# Patient Record
Sex: MALE | Race: WHITE | NOT HISPANIC OR LATINO | Employment: OTHER | URBAN - METROPOLITAN AREA
[De-identification: names, ages, dates, MRNs, and addresses within clinical notes are randomized per-mention and may not be internally consistent; named-entity substitution may affect disease eponyms.]

---

## 2017-04-08 ENCOUNTER — APPOINTMENT (INPATIENT)
Dept: RADIOLOGY | Facility: HOSPITAL | Age: 67
DRG: 208 | End: 2017-04-08
Payer: MEDICARE

## 2017-04-08 ENCOUNTER — APPOINTMENT (EMERGENCY)
Dept: CT IMAGING | Facility: HOSPITAL | Age: 67
DRG: 291 | End: 2017-04-08
Payer: MEDICARE

## 2017-04-08 ENCOUNTER — HOSPITAL ENCOUNTER (INPATIENT)
Facility: HOSPITAL | Age: 67
LOS: 1 days | DRG: 291 | End: 2017-04-08
Attending: EMERGENCY MEDICINE | Admitting: HOSPITALIST
Payer: MEDICARE

## 2017-04-08 ENCOUNTER — APPOINTMENT (EMERGENCY)
Dept: RADIOLOGY | Facility: HOSPITAL | Age: 67
DRG: 291 | End: 2017-04-08
Payer: MEDICARE

## 2017-04-08 ENCOUNTER — HOSPITAL ENCOUNTER (INPATIENT)
Facility: HOSPITAL | Age: 67
LOS: 4 days | Discharge: HOME HEALTH | DRG: 208 | End: 2017-04-12
Attending: EMERGENCY MEDICINE | Admitting: INTERNAL MEDICINE
Payer: MEDICARE

## 2017-04-08 ENCOUNTER — APPOINTMENT (INPATIENT)
Dept: NON INVASIVE DIAGNOSTICS | Facility: HOSPITAL | Age: 67
DRG: 291 | End: 2017-04-08
Payer: MEDICARE

## 2017-04-08 ENCOUNTER — APPOINTMENT (INPATIENT)
Dept: RADIOLOGY | Facility: HOSPITAL | Age: 67
DRG: 291 | End: 2017-04-08
Payer: MEDICARE

## 2017-04-08 VITALS
BODY MASS INDEX: 42.98 KG/M2 | HEART RATE: 101 BPM | DIASTOLIC BLOOD PRESSURE: 96 MMHG | RESPIRATION RATE: 20 BRPM | SYSTOLIC BLOOD PRESSURE: 179 MMHG | WEIGHT: 307 LBS | HEIGHT: 71 IN | OXYGEN SATURATION: 95 % | TEMPERATURE: 95.2 F

## 2017-04-08 DIAGNOSIS — I24.9 ACUTE CORONARY SYNDROME (HCC): ICD-10-CM

## 2017-04-08 DIAGNOSIS — G45.9 TRANSIENT CEREBRAL ISCHEMIA, UNSPECIFIED TYPE: ICD-10-CM

## 2017-04-08 DIAGNOSIS — J96.22 ACUTE ON CHRONIC RESPIRATORY FAILURE WITH HYPOXIA AND HYPERCAPNIA (HCC): ICD-10-CM

## 2017-04-08 DIAGNOSIS — I50.9 CHF (CONGESTIVE HEART FAILURE) (HCC): ICD-10-CM

## 2017-04-08 DIAGNOSIS — J96.21 ACUTE ON CHRONIC RESPIRATORY FAILURE WITH HYPOXIA AND HYPERCAPNIA (HCC): ICD-10-CM

## 2017-04-08 DIAGNOSIS — I50.9 CHF (CONGESTIVE HEART FAILURE) (HCC): Primary | ICD-10-CM

## 2017-04-08 DIAGNOSIS — J44.1 COPD WITH ACUTE EXACERBATION (HCC): ICD-10-CM

## 2017-04-08 DIAGNOSIS — R53.1 RIGHT SIDED WEAKNESS: Primary | ICD-10-CM

## 2017-04-08 PROBLEM — E11.9 TYPE 2 DIABETES MELLITUS (HCC): Status: ACTIVE | Noted: 2017-04-08

## 2017-04-08 PROBLEM — I16.1 HYPERTENSIVE EMERGENCY: Status: ACTIVE | Noted: 2017-04-08

## 2017-04-08 PROBLEM — I48.0 PAROXYSMAL ATRIAL FIBRILLATION (HCC): Status: ACTIVE | Noted: 2017-04-08

## 2017-04-08 LAB
ALBUMIN SERPL BCP-MCNC: 2.8 G/DL (ref 3.5–5)
ALP SERPL-CCNC: 102 U/L (ref 46–116)
ALT SERPL W P-5'-P-CCNC: 44 U/L (ref 12–78)
ANION GAP SERPL CALCULATED.3IONS-SCNC: 7 MMOL/L (ref 4–13)
APTT PPP: 31 SECONDS (ref 24–36)
ARTERIAL PATENCY WRIST A: ABNORMAL
ARTERIAL PATENCY WRIST A: YES
AST SERPL W P-5'-P-CCNC: 25 U/L (ref 5–45)
BACTERIA UR QL AUTO: ABNORMAL /HPF
BASE EXCESS BLDA CALC-SCNC: 4 MMOL/L (ref -2–3)
BASE EXCESS BLDA CALC-SCNC: 5.2 MMOL/L
BASOPHILS # BLD AUTO: 0.03 THOUSANDS/ΜL (ref 0–0.1)
BASOPHILS NFR BLD AUTO: 0 % (ref 0–1)
BILIRUB SERPL-MCNC: 0.6 MG/DL (ref 0.2–1)
BILIRUB UR QL STRIP: NEGATIVE
BUN SERPL-MCNC: 20 MG/DL (ref 5–25)
CALCIUM SERPL-MCNC: 8.8 MG/DL (ref 8.3–10.1)
CHLORIDE SERPL-SCNC: 106 MMOL/L (ref 100–108)
CLARITY UR: CLEAR
CO2 SERPL-SCNC: 30 MMOL/L (ref 21–32)
COLOR UR: YELLOW
CREAT SERPL-MCNC: 1.45 MG/DL (ref 0.6–1.3)
EOSINOPHIL # BLD AUTO: 0.14 THOUSAND/ΜL (ref 0–0.61)
EOSINOPHIL NFR BLD AUTO: 2 % (ref 0–6)
ERYTHROCYTE [DISTWIDTH] IN BLOOD BY AUTOMATED COUNT: 15.4 % (ref 11.6–15.1)
FIO2 GAS DIL.REBREATH: 28 L
GFR SERPL CREATININE-BSD FRML MDRD: 48.4 ML/MIN/1.73SQ M
GLUCOSE SERPL-MCNC: 112 MG/DL (ref 65–140)
GLUCOSE SERPL-MCNC: 131 MG/DL (ref 65–140)
GLUCOSE SERPL-MCNC: 154 MG/DL (ref 65–140)
GLUCOSE SERPL-MCNC: 158 MG/DL (ref 65–140)
GLUCOSE SERPL-MCNC: 175 MG/DL (ref 65–140)
GLUCOSE UR STRIP-MCNC: ABNORMAL MG/DL
HCO3 BLDA-SCNC: 31.6 MMOL/L (ref 22–28)
HCO3 BLDA-SCNC: 34.4 MMOL/L (ref 22–28)
HCT VFR BLD AUTO: 46.1 % (ref 36.5–49.3)
HGB BLD-MCNC: 14.2 G/DL (ref 12–17)
HGB UR QL STRIP.AUTO: ABNORMAL
HOROWITZ INDEX BLDA+IHG-RTO: 80 MM[HG]
HYALINE CASTS #/AREA URNS LPF: ABNORMAL /LPF
INR PPP: 1.08 (ref 0.86–1.16)
KETONES UR STRIP-MCNC: NEGATIVE MG/DL
LEUKOCYTE ESTERASE UR QL STRIP: NEGATIVE
LYMPHOCYTES # BLD AUTO: 1.4 THOUSANDS/ΜL (ref 0.6–4.47)
LYMPHOCYTES NFR BLD AUTO: 17 % (ref 14–44)
MCH RBC QN AUTO: 27.6 PG (ref 26.8–34.3)
MCHC RBC AUTO-ENTMCNC: 30.8 G/DL (ref 31.4–37.4)
MCV RBC AUTO: 90 FL (ref 82–98)
MONOCYTES # BLD AUTO: 0.66 THOUSAND/ΜL (ref 0.17–1.22)
MONOCYTES NFR BLD AUTO: 8 % (ref 4–12)
NEUTROPHILS # BLD AUTO: 5.99 THOUSANDS/ΜL (ref 1.85–7.62)
NEUTS SEG NFR BLD AUTO: 73 % (ref 43–75)
NITRITE UR QL STRIP: NEGATIVE
NON-SQ EPI CELLS URNS QL MICRO: ABNORMAL /HPF
NT-PROBNP SERPL-MCNC: 1620 PG/ML
O2 CT BLDA-SCNC: 18 ML/DL (ref 16–23)
OXYHGB MFR BLDA: 92.5 % (ref 94–97)
PCO2 BLD: 37 MMOL/L (ref 21–32)
PCO2 BLD: 80.9 MM HG (ref 36–44)
PCO2 BLDA: 53.5 MM HG (ref 36–44)
PEEP RESPIRATORY: 5 CM[H2O]
PH BLD: 7.24 [PH] (ref 7.35–7.45)
PH BLDA: 7.39 [PH] (ref 7.35–7.45)
PH UR STRIP.AUTO: 7 [PH] (ref 4.5–8)
PLATELET # BLD AUTO: 229 THOUSANDS/UL (ref 149–390)
PMV BLD AUTO: 9.6 FL (ref 8.9–12.7)
PO2 BLD: 89 MM HG (ref 75–129)
PO2 BLDA: 70.4 MM HG (ref 75–129)
POTASSIUM SERPL-SCNC: 4 MMOL/L (ref 3.5–5.3)
PROT SERPL-MCNC: 7.3 G/DL (ref 6.4–8.2)
PROT UR STRIP-MCNC: ABNORMAL MG/DL
PROTHROMBIN TIME: 13.9 SECONDS (ref 12–14.3)
RBC # BLD AUTO: 5.14 MILLION/UL (ref 3.88–5.62)
RBC #/AREA URNS AUTO: ABNORMAL /HPF
SAMPLE SITE: ABNORMAL
SAO2 % BLD FROM PO2: 94 % (ref 95–98)
SODIUM SERPL-SCNC: 143 MMOL/L (ref 136–145)
SP GR UR STRIP.AUTO: 1.02 (ref 1–1.03)
SPECIMEN SOURCE: ABNORMAL
SPECIMEN SOURCE: ABNORMAL
TROPONIN I SERPL-MCNC: 0.04 NG/ML
TROPONIN I SERPL-MCNC: 0.04 NG/ML
UROBILINOGEN UR QL STRIP.AUTO: 0.2 E.U./DL
VENT AC: 16
VENT- AC: AC
VT SETTING VENT: 500 ML
WBC # BLD AUTO: 8.22 THOUSAND/UL (ref 4.31–10.16)
WBC #/AREA URNS AUTO: ABNORMAL /HPF

## 2017-04-08 PROCEDURE — 80053 COMPREHEN METABOLIC PANEL: CPT | Performed by: EMERGENCY MEDICINE

## 2017-04-08 PROCEDURE — 93306 TTE W/DOPPLER COMPLETE: CPT

## 2017-04-08 PROCEDURE — 70547 MR ANGIOGRAPHY NECK W/O DYE: CPT

## 2017-04-08 PROCEDURE — 87086 URINE CULTURE/COLONY COUNT: CPT | Performed by: EMERGENCY MEDICINE

## 2017-04-08 PROCEDURE — 83880 ASSAY OF NATRIURETIC PEPTIDE: CPT | Performed by: EMERGENCY MEDICINE

## 2017-04-08 PROCEDURE — 71010 HB CHEST X-RAY 1 VIEW FRONTAL (PORTABLE): CPT

## 2017-04-08 PROCEDURE — 82805 BLOOD GASES W/O2 SATURATION: CPT | Performed by: EMERGENCY MEDICINE

## 2017-04-08 PROCEDURE — 5A09357 ASSISTANCE WITH RESPIRATORY VENTILATION, LESS THAN 24 CONSECUTIVE HOURS, CONTINUOUS POSITIVE AIRWAY PRESSURE: ICD-10-PCS | Performed by: HOSPITALIST

## 2017-04-08 PROCEDURE — 84484 ASSAY OF TROPONIN QUANT: CPT | Performed by: EMERGENCY MEDICINE

## 2017-04-08 PROCEDURE — 85610 PROTHROMBIN TIME: CPT | Performed by: EMERGENCY MEDICINE

## 2017-04-08 PROCEDURE — 70551 MRI BRAIN STEM W/O DYE: CPT

## 2017-04-08 PROCEDURE — 36600 WITHDRAWAL OF ARTERIAL BLOOD: CPT

## 2017-04-08 PROCEDURE — 71020 HB CHEST X-RAY 2VW FRONTAL&LATL: CPT

## 2017-04-08 PROCEDURE — 96375 TX/PRO/DX INJ NEW DRUG ADDON: CPT

## 2017-04-08 PROCEDURE — 82803 BLOOD GASES ANY COMBINATION: CPT

## 2017-04-08 PROCEDURE — 94002 VENT MGMT INPAT INIT DAY: CPT

## 2017-04-08 PROCEDURE — 93005 ELECTROCARDIOGRAM TRACING: CPT | Performed by: HOSPITALIST

## 2017-04-08 PROCEDURE — 96374 THER/PROPH/DIAG INJ IV PUSH: CPT

## 2017-04-08 PROCEDURE — 70544 MR ANGIOGRAPHY HEAD W/O DYE: CPT

## 2017-04-08 PROCEDURE — 81001 URINALYSIS AUTO W/SCOPE: CPT | Performed by: EMERGENCY MEDICINE

## 2017-04-08 PROCEDURE — 85730 THROMBOPLASTIN TIME PARTIAL: CPT | Performed by: EMERGENCY MEDICINE

## 2017-04-08 PROCEDURE — 82948 REAGENT STRIP/BLOOD GLUCOSE: CPT

## 2017-04-08 PROCEDURE — 5A1935Z RESPIRATORY VENTILATION, LESS THAN 24 CONSECUTIVE HOURS: ICD-10-PCS | Performed by: INTERNAL MEDICINE

## 2017-04-08 PROCEDURE — 99285 EMERGENCY DEPT VISIT HI MDM: CPT

## 2017-04-08 PROCEDURE — 36415 COLL VENOUS BLD VENIPUNCTURE: CPT | Performed by: EMERGENCY MEDICINE

## 2017-04-08 PROCEDURE — 70450 CT HEAD/BRAIN W/O DYE: CPT

## 2017-04-08 PROCEDURE — 85025 COMPLETE CBC W/AUTO DIFF WBC: CPT | Performed by: EMERGENCY MEDICINE

## 2017-04-08 PROCEDURE — 94770 HB EXHALED CARBON DIOXIDE TEST: CPT

## 2017-04-08 PROCEDURE — 94760 N-INVAS EAR/PLS OXIMETRY 1: CPT

## 2017-04-08 RX ORDER — POTASSIUM CHLORIDE 20 MEQ/1
20 TABLET, EXTENDED RELEASE ORAL DAILY
Status: CANCELLED | OUTPATIENT
Start: 2017-04-09

## 2017-04-08 RX ORDER — FOLIC ACID 1 MG/1
1 TABLET ORAL DAILY
COMMUNITY

## 2017-04-08 RX ORDER — ASPIRIN 300 MG/1
300 SUPPOSITORY RECTAL DAILY
Status: CANCELLED | OUTPATIENT
Start: 2017-04-09

## 2017-04-08 RX ORDER — ASPIRIN 325 MG
325 TABLET ORAL DAILY
Status: DISCONTINUED | OUTPATIENT
Start: 2017-04-08 | End: 2017-04-08

## 2017-04-08 RX ORDER — NIACIN 500 MG/1
500 TABLET, EXTENDED RELEASE ORAL
COMMUNITY
End: 2021-10-31 | Stop reason: CLARIF

## 2017-04-08 RX ORDER — IPRATROPIUM BROMIDE AND ALBUTEROL SULFATE 2.5; .5 MG/3ML; MG/3ML
3 SOLUTION RESPIRATORY (INHALATION)
Status: CANCELLED | OUTPATIENT
Start: 2017-04-08

## 2017-04-08 RX ORDER — SIMVASTATIN 10 MG
10 TABLET ORAL
COMMUNITY

## 2017-04-08 RX ORDER — FUROSEMIDE 10 MG/ML
40 INJECTION INTRAMUSCULAR; INTRAVENOUS DAILY
Status: DISCONTINUED | OUTPATIENT
Start: 2017-04-09 | End: 2017-04-10

## 2017-04-08 RX ORDER — FOLIC ACID 1 MG/1
1 TABLET ORAL DAILY
Status: DISCONTINUED | OUTPATIENT
Start: 2017-04-08 | End: 2017-04-08 | Stop reason: HOSPADM

## 2017-04-08 RX ORDER — ONDANSETRON 2 MG/ML
4 INJECTION INTRAMUSCULAR; INTRAVENOUS EVERY 6 HOURS PRN
Status: DISCONTINUED | OUTPATIENT
Start: 2017-04-08 | End: 2017-04-12 | Stop reason: HOSPADM

## 2017-04-08 RX ORDER — LOSARTAN POTASSIUM 50 MG/1
75 TABLET ORAL DAILY
Status: DISCONTINUED | OUTPATIENT
Start: 2017-04-09 | End: 2017-04-10

## 2017-04-08 RX ORDER — TAMSULOSIN HYDROCHLORIDE 0.4 MG/1
0.8 CAPSULE ORAL
Status: DISCONTINUED | OUTPATIENT
Start: 2017-04-08 | End: 2017-04-08 | Stop reason: HOSPADM

## 2017-04-08 RX ORDER — HYDRALAZINE HYDROCHLORIDE 20 MG/ML
10 INJECTION INTRAMUSCULAR; INTRAVENOUS EVERY 6 HOURS PRN
Status: DISCONTINUED | OUTPATIENT
Start: 2017-04-08 | End: 2017-04-08

## 2017-04-08 RX ORDER — AMLODIPINE BESYLATE 10 MG/1
5 TABLET ORAL DAILY
COMMUNITY

## 2017-04-08 RX ORDER — FUROSEMIDE 10 MG/ML
40 INJECTION INTRAMUSCULAR; INTRAVENOUS DAILY
Status: DISCONTINUED | OUTPATIENT
Start: 2017-04-09 | End: 2017-04-08 | Stop reason: HOSPADM

## 2017-04-08 RX ORDER — NIACIN 500 MG/1
500 TABLET, EXTENDED RELEASE ORAL
Status: CANCELLED | OUTPATIENT
Start: 2017-04-08

## 2017-04-08 RX ORDER — ALLOPURINOL 100 MG/1
100 TABLET ORAL DAILY
Status: DISCONTINUED | OUTPATIENT
Start: 2017-04-08 | End: 2017-04-08 | Stop reason: HOSPADM

## 2017-04-08 RX ORDER — AMLODIPINE BESYLATE 10 MG/1
10 TABLET ORAL DAILY
Status: DISCONTINUED | OUTPATIENT
Start: 2017-04-09 | End: 2017-04-12 | Stop reason: HOSPADM

## 2017-04-08 RX ORDER — ATORVASTATIN CALCIUM 40 MG/1
80 TABLET, FILM COATED ORAL
Status: CANCELLED | OUTPATIENT
Start: 2017-04-09

## 2017-04-08 RX ORDER — FUROSEMIDE 10 MG/ML
40 INJECTION INTRAMUSCULAR; INTRAVENOUS DAILY
Status: CANCELLED | OUTPATIENT
Start: 2017-04-09

## 2017-04-08 RX ORDER — POTASSIUM CHLORIDE 20 MEQ/1
20 TABLET, EXTENDED RELEASE ORAL DAILY
COMMUNITY

## 2017-04-08 RX ORDER — DABIGATRAN ETEXILATE 75 MG/1
150 CAPSULE, COATED PELLETS ORAL 2 TIMES DAILY
Status: CANCELLED | OUTPATIENT
Start: 2017-04-09

## 2017-04-08 RX ORDER — ACETAMINOPHEN 325 MG/1
650 TABLET ORAL EVERY 6 HOURS PRN
Status: DISCONTINUED | OUTPATIENT
Start: 2017-04-08 | End: 2017-04-12 | Stop reason: HOSPADM

## 2017-04-08 RX ORDER — HYDROCHLOROTHIAZIDE 50 MG/1
50 TABLET ORAL DAILY
Status: DISCONTINUED | OUTPATIENT
Start: 2017-04-09 | End: 2017-04-10

## 2017-04-08 RX ORDER — ALLOPURINOL 100 MG/1
100 TABLET ORAL DAILY
COMMUNITY

## 2017-04-08 RX ORDER — TAMSULOSIN HYDROCHLORIDE 0.4 MG/1
0.8 CAPSULE ORAL
Status: CANCELLED | OUTPATIENT
Start: 2017-04-09

## 2017-04-08 RX ORDER — LOSARTAN POTASSIUM 50 MG/1
100 TABLET ORAL DAILY
COMMUNITY

## 2017-04-08 RX ORDER — NIACIN 500 MG/1
500 TABLET, EXTENDED RELEASE ORAL
Status: DISCONTINUED | OUTPATIENT
Start: 2017-04-08 | End: 2017-04-08 | Stop reason: HOSPADM

## 2017-04-08 RX ORDER — ATORVASTATIN CALCIUM 40 MG/1
80 TABLET, FILM COATED ORAL
Status: DISCONTINUED | OUTPATIENT
Start: 2017-04-08 | End: 2017-04-08

## 2017-04-08 RX ORDER — ONDANSETRON 2 MG/ML
4 INJECTION INTRAMUSCULAR; INTRAVENOUS ONCE
Status: COMPLETED | OUTPATIENT
Start: 2017-04-08 | End: 2017-04-08

## 2017-04-08 RX ORDER — POTASSIUM CHLORIDE 20 MEQ/1
20 TABLET, EXTENDED RELEASE ORAL DAILY
Status: DISCONTINUED | OUTPATIENT
Start: 2017-04-08 | End: 2017-04-08 | Stop reason: HOSPADM

## 2017-04-08 RX ORDER — DABIGATRAN ETEXILATE 150 MG/1
150 CAPSULE, COATED PELLETS ORAL 2 TIMES DAILY
Status: DISCONTINUED | OUTPATIENT
Start: 2017-04-09 | End: 2017-04-12 | Stop reason: HOSPADM

## 2017-04-08 RX ORDER — ASPIRIN 300 MG/1
300 SUPPOSITORY RECTAL DAILY
Status: DISCONTINUED | OUTPATIENT
Start: 2017-04-09 | End: 2017-04-08 | Stop reason: HOSPADM

## 2017-04-08 RX ORDER — ALLOPURINOL 100 MG/1
100 TABLET ORAL DAILY
Status: CANCELLED | OUTPATIENT
Start: 2017-04-09

## 2017-04-08 RX ORDER — PANTOPRAZOLE SODIUM 40 MG/1
40 INJECTION, POWDER, FOR SOLUTION INTRAVENOUS
Status: DISCONTINUED | OUTPATIENT
Start: 2017-04-09 | End: 2017-04-12 | Stop reason: HOSPADM

## 2017-04-08 RX ORDER — POTASSIUM CHLORIDE 20 MEQ/1
20 TABLET, EXTENDED RELEASE ORAL DAILY
Status: DISCONTINUED | OUTPATIENT
Start: 2017-04-09 | End: 2017-04-12 | Stop reason: HOSPADM

## 2017-04-08 RX ORDER — ATORVASTATIN CALCIUM 80 MG/1
80 TABLET, FILM COATED ORAL
Status: DISCONTINUED | OUTPATIENT
Start: 2017-04-09 | End: 2017-04-12 | Stop reason: HOSPADM

## 2017-04-08 RX ORDER — METHYLPREDNISOLONE SODIUM SUCCINATE 40 MG/ML
40 INJECTION, POWDER, LYOPHILIZED, FOR SOLUTION INTRAMUSCULAR; INTRAVENOUS EVERY 12 HOURS SCHEDULED
Status: CANCELLED | OUTPATIENT
Start: 2017-04-08

## 2017-04-08 RX ORDER — ONDANSETRON 2 MG/ML
4 INJECTION INTRAMUSCULAR; INTRAVENOUS EVERY 6 HOURS PRN
Status: CANCELLED | OUTPATIENT
Start: 2017-04-08

## 2017-04-08 RX ORDER — ASPIRIN 300 MG/1
300 SUPPOSITORY RECTAL DAILY
Status: DISCONTINUED | OUTPATIENT
Start: 2017-04-09 | End: 2017-04-09

## 2017-04-08 RX ORDER — IPRATROPIUM BROMIDE AND ALBUTEROL SULFATE 2.5; .5 MG/3ML; MG/3ML
3 SOLUTION RESPIRATORY (INHALATION)
Status: DISCONTINUED | OUTPATIENT
Start: 2017-04-08 | End: 2017-04-08 | Stop reason: HOSPADM

## 2017-04-08 RX ORDER — HYDRALAZINE HYDROCHLORIDE 20 MG/ML
5 INJECTION INTRAMUSCULAR; INTRAVENOUS EVERY 6 HOURS PRN
Status: DISCONTINUED | OUTPATIENT
Start: 2017-04-08 | End: 2017-04-08

## 2017-04-08 RX ORDER — PROPOFOL 10 MG/ML
5-50 INJECTION, EMULSION INTRAVENOUS
Status: DISCONTINUED | OUTPATIENT
Start: 2017-04-08 | End: 2017-04-08 | Stop reason: HOSPADM

## 2017-04-08 RX ORDER — IPRATROPIUM BROMIDE AND ALBUTEROL SULFATE 2.5; .5 MG/3ML; MG/3ML
3 SOLUTION RESPIRATORY (INHALATION)
Status: DISCONTINUED | OUTPATIENT
Start: 2017-04-08 | End: 2017-04-08

## 2017-04-08 RX ORDER — DABIGATRAN ETEXILATE 150 MG/1
150 CAPSULE, COATED PELLETS ORAL 2 TIMES DAILY
Status: ON HOLD | COMMUNITY
End: 2017-04-12

## 2017-04-08 RX ORDER — ONDANSETRON 2 MG/ML
4 INJECTION INTRAMUSCULAR; INTRAVENOUS EVERY 6 HOURS PRN
Status: DISCONTINUED | OUTPATIENT
Start: 2017-04-08 | End: 2017-04-08 | Stop reason: HOSPADM

## 2017-04-08 RX ORDER — BUDESONIDE AND FORMOTEROL FUMARATE DIHYDRATE 160; 4.5 UG/1; UG/1
2 AEROSOL RESPIRATORY (INHALATION) 2 TIMES DAILY
Status: DISCONTINUED | OUTPATIENT
Start: 2017-04-09 | End: 2017-04-12 | Stop reason: HOSPADM

## 2017-04-08 RX ORDER — TAMSULOSIN HYDROCHLORIDE 0.4 MG/1
0.8 CAPSULE ORAL
Status: DISCONTINUED | OUTPATIENT
Start: 2017-04-09 | End: 2017-04-12 | Stop reason: HOSPADM

## 2017-04-08 RX ORDER — GLIPIZIDE 10 MG/1
15 TABLET ORAL
COMMUNITY
End: 2022-06-06 | Stop reason: ALTCHOICE

## 2017-04-08 RX ORDER — FOLIC ACID 1 MG/1
1 TABLET ORAL DAILY
Status: DISCONTINUED | OUTPATIENT
Start: 2017-04-09 | End: 2017-04-12 | Stop reason: HOSPADM

## 2017-04-08 RX ORDER — HYDROCHLOROTHIAZIDE 50 MG/1
50 TABLET ORAL DAILY
COMMUNITY
End: 2017-04-12 | Stop reason: HOSPADM

## 2017-04-08 RX ORDER — METHYLPREDNISOLONE SODIUM SUCCINATE 40 MG/ML
40 INJECTION, POWDER, LYOPHILIZED, FOR SOLUTION INTRAMUSCULAR; INTRAVENOUS EVERY 12 HOURS SCHEDULED
Status: DISCONTINUED | OUTPATIENT
Start: 2017-04-08 | End: 2017-04-08 | Stop reason: HOSPADM

## 2017-04-08 RX ORDER — PROPOFOL 10 MG/ML
5-50 INJECTION, EMULSION INTRAVENOUS
Status: DISCONTINUED | OUTPATIENT
Start: 2017-04-08 | End: 2017-04-10

## 2017-04-08 RX ORDER — BUDESONIDE AND FORMOTEROL FUMARATE DIHYDRATE 160; 4.5 UG/1; UG/1
2 AEROSOL RESPIRATORY (INHALATION) 2 TIMES DAILY
COMMUNITY
End: 2022-06-06 | Stop reason: ALTCHOICE

## 2017-04-08 RX ORDER — FUROSEMIDE 10 MG/ML
40 INJECTION INTRAMUSCULAR; INTRAVENOUS ONCE
Status: COMPLETED | OUTPATIENT
Start: 2017-04-08 | End: 2017-04-08

## 2017-04-08 RX ORDER — PROPOFOL 10 MG/ML
INJECTION, EMULSION INTRAVENOUS
Status: COMPLETED
Start: 2017-04-08 | End: 2017-04-08

## 2017-04-08 RX ORDER — BUDESONIDE AND FORMOTEROL FUMARATE DIHYDRATE 160; 4.5 UG/1; UG/1
2 AEROSOL RESPIRATORY (INHALATION) 2 TIMES DAILY
Status: DISCONTINUED | OUTPATIENT
Start: 2017-04-08 | End: 2017-04-08 | Stop reason: HOSPADM

## 2017-04-08 RX ORDER — BUDESONIDE AND FORMOTEROL FUMARATE DIHYDRATE 160; 4.5 UG/1; UG/1
2 AEROSOL RESPIRATORY (INHALATION) 2 TIMES DAILY
Status: CANCELLED | OUTPATIENT
Start: 2017-04-09

## 2017-04-08 RX ORDER — FUROSEMIDE 10 MG/ML
40 INJECTION INTRAMUSCULAR; INTRAVENOUS DAILY
Status: DISCONTINUED | OUTPATIENT
Start: 2017-04-08 | End: 2017-04-08

## 2017-04-08 RX ORDER — TAMSULOSIN HYDROCHLORIDE 0.4 MG/1
0.8 CAPSULE ORAL
COMMUNITY

## 2017-04-08 RX ORDER — DABIGATRAN ETEXILATE 75 MG/1
150 CAPSULE, COATED PELLETS ORAL 2 TIMES DAILY
Status: DISCONTINUED | OUTPATIENT
Start: 2017-04-08 | End: 2017-04-08 | Stop reason: HOSPADM

## 2017-04-08 RX ORDER — ATORVASTATIN CALCIUM 40 MG/1
80 TABLET, FILM COATED ORAL
Status: DISCONTINUED | OUTPATIENT
Start: 2017-04-08 | End: 2017-04-08 | Stop reason: HOSPADM

## 2017-04-08 RX ORDER — NIACIN 500 MG/1
500 TABLET, EXTENDED RELEASE ORAL
Status: DISCONTINUED | OUTPATIENT
Start: 2017-04-08 | End: 2017-04-12 | Stop reason: HOSPADM

## 2017-04-08 RX ORDER — LEVALBUTEROL 1.25 MG/.5ML
1.25 SOLUTION, CONCENTRATE RESPIRATORY (INHALATION)
Status: DISCONTINUED | OUTPATIENT
Start: 2017-04-09 | End: 2017-04-10

## 2017-04-08 RX ORDER — ALLOPURINOL 100 MG/1
100 TABLET ORAL DAILY
Status: DISCONTINUED | OUTPATIENT
Start: 2017-04-09 | End: 2017-04-12 | Stop reason: HOSPADM

## 2017-04-08 RX ORDER — LABETALOL HYDROCHLORIDE 5 MG/ML
20 INJECTION, SOLUTION INTRAVENOUS EVERY 4 HOURS PRN
Status: DISCONTINUED | OUTPATIENT
Start: 2017-04-08 | End: 2017-04-12 | Stop reason: HOSPADM

## 2017-04-08 RX ORDER — ONDANSETRON 2 MG/ML
INJECTION INTRAMUSCULAR; INTRAVENOUS
Status: COMPLETED
Start: 2017-04-08 | End: 2017-04-08

## 2017-04-08 RX ORDER — METHYLPREDNISOLONE SODIUM SUCCINATE 40 MG/ML
40 INJECTION, POWDER, LYOPHILIZED, FOR SOLUTION INTRAMUSCULAR; INTRAVENOUS EVERY 12 HOURS SCHEDULED
Status: DISCONTINUED | OUTPATIENT
Start: 2017-04-08 | End: 2017-04-12 | Stop reason: HOSPADM

## 2017-04-08 RX ORDER — DIPHENOXYLATE HYDROCHLORIDE AND ATROPINE SULFATE 2.5; .025 MG/1; MG/1
1 TABLET ORAL DAILY
COMMUNITY

## 2017-04-08 RX ORDER — FOLIC ACID 1 MG/1
1 TABLET ORAL DAILY
Status: CANCELLED | OUTPATIENT
Start: 2017-04-09

## 2017-04-08 RX ORDER — FENTANYL CITRATE 50 UG/ML
25 INJECTION, SOLUTION INTRAMUSCULAR; INTRAVENOUS EVERY 2 HOUR PRN
Status: DISCONTINUED | OUTPATIENT
Start: 2017-04-08 | End: 2017-04-10

## 2017-04-08 RX ADMIN — ALLOPURINOL 100 MG: 100 TABLET ORAL at 16:39

## 2017-04-08 RX ADMIN — TAMSULOSIN HYDROCHLORIDE 0.8 MG: 0.4 CAPSULE ORAL at 16:40

## 2017-04-08 RX ADMIN — CEFTRIAXONE SODIUM 1000 MG: 1 INJECTION, POWDER, FOR SOLUTION INTRAMUSCULAR; INTRAVENOUS at 16:39

## 2017-04-08 RX ADMIN — DABIGATRAN ETEXILATE MESYLATE 150 MG: 75 CAPSULE ORAL at 18:57

## 2017-04-08 RX ADMIN — LABETALOL HYDROCHLORIDE 1 MG/MIN: 5 INJECTION, SOLUTION INTRAVENOUS at 17:31

## 2017-04-08 RX ADMIN — ASPIRIN 325 MG ORAL TABLET 325 MG: 325 PILL ORAL at 16:40

## 2017-04-08 RX ADMIN — ONDANSETRON 4 MG: 2 INJECTION INTRAMUSCULAR; INTRAVENOUS at 14:13

## 2017-04-08 RX ADMIN — ONDANSETRON 4 MG: 2 INJECTION INTRAMUSCULAR; INTRAVENOUS at 09:49

## 2017-04-08 RX ADMIN — METOPROLOL TARTRATE 5 MG: 5 INJECTION INTRAVENOUS at 10:41

## 2017-04-08 RX ADMIN — ATORVASTATIN CALCIUM 80 MG: 40 TABLET, FILM COATED ORAL at 16:40

## 2017-04-08 RX ADMIN — PROPOFOL 20.14 MCG/KG/MIN: 10 INJECTION, EMULSION INTRAVENOUS at 22:42

## 2017-04-08 RX ADMIN — PROPOFOL 34.6 MCG/KG/MIN: 10 INJECTION, EMULSION INTRAVENOUS at 18:58

## 2017-04-08 RX ADMIN — METHYLPREDNISOLONE SODIUM SUCCINATE 40 MG: 40 INJECTION, POWDER, FOR SOLUTION INTRAMUSCULAR; INTRAVENOUS at 22:44

## 2017-04-08 RX ADMIN — FUROSEMIDE 40 MG: 10 INJECTION, SOLUTION INTRAMUSCULAR; INTRAVENOUS at 10:41

## 2017-04-08 RX ADMIN — FOLIC ACID 1 MG: 1 TABLET ORAL at 16:39

## 2017-04-09 ENCOUNTER — APPOINTMENT (INPATIENT)
Dept: RADIOLOGY | Facility: HOSPITAL | Age: 67
DRG: 208 | End: 2017-04-09
Payer: MEDICARE

## 2017-04-09 PROBLEM — I21.4 NSTEMI (NON-ST ELEVATED MYOCARDIAL INFARCTION) (HCC): Status: ACTIVE | Noted: 2017-04-09

## 2017-04-09 LAB
ANION GAP SERPL CALCULATED.3IONS-SCNC: 11 MMOL/L (ref 4–13)
ANISOCYTOSIS BLD QL SMEAR: PRESENT
ARTERIAL PATENCY WRIST A: YES
BACTERIA UR CULT: NORMAL
BASE EXCESS BLDA CALC-SCNC: 5.5 MMOL/L
BASOPHILS # BLD MANUAL: 0 THOUSAND/UL (ref 0–0.1)
BASOPHILS NFR MAR MANUAL: 0 % (ref 0–1)
BUN SERPL-MCNC: 27 MG/DL (ref 5–25)
CALCIUM SERPL-MCNC: 8.5 MG/DL (ref 8.3–10.1)
CHLORIDE SERPL-SCNC: 104 MMOL/L (ref 100–108)
CO2 SERPL-SCNC: 24 MMOL/L (ref 21–32)
CREAT SERPL-MCNC: 1.75 MG/DL (ref 0.6–1.3)
EOSINOPHIL # BLD MANUAL: 0 THOUSAND/UL (ref 0–0.4)
EOSINOPHIL NFR BLD MANUAL: 0 % (ref 0–6)
ERYTHROCYTE [DISTWIDTH] IN BLOOD BY AUTOMATED COUNT: 15.1 % (ref 11.6–15.1)
GFR SERPL CREATININE-BSD FRML MDRD: 39 ML/MIN/1.73SQ M
GLUCOSE SERPL-MCNC: 159 MG/DL (ref 65–140)
GLUCOSE SERPL-MCNC: 162 MG/DL (ref 65–140)
GLUCOSE SERPL-MCNC: 163 MG/DL (ref 65–140)
GLUCOSE SERPL-MCNC: 169 MG/DL (ref 65–140)
GLUCOSE SERPL-MCNC: 176 MG/DL (ref 65–140)
HCO3 BLDA-SCNC: 31.3 MMOL/L (ref 22–28)
HCT VFR BLD AUTO: 42.9 % (ref 36.5–49.3)
HGB BLD-MCNC: 13.2 G/DL (ref 12–17)
HOROWITZ INDEX BLDA+IHG-RTO: 80 MM[HG]
LYMPHOCYTES # BLD AUTO: 0.46 THOUSAND/UL (ref 0.6–4.47)
LYMPHOCYTES # BLD AUTO: 4 % (ref 14–44)
MAGNESIUM SERPL-MCNC: 2.1 MG/DL (ref 1.6–2.6)
MCH RBC QN AUTO: 27.8 PG (ref 26.8–34.3)
MCHC RBC AUTO-ENTMCNC: 30.8 G/DL (ref 31.4–37.4)
MCV RBC AUTO: 90 FL (ref 82–98)
MONOCYTES # BLD AUTO: 0 THOUSAND/UL (ref 0–1.22)
MONOCYTES NFR BLD: 0 % (ref 4–12)
NEUTROPHILS # BLD MANUAL: 10.99 THOUSAND/UL (ref 1.85–7.62)
NEUTS SEG NFR BLD AUTO: 96 % (ref 43–75)
NRBC BLD AUTO-RTO: 0 /100 WBCS
O2 CT BLDA-SCNC: 17.7 ML/DL (ref 16–23)
OXYHGB MFR BLDA: 90.5 % (ref 94–97)
PCO2 BLDA: 50.2 MM HG (ref 36–44)
PEEP RESPIRATORY: 5 CM[H2O]
PH BLDA: 7.41 [PH] (ref 7.35–7.45)
PHOSPHATE SERPL-MCNC: 3.7 MG/DL (ref 2.3–4.1)
PLATELET # BLD AUTO: 246 THOUSANDS/UL (ref 149–390)
PLATELET BLD QL SMEAR: ADEQUATE
PMV BLD AUTO: 9.8 FL (ref 8.9–12.7)
PO2 BLDA: 62.1 MM HG (ref 75–129)
POIKILOCYTOSIS BLD QL SMEAR: PRESENT
POLYCHROMASIA BLD QL SMEAR: PRESENT
POTASSIUM SERPL-SCNC: 4.4 MMOL/L (ref 3.5–5.3)
RBC # BLD AUTO: 4.75 MILLION/UL (ref 3.88–5.62)
RBC MORPH BLD: PRESENT
SODIUM SERPL-SCNC: 139 MMOL/L (ref 136–145)
SPECIMEN SOURCE: ABNORMAL
TROPONIN I SERPL-MCNC: 0.03 NG/ML
VENT AC: 16
VENT- AC: AC
VT SETTING VENT: 500 ML
WBC # BLD AUTO: 11.45 THOUSAND/UL (ref 4.31–10.16)

## 2017-04-09 PROCEDURE — 85027 COMPLETE CBC AUTOMATED: CPT | Performed by: EMERGENCY MEDICINE

## 2017-04-09 PROCEDURE — 84100 ASSAY OF PHOSPHORUS: CPT | Performed by: EMERGENCY MEDICINE

## 2017-04-09 PROCEDURE — 82805 BLOOD GASES W/O2 SATURATION: CPT | Performed by: EMERGENCY MEDICINE

## 2017-04-09 PROCEDURE — 94660 CPAP INITIATION&MGMT: CPT

## 2017-04-09 PROCEDURE — 84484 ASSAY OF TROPONIN QUANT: CPT | Performed by: EMERGENCY MEDICINE

## 2017-04-09 PROCEDURE — 94640 AIRWAY INHALATION TREATMENT: CPT

## 2017-04-09 PROCEDURE — 94003 VENT MGMT INPAT SUBQ DAY: CPT

## 2017-04-09 PROCEDURE — C9113 INJ PANTOPRAZOLE SODIUM, VIA: HCPCS | Performed by: EMERGENCY MEDICINE

## 2017-04-09 PROCEDURE — 83735 ASSAY OF MAGNESIUM: CPT | Performed by: EMERGENCY MEDICINE

## 2017-04-09 PROCEDURE — 93005 ELECTROCARDIOGRAM TRACING: CPT | Performed by: EMERGENCY MEDICINE

## 2017-04-09 PROCEDURE — 80048 BASIC METABOLIC PNL TOTAL CA: CPT | Performed by: EMERGENCY MEDICINE

## 2017-04-09 PROCEDURE — 94760 N-INVAS EAR/PLS OXIMETRY 1: CPT

## 2017-04-09 PROCEDURE — 71010 HB CHEST X-RAY 1 VIEW FRONTAL (PORTABLE): CPT

## 2017-04-09 PROCEDURE — 36600 WITHDRAWAL OF ARTERIAL BLOOD: CPT

## 2017-04-09 PROCEDURE — 82948 REAGENT STRIP/BLOOD GLUCOSE: CPT

## 2017-04-09 PROCEDURE — 85007 BL SMEAR W/DIFF WBC COUNT: CPT | Performed by: EMERGENCY MEDICINE

## 2017-04-09 RX ORDER — ASPIRIN 81 MG/1
81 TABLET, CHEWABLE ORAL DAILY
Status: DISCONTINUED | OUTPATIENT
Start: 2017-04-09 | End: 2017-04-12 | Stop reason: HOSPADM

## 2017-04-09 RX ADMIN — AMLODIPINE BESYLATE 10 MG: 10 TABLET ORAL at 09:00

## 2017-04-09 RX ADMIN — HYDROCHLOROTHIAZIDE 50 MG: 50 TABLET ORAL at 09:01

## 2017-04-09 RX ADMIN — TAMSULOSIN HYDROCHLORIDE 0.8 MG: 0.4 CAPSULE ORAL at 18:30

## 2017-04-09 RX ADMIN — LEVALBUTEROL HYDROCHLORIDE 1.25 MG: 1.25 SOLUTION, CONCENTRATE RESPIRATORY (INHALATION) at 19:51

## 2017-04-09 RX ADMIN — LEVALBUTEROL HYDROCHLORIDE 1.25 MG: 1.25 SOLUTION, CONCENTRATE RESPIRATORY (INHALATION) at 07:40

## 2017-04-09 RX ADMIN — FUROSEMIDE 40 MG: 10 INJECTION, SOLUTION INTRAMUSCULAR; INTRAVENOUS at 09:00

## 2017-04-09 RX ADMIN — LEVALBUTEROL HYDROCHLORIDE 1.25 MG: 1.25 SOLUTION, CONCENTRATE RESPIRATORY (INHALATION) at 01:25

## 2017-04-09 RX ADMIN — METHYLPREDNISOLONE SODIUM SUCCINATE 40 MG: 40 INJECTION, POWDER, FOR SOLUTION INTRAMUSCULAR; INTRAVENOUS at 21:14

## 2017-04-09 RX ADMIN — DABIGATRAN ETEXILATE MESYLATE 150 MG: 150 CAPSULE ORAL at 18:29

## 2017-04-09 RX ADMIN — FOLIC ACID 1 MG: 1 TABLET ORAL at 09:00

## 2017-04-09 RX ADMIN — INSULIN LISPRO 1 UNITS: 100 INJECTION, SOLUTION INTRAVENOUS; SUBCUTANEOUS at 05:48

## 2017-04-09 RX ADMIN — METHYLPREDNISOLONE SODIUM SUCCINATE 40 MG: 40 INJECTION, POWDER, FOR SOLUTION INTRAMUSCULAR; INTRAVENOUS at 09:01

## 2017-04-09 RX ADMIN — INSULIN LISPRO 1 UNITS: 100 INJECTION, SOLUTION INTRAVENOUS; SUBCUTANEOUS at 12:21

## 2017-04-09 RX ADMIN — POTASSIUM CHLORIDE 20 MEQ: 1500 TABLET, EXTENDED RELEASE ORAL at 09:02

## 2017-04-09 RX ADMIN — INSULIN LISPRO 1 UNITS: 100 INJECTION, SOLUTION INTRAVENOUS; SUBCUTANEOUS at 18:30

## 2017-04-09 RX ADMIN — IPRATROPIUM BROMIDE 0.5 MG: 0.5 SOLUTION RESPIRATORY (INHALATION) at 07:40

## 2017-04-09 RX ADMIN — PROPOFOL 20 MCG/KG/MIN: 10 INJECTION, EMULSION INTRAVENOUS at 02:54

## 2017-04-09 RX ADMIN — IPRATROPIUM BROMIDE 0.5 MG: 0.5 SOLUTION RESPIRATORY (INHALATION) at 19:51

## 2017-04-09 RX ADMIN — ALLOPURINOL 100 MG: 100 TABLET ORAL at 08:58

## 2017-04-09 RX ADMIN — PANTOPRAZOLE SODIUM 40 MG: 40 INJECTION, POWDER, FOR SOLUTION INTRAVENOUS at 09:00

## 2017-04-09 RX ADMIN — BUDESONIDE AND FORMOTEROL FUMARATE DIHYDRATE 2 PUFF: 160; 4.5 AEROSOL RESPIRATORY (INHALATION) at 18:30

## 2017-04-09 RX ADMIN — ASPIRIN 300 MG: 300 SUPPOSITORY RECTAL at 10:19

## 2017-04-09 RX ADMIN — ATORVASTATIN CALCIUM 80 MG: 40 TABLET, FILM COATED ORAL at 18:29

## 2017-04-09 RX ADMIN — INSULIN LISPRO 1 UNITS: 100 INJECTION, SOLUTION INTRAVENOUS; SUBCUTANEOUS at 23:56

## 2017-04-09 RX ADMIN — LEVALBUTEROL HYDROCHLORIDE 1.25 MG: 1.25 SOLUTION, CONCENTRATE RESPIRATORY (INHALATION) at 13:57

## 2017-04-09 RX ADMIN — IPRATROPIUM BROMIDE 0.5 MG: 0.5 SOLUTION RESPIRATORY (INHALATION) at 13:57

## 2017-04-09 RX ADMIN — IPRATROPIUM BROMIDE 0.5 MG: 0.5 SOLUTION RESPIRATORY (INHALATION) at 01:25

## 2017-04-09 RX ADMIN — DABIGATRAN ETEXILATE MESYLATE 150 MG: 150 CAPSULE ORAL at 09:00

## 2017-04-09 RX ADMIN — LOSARTAN POTASSIUM 75 MG: 50 TABLET, FILM COATED ORAL at 09:01

## 2017-04-09 RX ADMIN — NIACIN 500 MG: 500 TABLET, FILM COATED, EXTENDED RELEASE ORAL at 21:14

## 2017-04-10 ENCOUNTER — APPOINTMENT (INPATIENT)
Dept: RADIOLOGY | Facility: HOSPITAL | Age: 67
DRG: 208 | End: 2017-04-10
Payer: MEDICARE

## 2017-04-10 LAB
ANION GAP SERPL CALCULATED.3IONS-SCNC: 5 MMOL/L (ref 4–13)
ATRIAL RATE: 100 BPM
ATRIAL RATE: 100 BPM
BASE EX.OXY STD BLDV CALC-SCNC: 68.7 % (ref 60–80)
BASE EXCESS BLDV CALC-SCNC: 5.5 MMOL/L
BASOPHILS # BLD MANUAL: 0 THOUSAND/UL (ref 0–0.1)
BASOPHILS NFR MAR MANUAL: 0 % (ref 0–1)
BUN SERPL-MCNC: 39 MG/DL (ref 5–25)
CALCIUM SERPL-MCNC: 8.7 MG/DL (ref 8.3–10.1)
CHLORIDE SERPL-SCNC: 103 MMOL/L (ref 100–108)
CO2 SERPL-SCNC: 32 MMOL/L (ref 21–32)
CREAT SERPL-MCNC: 1.97 MG/DL (ref 0.6–1.3)
EOSINOPHIL # BLD MANUAL: 0 THOUSAND/UL (ref 0–0.4)
EOSINOPHIL NFR BLD MANUAL: 0 % (ref 0–6)
ERYTHROCYTE [DISTWIDTH] IN BLOOD BY AUTOMATED COUNT: 15 % (ref 11.6–15.1)
GFR SERPL CREATININE-BSD FRML MDRD: 34 ML/MIN/1.73SQ M
GLUCOSE SERPL-MCNC: 160 MG/DL (ref 65–140)
GLUCOSE SERPL-MCNC: 182 MG/DL (ref 65–140)
GLUCOSE SERPL-MCNC: 183 MG/DL (ref 65–140)
GLUCOSE SERPL-MCNC: 242 MG/DL (ref 65–140)
GLUCOSE SERPL-MCNC: 267 MG/DL (ref 65–140)
HCO3 BLDV-SCNC: 32.5 MMOL/L (ref 24–30)
HCT VFR BLD AUTO: 42.4 % (ref 36.5–49.3)
HGB BLD-MCNC: 13.2 G/DL (ref 12–17)
IPAP: 10
LYMPHOCYTES # BLD AUTO: 0.62 THOUSAND/UL (ref 0.6–4.47)
LYMPHOCYTES # BLD AUTO: 4 % (ref 14–44)
MCH RBC QN AUTO: 27.8 PG (ref 26.8–34.3)
MCHC RBC AUTO-ENTMCNC: 31.1 G/DL (ref 31.4–37.4)
MCV RBC AUTO: 89 FL (ref 82–98)
MONOCYTES # BLD AUTO: 0.31 THOUSAND/UL (ref 0–1.22)
MONOCYTES NFR BLD: 2 % (ref 4–12)
NEUTROPHILS # BLD MANUAL: 14.49 THOUSAND/UL (ref 1.85–7.62)
NEUTS SEG NFR BLD AUTO: 94 % (ref 43–75)
NON VENT- BIPAP: ABNORMAL
NRBC BLD AUTO-RTO: 0 /100 WBCS
O2 CT BLDV-SCNC: 13.2 ML/DL
PCO2 BLDV: 58 MM HG (ref 42–50)
PEEP MAX SETTING VENT: 5 CM[H2O]
PH BLDV: 7.37 [PH] (ref 7.3–7.4)
PLATELET # BLD AUTO: 251 THOUSANDS/UL (ref 149–390)
PLATELET BLD QL SMEAR: ADEQUATE
PMV BLD AUTO: 9.7 FL (ref 8.9–12.7)
PO2 BLDV: 39.2 MM HG (ref 35–45)
POTASSIUM SERPL-SCNC: 4.1 MMOL/L (ref 3.5–5.3)
QRS AXIS: -75 DEGREES
QRS AXIS: 134 DEGREES
QRSD INTERVAL: 83 MS
QRSD INTERVAL: 86 MS
QT INTERVAL: 322 MS
QT INTERVAL: 350 MS
QTC INTERVAL: 416 MS
QTC INTERVAL: 421 MS
RBC # BLD AUTO: 4.75 MILLION/UL (ref 3.88–5.62)
RBC MORPH BLD: NORMAL
SODIUM SERPL-SCNC: 140 MMOL/L (ref 136–145)
T WAVE AXIS: 12 DEGREES
T WAVE AXIS: 73 DEGREES
VENT BIPAP FIO2: 40 %
VENTRICULAR RATE: 103 BPM
VENTRICULAR RATE: 85 BPM
WBC # BLD AUTO: 15.42 THOUSAND/UL (ref 4.31–10.16)

## 2017-04-10 PROCEDURE — 85007 BL SMEAR W/DIFF WBC COUNT: CPT | Performed by: EMERGENCY MEDICINE

## 2017-04-10 PROCEDURE — 82948 REAGENT STRIP/BLOOD GLUCOSE: CPT

## 2017-04-10 PROCEDURE — 82805 BLOOD GASES W/O2 SATURATION: CPT | Performed by: EMERGENCY MEDICINE

## 2017-04-10 PROCEDURE — C9113 INJ PANTOPRAZOLE SODIUM, VIA: HCPCS | Performed by: EMERGENCY MEDICINE

## 2017-04-10 PROCEDURE — 94640 AIRWAY INHALATION TREATMENT: CPT

## 2017-04-10 PROCEDURE — 93880 EXTRACRANIAL BILAT STUDY: CPT

## 2017-04-10 PROCEDURE — 85027 COMPLETE CBC AUTOMATED: CPT | Performed by: EMERGENCY MEDICINE

## 2017-04-10 PROCEDURE — 94660 CPAP INITIATION&MGMT: CPT

## 2017-04-10 PROCEDURE — 94760 N-INVAS EAR/PLS OXIMETRY 1: CPT

## 2017-04-10 PROCEDURE — 80048 BASIC METABOLIC PNL TOTAL CA: CPT | Performed by: EMERGENCY MEDICINE

## 2017-04-10 RX ORDER — LEVALBUTEROL 1.25 MG/.5ML
1.25 SOLUTION, CONCENTRATE RESPIRATORY (INHALATION) EVERY 6 HOURS PRN
Status: DISCONTINUED | OUTPATIENT
Start: 2017-04-10 | End: 2017-04-12 | Stop reason: HOSPADM

## 2017-04-10 RX ORDER — LEVALBUTEROL 1.25 MG/.5ML
1.25 SOLUTION, CONCENTRATE RESPIRATORY (INHALATION)
Status: DISCONTINUED | OUTPATIENT
Start: 2017-04-10 | End: 2017-04-12 | Stop reason: HOSPADM

## 2017-04-10 RX ADMIN — FOLIC ACID 1 MG: 1 TABLET ORAL at 09:32

## 2017-04-10 RX ADMIN — ALLOPURINOL 100 MG: 100 TABLET ORAL at 09:33

## 2017-04-10 RX ADMIN — ATORVASTATIN CALCIUM 80 MG: 40 TABLET, FILM COATED ORAL at 17:46

## 2017-04-10 RX ADMIN — LOSARTAN POTASSIUM 75 MG: 50 TABLET, FILM COATED ORAL at 09:32

## 2017-04-10 RX ADMIN — BUDESONIDE AND FORMOTEROL FUMARATE DIHYDRATE 2 PUFF: 160; 4.5 AEROSOL RESPIRATORY (INHALATION) at 17:45

## 2017-04-10 RX ADMIN — INSULIN LISPRO 2 UNITS: 100 INJECTION, SOLUTION INTRAVENOUS; SUBCUTANEOUS at 12:47

## 2017-04-10 RX ADMIN — FUROSEMIDE 40 MG: 10 INJECTION, SOLUTION INTRAMUSCULAR; INTRAVENOUS at 09:31

## 2017-04-10 RX ADMIN — INSULIN LISPRO 2 UNITS: 100 INJECTION, SOLUTION INTRAVENOUS; SUBCUTANEOUS at 17:46

## 2017-04-10 RX ADMIN — INSULIN LISPRO 1 UNITS: 100 INJECTION, SOLUTION INTRAVENOUS; SUBCUTANEOUS at 23:34

## 2017-04-10 RX ADMIN — LEVALBUTEROL HYDROCHLORIDE 1.25 MG: 1.25 SOLUTION, CONCENTRATE RESPIRATORY (INHALATION) at 19:35

## 2017-04-10 RX ADMIN — NIACIN 500 MG: 500 TABLET, FILM COATED, EXTENDED RELEASE ORAL at 23:34

## 2017-04-10 RX ADMIN — LEVALBUTEROL HYDROCHLORIDE 1.25 MG: 1.25 SOLUTION, CONCENTRATE RESPIRATORY (INHALATION) at 14:54

## 2017-04-10 RX ADMIN — HYDROCHLOROTHIAZIDE 50 MG: 50 TABLET ORAL at 09:33

## 2017-04-10 RX ADMIN — BUDESONIDE AND FORMOTEROL FUMARATE DIHYDRATE 2 PUFF: 160; 4.5 AEROSOL RESPIRATORY (INHALATION) at 09:33

## 2017-04-10 RX ADMIN — ASPIRIN 81 MG: 81 TABLET, CHEWABLE ORAL at 09:32

## 2017-04-10 RX ADMIN — LEVALBUTEROL HYDROCHLORIDE 1.25 MG: 1.25 SOLUTION, CONCENTRATE RESPIRATORY (INHALATION) at 09:00

## 2017-04-10 RX ADMIN — IPRATROPIUM BROMIDE 0.5 MG: 0.5 SOLUTION RESPIRATORY (INHALATION) at 19:34

## 2017-04-10 RX ADMIN — PANTOPRAZOLE SODIUM 40 MG: 40 INJECTION, POWDER, FOR SOLUTION INTRAVENOUS at 09:31

## 2017-04-10 RX ADMIN — DABIGATRAN ETEXILATE MESYLATE 150 MG: 150 CAPSULE ORAL at 17:46

## 2017-04-10 RX ADMIN — TAMSULOSIN HYDROCHLORIDE 0.8 MG: 0.4 CAPSULE ORAL at 17:46

## 2017-04-10 RX ADMIN — DABIGATRAN ETEXILATE MESYLATE 150 MG: 150 CAPSULE ORAL at 09:33

## 2017-04-10 RX ADMIN — IPRATROPIUM BROMIDE 0.5 MG: 0.5 SOLUTION RESPIRATORY (INHALATION) at 09:00

## 2017-04-10 RX ADMIN — POTASSIUM CHLORIDE 20 MEQ: 1500 TABLET, EXTENDED RELEASE ORAL at 09:32

## 2017-04-10 RX ADMIN — IPRATROPIUM BROMIDE 0.5 MG: 0.5 SOLUTION RESPIRATORY (INHALATION) at 14:54

## 2017-04-10 RX ADMIN — INSULIN LISPRO 1 UNITS: 100 INJECTION, SOLUTION INTRAVENOUS; SUBCUTANEOUS at 06:27

## 2017-04-10 RX ADMIN — AMLODIPINE BESYLATE 10 MG: 10 TABLET ORAL at 09:32

## 2017-04-10 RX ADMIN — METHYLPREDNISOLONE SODIUM SUCCINATE 40 MG: 40 INJECTION, POWDER, FOR SOLUTION INTRAMUSCULAR; INTRAVENOUS at 20:54

## 2017-04-10 RX ADMIN — LEVALBUTEROL HYDROCHLORIDE 1.25 MG: 1.25 SOLUTION, CONCENTRATE RESPIRATORY (INHALATION) at 01:15

## 2017-04-10 RX ADMIN — IPRATROPIUM BROMIDE 0.5 MG: 0.5 SOLUTION RESPIRATORY (INHALATION) at 01:15

## 2017-04-10 RX ADMIN — METHYLPREDNISOLONE SODIUM SUCCINATE 40 MG: 40 INJECTION, POWDER, FOR SOLUTION INTRAMUSCULAR; INTRAVENOUS at 09:31

## 2017-04-11 LAB
ANION GAP SERPL CALCULATED.3IONS-SCNC: 5 MMOL/L (ref 4–13)
BASOPHILS # BLD MANUAL: 0 THOUSAND/UL (ref 0–0.1)
BASOPHILS NFR MAR MANUAL: 0 % (ref 0–1)
BUN SERPL-MCNC: 45 MG/DL (ref 5–25)
BURR CELLS BLD QL SMEAR: PRESENT
CALCIUM SERPL-MCNC: 8.8 MG/DL (ref 8.3–10.1)
CHLORIDE SERPL-SCNC: 99 MMOL/L (ref 100–108)
CO2 SERPL-SCNC: 34 MMOL/L (ref 21–32)
CREAT SERPL-MCNC: 1.86 MG/DL (ref 0.6–1.3)
EOSINOPHIL # BLD MANUAL: 0 THOUSAND/UL (ref 0–0.4)
EOSINOPHIL NFR BLD MANUAL: 0 % (ref 0–6)
ERYTHROCYTE [DISTWIDTH] IN BLOOD BY AUTOMATED COUNT: 15.1 % (ref 11.6–15.1)
GFR SERPL CREATININE-BSD FRML MDRD: 36.3 ML/MIN/1.73SQ M
GLUCOSE SERPL-MCNC: 157 MG/DL (ref 65–140)
GLUCOSE SERPL-MCNC: 163 MG/DL (ref 65–140)
GLUCOSE SERPL-MCNC: 173 MG/DL (ref 65–140)
GLUCOSE SERPL-MCNC: 181 MG/DL (ref 65–140)
GLUCOSE SERPL-MCNC: 234 MG/DL (ref 65–140)
GLUCOSE SERPL-MCNC: 250 MG/DL (ref 65–140)
HCT VFR BLD AUTO: 42.9 % (ref 36.5–49.3)
HGB BLD-MCNC: 13.4 G/DL (ref 12–17)
LYMPHOCYTES # BLD AUTO: 0.48 THOUSAND/UL (ref 0.6–4.47)
LYMPHOCYTES # BLD AUTO: 3 % (ref 14–44)
MCH RBC QN AUTO: 27.6 PG (ref 26.8–34.3)
MCHC RBC AUTO-ENTMCNC: 31.2 G/DL (ref 31.4–37.4)
MCV RBC AUTO: 89 FL (ref 82–98)
MONOCYTES # BLD AUTO: 0.32 THOUSAND/UL (ref 0–1.22)
MONOCYTES NFR BLD: 2 % (ref 4–12)
NEUTROPHILS # BLD MANUAL: 15.15 THOUSAND/UL (ref 1.85–7.62)
NEUTS SEG NFR BLD AUTO: 95 % (ref 43–75)
NRBC BLD AUTO-RTO: 0 /100 WBCS
PLATELET # BLD AUTO: 252 THOUSANDS/UL (ref 149–390)
PLATELET BLD QL SMEAR: ADEQUATE
PMV BLD AUTO: 9.8 FL (ref 8.9–12.7)
POIKILOCYTOSIS BLD QL SMEAR: PRESENT
POTASSIUM SERPL-SCNC: 4.1 MMOL/L (ref 3.5–5.3)
RBC # BLD AUTO: 4.85 MILLION/UL (ref 3.88–5.62)
RBC MORPH BLD: PRESENT
SODIUM SERPL-SCNC: 138 MMOL/L (ref 136–145)
WBC # BLD AUTO: 15.95 THOUSAND/UL (ref 4.31–10.16)

## 2017-04-11 PROCEDURE — 97163 PT EVAL HIGH COMPLEX 45 MIN: CPT

## 2017-04-11 PROCEDURE — 94760 N-INVAS EAR/PLS OXIMETRY 1: CPT

## 2017-04-11 PROCEDURE — 85027 COMPLETE CBC AUTOMATED: CPT | Performed by: EMERGENCY MEDICINE

## 2017-04-11 PROCEDURE — C9113 INJ PANTOPRAZOLE SODIUM, VIA: HCPCS | Performed by: EMERGENCY MEDICINE

## 2017-04-11 PROCEDURE — 80048 BASIC METABOLIC PNL TOTAL CA: CPT | Performed by: EMERGENCY MEDICINE

## 2017-04-11 PROCEDURE — 94640 AIRWAY INHALATION TREATMENT: CPT

## 2017-04-11 PROCEDURE — 97166 OT EVAL MOD COMPLEX 45 MIN: CPT

## 2017-04-11 PROCEDURE — G8978 MOBILITY CURRENT STATUS: HCPCS

## 2017-04-11 PROCEDURE — G8979 MOBILITY GOAL STATUS: HCPCS

## 2017-04-11 PROCEDURE — G8987 SELF CARE CURRENT STATUS: HCPCS

## 2017-04-11 PROCEDURE — G8988 SELF CARE GOAL STATUS: HCPCS

## 2017-04-11 PROCEDURE — 82948 REAGENT STRIP/BLOOD GLUCOSE: CPT

## 2017-04-11 PROCEDURE — 85007 BL SMEAR W/DIFF WBC COUNT: CPT | Performed by: EMERGENCY MEDICINE

## 2017-04-11 PROCEDURE — 94660 CPAP INITIATION&MGMT: CPT

## 2017-04-11 RX ADMIN — POTASSIUM CHLORIDE 20 MEQ: 1500 TABLET, EXTENDED RELEASE ORAL at 08:55

## 2017-04-11 RX ADMIN — AMLODIPINE BESYLATE 10 MG: 10 TABLET ORAL at 08:55

## 2017-04-11 RX ADMIN — DABIGATRAN ETEXILATE MESYLATE 150 MG: 150 CAPSULE ORAL at 08:56

## 2017-04-11 RX ADMIN — FOLIC ACID 1 MG: 1 TABLET ORAL at 08:55

## 2017-04-11 RX ADMIN — TAMSULOSIN HYDROCHLORIDE 0.8 MG: 0.4 CAPSULE ORAL at 16:20

## 2017-04-11 RX ADMIN — NIACIN 500 MG: 500 TABLET, FILM COATED, EXTENDED RELEASE ORAL at 21:45

## 2017-04-11 RX ADMIN — BUDESONIDE AND FORMOTEROL FUMARATE DIHYDRATE 2 PUFF: 160; 4.5 AEROSOL RESPIRATORY (INHALATION) at 18:50

## 2017-04-11 RX ADMIN — PANTOPRAZOLE SODIUM 40 MG: 40 INJECTION, POWDER, FOR SOLUTION INTRAVENOUS at 08:55

## 2017-04-11 RX ADMIN — INSULIN LISPRO 2 UNITS: 100 INJECTION, SOLUTION INTRAVENOUS; SUBCUTANEOUS at 18:48

## 2017-04-11 RX ADMIN — ASPIRIN 81 MG: 81 TABLET, CHEWABLE ORAL at 08:55

## 2017-04-11 RX ADMIN — IPRATROPIUM BROMIDE 0.5 MG: 0.5 SOLUTION RESPIRATORY (INHALATION) at 13:03

## 2017-04-11 RX ADMIN — LEVALBUTEROL HYDROCHLORIDE 1.25 MG: 1.25 SOLUTION, CONCENTRATE RESPIRATORY (INHALATION) at 13:03

## 2017-04-11 RX ADMIN — METHYLPREDNISOLONE SODIUM SUCCINATE 40 MG: 40 INJECTION, POWDER, FOR SOLUTION INTRAMUSCULAR; INTRAVENOUS at 21:46

## 2017-04-11 RX ADMIN — INSULIN LISPRO 1 UNITS: 100 INJECTION, SOLUTION INTRAVENOUS; SUBCUTANEOUS at 06:57

## 2017-04-11 RX ADMIN — IPRATROPIUM BROMIDE 0.5 MG: 0.5 SOLUTION RESPIRATORY (INHALATION) at 19:01

## 2017-04-11 RX ADMIN — METHYLPREDNISOLONE SODIUM SUCCINATE 40 MG: 40 INJECTION, POWDER, FOR SOLUTION INTRAMUSCULAR; INTRAVENOUS at 08:55

## 2017-04-11 RX ADMIN — LEVALBUTEROL HYDROCHLORIDE 1.25 MG: 1.25 SOLUTION, CONCENTRATE RESPIRATORY (INHALATION) at 19:01

## 2017-04-11 RX ADMIN — ALLOPURINOL 100 MG: 100 TABLET ORAL at 08:56

## 2017-04-11 RX ADMIN — ATORVASTATIN CALCIUM 80 MG: 40 TABLET, FILM COATED ORAL at 16:20

## 2017-04-11 RX ADMIN — INSULIN LISPRO 2 UNITS: 100 INJECTION, SOLUTION INTRAVENOUS; SUBCUTANEOUS at 11:41

## 2017-04-11 RX ADMIN — DABIGATRAN ETEXILATE MESYLATE 150 MG: 150 CAPSULE ORAL at 18:48

## 2017-04-11 RX ADMIN — BUDESONIDE AND FORMOTEROL FUMARATE DIHYDRATE 2 PUFF: 160; 4.5 AEROSOL RESPIRATORY (INHALATION) at 08:56

## 2017-04-12 VITALS
TEMPERATURE: 98.2 F | WEIGHT: 293.87 LBS | SYSTOLIC BLOOD PRESSURE: 153 MMHG | HEART RATE: 82 BPM | RESPIRATION RATE: 18 BRPM | HEIGHT: 71 IN | DIASTOLIC BLOOD PRESSURE: 82 MMHG | BODY MASS INDEX: 41.14 KG/M2 | OXYGEN SATURATION: 91 %

## 2017-04-12 LAB
ANION GAP SERPL CALCULATED.3IONS-SCNC: 8 MMOL/L (ref 4–13)
BASOPHILS # BLD MANUAL: 0 THOUSAND/UL (ref 0–0.1)
BASOPHILS NFR MAR MANUAL: 0 % (ref 0–1)
BUN SERPL-MCNC: 43 MG/DL (ref 5–25)
CALCIUM SERPL-MCNC: 8.7 MG/DL (ref 8.3–10.1)
CHLORIDE SERPL-SCNC: 99 MMOL/L (ref 100–108)
CO2 SERPL-SCNC: 31 MMOL/L (ref 21–32)
CREAT SERPL-MCNC: 1.62 MG/DL (ref 0.6–1.3)
EOSINOPHIL # BLD MANUAL: 0 THOUSAND/UL (ref 0–0.4)
EOSINOPHIL NFR BLD MANUAL: 0 % (ref 0–6)
ERYTHROCYTE [DISTWIDTH] IN BLOOD BY AUTOMATED COUNT: 14.9 % (ref 11.6–15.1)
GFR SERPL CREATININE-BSD FRML MDRD: 42.6 ML/MIN/1.73SQ M
GLUCOSE SERPL-MCNC: 179 MG/DL (ref 65–140)
GLUCOSE SERPL-MCNC: 186 MG/DL (ref 65–140)
GLUCOSE SERPL-MCNC: 201 MG/DL (ref 65–140)
GLUCOSE SERPL-MCNC: 205 MG/DL (ref 65–140)
GLUCOSE SERPL-MCNC: 214 MG/DL (ref 65–140)
HCT VFR BLD AUTO: 42.6 % (ref 36.5–49.3)
HGB BLD-MCNC: 13.3 G/DL (ref 12–17)
LYMPHOCYTES # BLD AUTO: 0.35 THOUSAND/UL (ref 0.6–4.47)
LYMPHOCYTES # BLD AUTO: 3 % (ref 14–44)
MCH RBC QN AUTO: 27.8 PG (ref 26.8–34.3)
MCHC RBC AUTO-ENTMCNC: 31.2 G/DL (ref 31.4–37.4)
MCV RBC AUTO: 89 FL (ref 82–98)
MONOCYTES # BLD AUTO: 0 THOUSAND/UL (ref 0–1.22)
MONOCYTES NFR BLD: 0 % (ref 4–12)
NEUTROPHILS # BLD MANUAL: 11.11 THOUSAND/UL (ref 1.85–7.62)
NEUTS SEG NFR BLD AUTO: 96 % (ref 43–75)
NRBC BLD AUTO-RTO: 0 /100 WBCS
PLATELET # BLD AUTO: 260 THOUSANDS/UL (ref 149–390)
PLATELET BLD QL SMEAR: ADEQUATE
PMV BLD AUTO: 10 FL (ref 8.9–12.7)
POIKILOCYTOSIS BLD QL SMEAR: PRESENT
POTASSIUM SERPL-SCNC: 4.4 MMOL/L (ref 3.5–5.3)
RBC # BLD AUTO: 4.79 MILLION/UL (ref 3.88–5.62)
RBC MORPH BLD: PRESENT
SODIUM SERPL-SCNC: 138 MMOL/L (ref 136–145)
VARIANT LYMPHS # BLD AUTO: 1 %
WBC # BLD AUTO: 11.57 THOUSAND/UL (ref 4.31–10.16)

## 2017-04-12 PROCEDURE — C9113 INJ PANTOPRAZOLE SODIUM, VIA: HCPCS | Performed by: EMERGENCY MEDICINE

## 2017-04-12 PROCEDURE — 85027 COMPLETE CBC AUTOMATED: CPT | Performed by: EMERGENCY MEDICINE

## 2017-04-12 PROCEDURE — 80048 BASIC METABOLIC PNL TOTAL CA: CPT | Performed by: EMERGENCY MEDICINE

## 2017-04-12 PROCEDURE — 82948 REAGENT STRIP/BLOOD GLUCOSE: CPT

## 2017-04-12 PROCEDURE — 97110 THERAPEUTIC EXERCISES: CPT

## 2017-04-12 PROCEDURE — 97116 GAIT TRAINING THERAPY: CPT

## 2017-04-12 PROCEDURE — 85007 BL SMEAR W/DIFF WBC COUNT: CPT | Performed by: EMERGENCY MEDICINE

## 2017-04-12 PROCEDURE — 94760 N-INVAS EAR/PLS OXIMETRY 1: CPT

## 2017-04-12 PROCEDURE — 94640 AIRWAY INHALATION TREATMENT: CPT

## 2017-04-12 RX ORDER — FUROSEMIDE 20 MG/1
20 TABLET ORAL DAILY
Qty: 30 TABLET | Refills: 0 | Status: SHIPPED | OUTPATIENT
Start: 2017-04-12 | End: 2017-05-12

## 2017-04-12 RX ORDER — DABIGATRAN ETEXILATE 150 MG/1
150 CAPSULE, COATED PELLETS ORAL 2 TIMES DAILY
Qty: 60 CAPSULE | Refills: 0 | Status: SHIPPED | OUTPATIENT
Start: 2017-04-12 | End: 2017-05-12

## 2017-04-12 RX ADMIN — IPRATROPIUM BROMIDE 0.5 MG: 0.5 SOLUTION RESPIRATORY (INHALATION) at 13:21

## 2017-04-12 RX ADMIN — ATORVASTATIN CALCIUM 80 MG: 40 TABLET, FILM COATED ORAL at 16:01

## 2017-04-12 RX ADMIN — ALLOPURINOL 100 MG: 100 TABLET ORAL at 08:40

## 2017-04-12 RX ADMIN — INSULIN LISPRO 1 UNITS: 100 INJECTION, SOLUTION INTRAVENOUS; SUBCUTANEOUS at 08:41

## 2017-04-12 RX ADMIN — INSULIN LISPRO 1 UNITS: 100 INJECTION, SOLUTION INTRAVENOUS; SUBCUTANEOUS at 01:24

## 2017-04-12 RX ADMIN — INSULIN LISPRO 1 UNITS: 100 INJECTION, SOLUTION INTRAVENOUS; SUBCUTANEOUS at 12:14

## 2017-04-12 RX ADMIN — AMLODIPINE BESYLATE 10 MG: 10 TABLET ORAL at 08:40

## 2017-04-12 RX ADMIN — LEVALBUTEROL HYDROCHLORIDE 1.25 MG: 1.25 SOLUTION, CONCENTRATE RESPIRATORY (INHALATION) at 13:20

## 2017-04-12 RX ADMIN — PANTOPRAZOLE SODIUM 40 MG: 40 INJECTION, POWDER, FOR SOLUTION INTRAVENOUS at 08:40

## 2017-04-12 RX ADMIN — METHYLPREDNISOLONE SODIUM SUCCINATE 40 MG: 40 INJECTION, POWDER, FOR SOLUTION INTRAMUSCULAR; INTRAVENOUS at 08:40

## 2017-04-12 RX ADMIN — BUDESONIDE AND FORMOTEROL FUMARATE DIHYDRATE 2 PUFF: 160; 4.5 AEROSOL RESPIRATORY (INHALATION) at 08:39

## 2017-04-12 RX ADMIN — ASPIRIN 81 MG: 81 TABLET, CHEWABLE ORAL at 08:40

## 2017-04-12 RX ADMIN — DABIGATRAN ETEXILATE MESYLATE 150 MG: 150 CAPSULE ORAL at 08:40

## 2017-04-12 RX ADMIN — POTASSIUM CHLORIDE 20 MEQ: 1500 TABLET, EXTENDED RELEASE ORAL at 08:40

## 2017-04-12 RX ADMIN — BUDESONIDE AND FORMOTEROL FUMARATE DIHYDRATE 2 PUFF: 160; 4.5 AEROSOL RESPIRATORY (INHALATION) at 16:02

## 2017-04-12 RX ADMIN — INSULIN LISPRO 1 UNITS: 100 INJECTION, SOLUTION INTRAVENOUS; SUBCUTANEOUS at 16:04

## 2017-04-12 RX ADMIN — FOLIC ACID 1 MG: 1 TABLET ORAL at 08:40

## 2017-04-12 RX ADMIN — LEVALBUTEROL HYDROCHLORIDE 1.25 MG: 1.25 SOLUTION, CONCENTRATE RESPIRATORY (INHALATION) at 07:08

## 2017-04-12 RX ADMIN — TAMSULOSIN HYDROCHLORIDE 0.8 MG: 0.4 CAPSULE ORAL at 16:01

## 2017-04-12 RX ADMIN — IPRATROPIUM BROMIDE 0.5 MG: 0.5 SOLUTION RESPIRATORY (INHALATION) at 07:08

## 2017-04-12 RX ADMIN — DABIGATRAN ETEXILATE MESYLATE 150 MG: 150 CAPSULE ORAL at 16:01

## 2018-02-26 ENCOUNTER — APPOINTMENT (OUTPATIENT)
Dept: RADIOLOGY | Facility: CLINIC | Age: 68
End: 2018-02-26
Payer: MEDICARE

## 2018-02-26 ENCOUNTER — OFFICE VISIT (OUTPATIENT)
Dept: URGENT CARE | Facility: CLINIC | Age: 68
End: 2018-02-26
Payer: MEDICARE

## 2018-02-26 VITALS
WEIGHT: 294.6 LBS | OXYGEN SATURATION: 97 % | TEMPERATURE: 98 F | SYSTOLIC BLOOD PRESSURE: 150 MMHG | BODY MASS INDEX: 41.24 KG/M2 | HEART RATE: 94 BPM | HEIGHT: 71 IN | RESPIRATION RATE: 16 BRPM | DIASTOLIC BLOOD PRESSURE: 82 MMHG

## 2018-02-26 DIAGNOSIS — M79.89 SWELLING OF LEFT HAND: ICD-10-CM

## 2018-02-26 DIAGNOSIS — M79.89 SWELLING OF LEFT HAND: Primary | ICD-10-CM

## 2018-02-26 PROCEDURE — 99213 OFFICE O/P EST LOW 20 MIN: CPT | Performed by: EMERGENCY MEDICINE

## 2018-02-26 PROCEDURE — G0463 HOSPITAL OUTPT CLINIC VISIT: HCPCS | Performed by: EMERGENCY MEDICINE

## 2018-02-26 PROCEDURE — 73110 X-RAY EXAM OF WRIST: CPT

## 2018-02-26 PROCEDURE — 73130 X-RAY EXAM OF HAND: CPT

## 2018-02-26 RX ORDER — FUROSEMIDE 40 MG/1
TABLET ORAL
COMMUNITY
Start: 2018-02-16 | End: 2021-03-16 | Stop reason: CLARIF

## 2018-02-26 RX ORDER — ATENOLOL 100 MG/1
150 TABLET ORAL 2 TIMES DAILY
COMMUNITY
Start: 2017-11-27 | End: 2022-06-06 | Stop reason: ALTCHOICE

## 2018-02-26 RX ORDER — PREDNISONE 20 MG/1
40 TABLET ORAL DAILY
Qty: 8 TABLET | Refills: 0 | Status: SHIPPED | OUTPATIENT
Start: 2018-02-26 | End: 2018-03-02

## 2018-02-26 NOTE — PATIENT INSTRUCTIONS
Splint, rest, ice, elevation, Prednisone once a day, contact [facility]CP or [facility] for follow-[facility] later this week

## 2018-02-26 NOTE — PROGRESS NOTES
Assessment/Plan: swelling L hand  DJD Thumb  No problem-specific Assessment & Plan notes found for this encounter  Diagnoses and all orders for this visit:    Swelling of left hand    Other orders  -     furosemide (LASIX) 40 mg tablet;   -     PRADAXA 150 MG capsu;           Subjective:      Patient ID: Kyrie Duvall is a 69 y o  male  Pt noted swelling of L hand since yesterday; no hx of trauma  Tender around base of thumb, worse pain with motion      Hand Pain    The incident occurred 12 to 24 hours ago  There was no injury mechanism  The pain is present in the left hand and left wrist  The quality of the pain is described as aching  The pain does not radiate  The pain is at a severity of 4/10  The pain is moderate  The pain has been constant since the incident  Nothing aggravates the symptoms  He has tried nothing for the symptoms  The treatment provided no relief  The following portions of the patient's history were reviewed and updated as appropriate: current medications, past family history, past medical history, past social history, past surgical history and problem list     Review of Systems   Musculoskeletal: Positive for arthralgias (L hand, wrist swollen, tender along thumb)  All other systems reviewed and are negative  Objective:      /82   Pulse 94   Temp 98 °F (36 7 °C) (Tympanic)   Resp 16   Ht 5' 11" (1 803 m)   Wt 134 kg (294 lb 9 6 oz)   SpO2 97%   BMI 41 09 kg/m²          Physical Exam   Constitutional: He is oriented to person, place, and time  He appears well-developed and well-nourished  HENT:   Nose: Nose normal    Eyes: Pupils are equal, round, and reactive to light  Neck: Normal range of motion  Cardiovascular: Normal rate  Pulmonary/Chest: Effort normal    Abdominal: Soft  Musculoskeletal:        Arms:       Hands:  Neurological: He is alert and oriented to person, place, and time  Skin: Skin is warm and dry     Psychiatric: He has a normal mood and affect   His behavior is normal  Judgment and thought content normal

## 2018-02-26 NOTE — PROGRESS NOTES
Orthopedic injury treatment  Date/Time: 2/26/2018 11:58 AM  Performed by: Matthew Stallings  Authorized by: Matthew Stallings   Consent: Verbal consent obtained    Consent given by: patient  Injury location: hand  Location details: left hand  Injury type: soft tissue  Pre-procedure neurovascular assessment: neurovascularly intact    Anesthesia:  Local anesthesia used: no  Immobilization: splint  Splint type: radial gutter  Supplies used: fiberglass  Post-procedure neurovascular assessment: post-procedure neurovascularly intact

## 2020-06-10 LAB — EXT SARS-COV-2: NOT DETECTED

## 2020-08-20 LAB — HBA1C MFR BLD HPLC: 5.6 %

## 2020-09-25 LAB
LEFT EYE DIABETIC RETINOPATHY: NORMAL
RIGHT EYE DIABETIC RETINOPATHY: NORMAL

## 2021-03-15 ENCOUNTER — APPOINTMENT (EMERGENCY)
Dept: RADIOLOGY | Facility: HOSPITAL | Age: 71
DRG: 291 | End: 2021-03-15
Payer: COMMERCIAL

## 2021-03-15 ENCOUNTER — HOSPITAL ENCOUNTER (INPATIENT)
Facility: HOSPITAL | Age: 71
LOS: 1 days | Discharge: HOME | DRG: 291 | End: 2021-03-17
Attending: EMERGENCY MEDICINE | Admitting: INTERNAL MEDICINE
Payer: COMMERCIAL

## 2021-03-15 DIAGNOSIS — I50.9 ACUTE EXACERBATION OF CHF (CONGESTIVE HEART FAILURE) (HCC): ICD-10-CM

## 2021-03-15 DIAGNOSIS — R09.02 HYPOXIA: ICD-10-CM

## 2021-03-15 DIAGNOSIS — R07.9 CHEST PAIN: Primary | ICD-10-CM

## 2021-03-15 LAB
ALBUMIN SERPL BCP-MCNC: 3.7 G/DL (ref 3.5–5)
ALP SERPL-CCNC: 71 U/L (ref 46–116)
ALT SERPL W P-5'-P-CCNC: 31 U/L (ref 12–78)
ANION GAP SERPL CALCULATED.3IONS-SCNC: 10 MMOL/L (ref 4–13)
APTT PPP: 53 SECONDS (ref 23–37)
AST SERPL W P-5'-P-CCNC: 27 U/L (ref 5–45)
BASOPHILS # BLD AUTO: 0.04 THOUSANDS/ΜL (ref 0–0.1)
BASOPHILS NFR BLD AUTO: 0 % (ref 0–1)
BILIRUB SERPL-MCNC: 0.5 MG/DL (ref 0.2–1)
BUN SERPL-MCNC: 35 MG/DL (ref 5–25)
CALCIUM SERPL-MCNC: 8.7 MG/DL (ref 8.3–10.1)
CHLORIDE SERPL-SCNC: 107 MMOL/L (ref 100–108)
CO2 SERPL-SCNC: 27 MMOL/L (ref 21–32)
CREAT SERPL-MCNC: 2.09 MG/DL (ref 0.6–1.3)
EOSINOPHIL # BLD AUTO: 0.31 THOUSAND/ΜL (ref 0–0.61)
EOSINOPHIL NFR BLD AUTO: 3 % (ref 0–6)
ERYTHROCYTE [DISTWIDTH] IN BLOOD BY AUTOMATED COUNT: 16.8 % (ref 11.6–15.1)
GFR SERPL CREATININE-BSD FRML MDRD: 31 ML/MIN/1.73SQ M
GLUCOSE SERPL-MCNC: 112 MG/DL (ref 65–140)
HCT VFR BLD AUTO: 38 % (ref 36.5–49.3)
HGB BLD-MCNC: 11.8 G/DL (ref 12–17)
IMM GRANULOCYTES # BLD AUTO: 0.04 THOUSAND/UL (ref 0–0.2)
IMM GRANULOCYTES NFR BLD AUTO: 0 % (ref 0–2)
INR PPP: 1.58 (ref 0.84–1.19)
LYMPHOCYTES # BLD AUTO: 2.76 THOUSANDS/ΜL (ref 0.6–4.47)
LYMPHOCYTES NFR BLD AUTO: 24 % (ref 14–44)
MCH RBC QN AUTO: 28.6 PG (ref 26.8–34.3)
MCHC RBC AUTO-ENTMCNC: 31.1 G/DL (ref 31.4–37.4)
MCV RBC AUTO: 92 FL (ref 82–98)
MONOCYTES # BLD AUTO: 0.97 THOUSAND/ΜL (ref 0.17–1.22)
MONOCYTES NFR BLD AUTO: 8 % (ref 4–12)
NEUTROPHILS # BLD AUTO: 7.36 THOUSANDS/ΜL (ref 1.85–7.62)
NEUTS SEG NFR BLD AUTO: 65 % (ref 43–75)
NT-PROBNP SERPL-MCNC: 3579 PG/ML
PLATELET # BLD AUTO: 243 THOUSANDS/UL (ref 149–390)
PMV BLD AUTO: 9.6 FL (ref 8.9–12.7)
POTASSIUM SERPL-SCNC: 4 MMOL/L (ref 3.5–5.3)
PROT SERPL-MCNC: 7.3 G/DL (ref 6.4–8.2)
PROTHROMBIN TIME: 18.8 SECONDS (ref 11.6–14.5)
RBC # BLD AUTO: 4.13 MILLION/UL (ref 3.88–5.62)
SODIUM SERPL-SCNC: 144 MMOL/L (ref 136–145)
TROPONIN I SERPL-MCNC: 0.04 NG/ML
WBC # BLD AUTO: 11.48 THOUSAND/UL (ref 4.31–10.16)

## 2021-03-15 PROCEDURE — 85730 THROMBOPLASTIN TIME PARTIAL: CPT | Performed by: EMERGENCY MEDICINE

## 2021-03-15 PROCEDURE — 80053 COMPREHEN METABOLIC PANEL: CPT | Performed by: EMERGENCY MEDICINE

## 2021-03-15 PROCEDURE — 93005 ELECTROCARDIOGRAM TRACING: CPT

## 2021-03-15 PROCEDURE — 83036 HEMOGLOBIN GLYCOSYLATED A1C: CPT | Performed by: INTERNAL MEDICINE

## 2021-03-15 PROCEDURE — 99285 EMERGENCY DEPT VISIT HI MDM: CPT

## 2021-03-15 PROCEDURE — 71045 X-RAY EXAM CHEST 1 VIEW: CPT

## 2021-03-15 PROCEDURE — 84132 ASSAY OF SERUM POTASSIUM: CPT

## 2021-03-15 PROCEDURE — 85025 COMPLETE CBC W/AUTO DIFF WBC: CPT | Performed by: EMERGENCY MEDICINE

## 2021-03-15 PROCEDURE — 84295 ASSAY OF SERUM SODIUM: CPT

## 2021-03-15 PROCEDURE — 99285 EMERGENCY DEPT VISIT HI MDM: CPT | Performed by: EMERGENCY MEDICINE

## 2021-03-15 PROCEDURE — 82947 ASSAY GLUCOSE BLOOD QUANT: CPT

## 2021-03-15 PROCEDURE — 83880 ASSAY OF NATRIURETIC PEPTIDE: CPT | Performed by: EMERGENCY MEDICINE

## 2021-03-15 PROCEDURE — 94640 AIRWAY INHALATION TREATMENT: CPT

## 2021-03-15 PROCEDURE — 84484 ASSAY OF TROPONIN QUANT: CPT | Performed by: EMERGENCY MEDICINE

## 2021-03-15 PROCEDURE — 85610 PROTHROMBIN TIME: CPT | Performed by: EMERGENCY MEDICINE

## 2021-03-15 PROCEDURE — 82803 BLOOD GASES ANY COMBINATION: CPT

## 2021-03-15 PROCEDURE — 85014 HEMATOCRIT: CPT

## 2021-03-15 PROCEDURE — 36415 COLL VENOUS BLD VENIPUNCTURE: CPT | Performed by: EMERGENCY MEDICINE

## 2021-03-15 PROCEDURE — 82330 ASSAY OF CALCIUM: CPT

## 2021-03-15 RX ORDER — NITROGLYCERIN 0.4 MG/1
0.4 TABLET SUBLINGUAL ONCE
Status: COMPLETED | OUTPATIENT
Start: 2021-03-15 | End: 2021-03-15

## 2021-03-15 RX ORDER — ASPIRIN 81 MG/1
324 TABLET, CHEWABLE ORAL ONCE
Status: COMPLETED | OUTPATIENT
Start: 2021-03-15 | End: 2021-03-15

## 2021-03-15 RX ORDER — ALBUTEROL SULFATE 2.5 MG/3ML
5 SOLUTION RESPIRATORY (INHALATION) ONCE
Status: COMPLETED | OUTPATIENT
Start: 2021-03-15 | End: 2021-03-15

## 2021-03-15 RX ORDER — FUROSEMIDE 10 MG/ML
40 INJECTION INTRAMUSCULAR; INTRAVENOUS ONCE
Status: COMPLETED | OUTPATIENT
Start: 2021-03-16 | End: 2021-03-16

## 2021-03-15 RX ADMIN — NITROGLYCERIN 0.4 MG: 0.4 TABLET SUBLINGUAL at 23:09

## 2021-03-15 RX ADMIN — ASPIRIN 324 MG: 81 TABLET, CHEWABLE ORAL at 23:08

## 2021-03-15 RX ADMIN — ALBUTEROL SULFATE 5 MG: 2.5 SOLUTION RESPIRATORY (INHALATION) at 23:11

## 2021-03-16 ENCOUNTER — APPOINTMENT (OUTPATIENT)
Dept: NON INVASIVE DIAGNOSTICS | Facility: HOSPITAL | Age: 71
DRG: 291 | End: 2021-03-16
Payer: COMMERCIAL

## 2021-03-16 PROBLEM — D72.829 LEUKOCYTOSIS: Status: ACTIVE | Noted: 2021-03-16

## 2021-03-16 PROBLEM — J44.9 CHRONIC OBSTRUCTIVE PULMONARY DISEASE (COPD) (HCC): Status: ACTIVE | Noted: 2017-04-08

## 2021-03-16 PROBLEM — N18.32 STAGE 3B CHRONIC KIDNEY DISEASE (HCC): Status: ACTIVE | Noted: 2021-03-16

## 2021-03-16 PROBLEM — J96.01 ACUTE RESPIRATORY FAILURE WITH HYPOXIA (HCC): Status: ACTIVE | Noted: 2017-04-08

## 2021-03-16 PROBLEM — G47.33 OBSTRUCTIVE SLEEP APNEA: Status: ACTIVE | Noted: 2021-03-16

## 2021-03-16 PROBLEM — N18.30 STAGE 3 CHRONIC KIDNEY DISEASE (HCC): Status: ACTIVE | Noted: 2021-03-16

## 2021-03-16 PROBLEM — I10 ESSENTIAL HYPERTENSION: Status: ACTIVE | Noted: 2021-03-16

## 2021-03-16 PROBLEM — I50.9 ACUTE ON CHRONIC CONGESTIVE HEART FAILURE (HCC): Status: ACTIVE | Noted: 2021-03-16

## 2021-03-16 LAB
ANION GAP SERPL CALCULATED.3IONS-SCNC: 11 MMOL/L (ref 4–13)
ATRIAL RATE: 78 BPM
BASE EXCESS BLDA CALC-SCNC: 1 MMOL/L (ref -2–3)
BASOPHILS # BLD AUTO: 0.06 THOUSANDS/ΜL (ref 0–0.1)
BASOPHILS NFR BLD AUTO: 1 % (ref 0–1)
BUN SERPL-MCNC: 34 MG/DL (ref 5–25)
CA-I BLD-SCNC: 1.21 MMOL/L (ref 1.12–1.32)
CALCIUM SERPL-MCNC: 8.9 MG/DL (ref 8.3–10.1)
CHLORIDE SERPL-SCNC: 108 MMOL/L (ref 100–108)
CO2 SERPL-SCNC: 27 MMOL/L (ref 21–32)
CREAT SERPL-MCNC: 2.08 MG/DL (ref 0.6–1.3)
EOSINOPHIL # BLD AUTO: 0.07 THOUSAND/ΜL (ref 0–0.61)
EOSINOPHIL NFR BLD AUTO: 1 % (ref 0–6)
ERYTHROCYTE [DISTWIDTH] IN BLOOD BY AUTOMATED COUNT: 16.4 % (ref 11.6–15.1)
EST. AVERAGE GLUCOSE BLD GHB EST-MCNC: 103 MG/DL
GFR SERPL CREATININE-BSD FRML MDRD: 31 ML/MIN/1.73SQ M
GLUCOSE P FAST SERPL-MCNC: 108 MG/DL (ref 65–99)
GLUCOSE SERPL-MCNC: 103 MG/DL (ref 65–140)
GLUCOSE SERPL-MCNC: 108 MG/DL (ref 65–140)
GLUCOSE SERPL-MCNC: 111 MG/DL (ref 65–140)
GLUCOSE SERPL-MCNC: 114 MG/DL (ref 65–140)
GLUCOSE SERPL-MCNC: 122 MG/DL (ref 65–140)
GLUCOSE SERPL-MCNC: 93 MG/DL (ref 65–140)
HBA1C MFR BLD: 5.2 %
HCO3 BLDA-SCNC: 27.3 MMOL/L (ref 24–30)
HCT VFR BLD AUTO: 36.7 % (ref 36.5–49.3)
HCT VFR BLD CALC: 39 % (ref 36.5–49.3)
HGB BLD-MCNC: 11.2 G/DL (ref 12–17)
HGB BLDA-MCNC: 13.3 G/DL (ref 12–17)
IMM GRANULOCYTES # BLD AUTO: 0.03 THOUSAND/UL (ref 0–0.2)
IMM GRANULOCYTES NFR BLD AUTO: 0 % (ref 0–2)
LYMPHOCYTES # BLD AUTO: 1.4 THOUSANDS/ΜL (ref 0.6–4.47)
LYMPHOCYTES NFR BLD AUTO: 15 % (ref 14–44)
MCH RBC QN AUTO: 28.4 PG (ref 26.8–34.3)
MCHC RBC AUTO-ENTMCNC: 30.5 G/DL (ref 31.4–37.4)
MCV RBC AUTO: 93 FL (ref 82–98)
MONOCYTES # BLD AUTO: 0.93 THOUSAND/ΜL (ref 0.17–1.22)
MONOCYTES NFR BLD AUTO: 10 % (ref 4–12)
NEUTROPHILS # BLD AUTO: 7.07 THOUSANDS/ΜL (ref 1.85–7.62)
NEUTS SEG NFR BLD AUTO: 73 % (ref 43–75)
NRBC BLD AUTO-RTO: 0 /100 WBCS
P AXIS: 54 DEGREES
PCO2 BLD: 29 MMOL/L (ref 21–32)
PCO2 BLD: 51.3 MM HG (ref 42–50)
PH BLD: 7.33 [PH] (ref 7.3–7.4)
PLATELET # BLD AUTO: 211 THOUSANDS/UL (ref 149–390)
PMV BLD AUTO: 10.1 FL (ref 8.9–12.7)
PO2 BLD: 40 MM HG (ref 35–45)
POTASSIUM BLD-SCNC: 3.9 MMOL/L (ref 3.5–5.3)
POTASSIUM SERPL-SCNC: 3.6 MMOL/L (ref 3.5–5.3)
PROCALCITONIN SERPL-MCNC: 0.05 NG/ML
QRS AXIS: 202 DEGREES
QRSD INTERVAL: 96 MS
QT INTERVAL: 330 MS
QTC INTERVAL: 368 MS
RBC # BLD AUTO: 3.94 MILLION/UL (ref 3.88–5.62)
SAO2 % BLD FROM PO2: 70 % (ref 60–85)
SODIUM BLD-SCNC: 143 MMOL/L (ref 136–145)
SODIUM SERPL-SCNC: 146 MMOL/L (ref 136–145)
SPECIMEN SOURCE: ABNORMAL
T WAVE AXIS: 12 DEGREES
TROPONIN I SERPL-MCNC: 0.04 NG/ML
VENTRICULAR RATE: 75 BPM
WBC # BLD AUTO: 9.56 THOUSAND/UL (ref 4.31–10.16)

## 2021-03-16 PROCEDURE — 93306 TTE W/DOPPLER COMPLETE: CPT | Performed by: INTERNAL MEDICINE

## 2021-03-16 PROCEDURE — 84484 ASSAY OF TROPONIN QUANT: CPT | Performed by: INTERNAL MEDICINE

## 2021-03-16 PROCEDURE — 82948 REAGENT STRIP/BLOOD GLUCOSE: CPT

## 2021-03-16 PROCEDURE — 97163 PT EVAL HIGH COMPLEX 45 MIN: CPT

## 2021-03-16 PROCEDURE — 97116 GAIT TRAINING THERAPY: CPT

## 2021-03-16 PROCEDURE — 93306 TTE W/DOPPLER COMPLETE: CPT

## 2021-03-16 PROCEDURE — 97167 OT EVAL HIGH COMPLEX 60 MIN: CPT

## 2021-03-16 PROCEDURE — 80048 BASIC METABOLIC PNL TOTAL CA: CPT | Performed by: INTERNAL MEDICINE

## 2021-03-16 PROCEDURE — 84145 PROCALCITONIN (PCT): CPT | Performed by: INTERNAL MEDICINE

## 2021-03-16 PROCEDURE — 93010 ELECTROCARDIOGRAM REPORT: CPT | Performed by: INTERNAL MEDICINE

## 2021-03-16 PROCEDURE — 99222 1ST HOSP IP/OBS MODERATE 55: CPT | Performed by: INTERNAL MEDICINE

## 2021-03-16 PROCEDURE — 94002 VENT MGMT INPAT INIT DAY: CPT

## 2021-03-16 PROCEDURE — 85025 COMPLETE CBC W/AUTO DIFF WBC: CPT | Performed by: INTERNAL MEDICINE

## 2021-03-16 PROCEDURE — 94760 N-INVAS EAR/PLS OXIMETRY 1: CPT

## 2021-03-16 RX ORDER — ALBUTEROL SULFATE 2.5 MG/3ML
2.5 SOLUTION RESPIRATORY (INHALATION) EVERY 6 HOURS PRN
Status: DISCONTINUED | OUTPATIENT
Start: 2021-03-16 | End: 2021-03-17 | Stop reason: HOSPADM

## 2021-03-16 RX ORDER — FOLIC ACID 1 MG/1
1 TABLET ORAL DAILY
Status: DISCONTINUED | OUTPATIENT
Start: 2021-03-16 | End: 2021-03-17 | Stop reason: HOSPADM

## 2021-03-16 RX ORDER — LOSARTAN POTASSIUM 50 MG/1
100 TABLET ORAL DAILY
Status: DISCONTINUED | OUTPATIENT
Start: 2021-03-16 | End: 2021-03-17 | Stop reason: HOSPADM

## 2021-03-16 RX ORDER — FUROSEMIDE 10 MG/ML
40 INJECTION INTRAMUSCULAR; INTRAVENOUS
Status: DISCONTINUED | OUTPATIENT
Start: 2021-03-16 | End: 2021-03-17

## 2021-03-16 RX ORDER — AMLODIPINE BESYLATE 5 MG/1
5 TABLET ORAL DAILY
Status: DISCONTINUED | OUTPATIENT
Start: 2021-03-16 | End: 2021-03-17 | Stop reason: HOSPADM

## 2021-03-16 RX ORDER — TAMSULOSIN HYDROCHLORIDE 0.4 MG/1
0.8 CAPSULE ORAL
Status: DISCONTINUED | OUTPATIENT
Start: 2021-03-16 | End: 2021-03-17 | Stop reason: HOSPADM

## 2021-03-16 RX ORDER — POTASSIUM CHLORIDE 20 MEQ/1
20 TABLET, EXTENDED RELEASE ORAL DAILY
Status: DISCONTINUED | OUTPATIENT
Start: 2021-03-16 | End: 2021-03-17 | Stop reason: HOSPADM

## 2021-03-16 RX ORDER — ASPIRIN 81 MG/1
81 TABLET ORAL DAILY
Status: DISCONTINUED | OUTPATIENT
Start: 2021-03-16 | End: 2021-03-17 | Stop reason: HOSPADM

## 2021-03-16 RX ORDER — ALLOPURINOL 100 MG/1
100 TABLET ORAL DAILY
Status: DISCONTINUED | OUTPATIENT
Start: 2021-03-16 | End: 2021-03-17 | Stop reason: HOSPADM

## 2021-03-16 RX ORDER — DABIGATRAN ETEXILATE 150 MG/1
150 CAPSULE, COATED PELLETS ORAL 2 TIMES DAILY
Status: DISCONTINUED | OUTPATIENT
Start: 2021-03-16 | End: 2021-03-17 | Stop reason: HOSPADM

## 2021-03-16 RX ORDER — BUDESONIDE AND FORMOTEROL FUMARATE DIHYDRATE 160; 4.5 UG/1; UG/1
2 AEROSOL RESPIRATORY (INHALATION) 2 TIMES DAILY
Status: DISCONTINUED | OUTPATIENT
Start: 2021-03-16 | End: 2021-03-17 | Stop reason: HOSPADM

## 2021-03-16 RX ORDER — ACETAMINOPHEN 325 MG/1
650 TABLET ORAL EVERY 6 HOURS PRN
Status: DISCONTINUED | OUTPATIENT
Start: 2021-03-16 | End: 2021-03-17 | Stop reason: HOSPADM

## 2021-03-16 RX ORDER — TORSEMIDE 20 MG/1
40 TABLET ORAL DAILY
COMMUNITY
End: 2021-11-03 | Stop reason: HOSPADM

## 2021-03-16 RX ORDER — GUAIFENESIN 600 MG
600 TABLET, EXTENDED RELEASE 12 HR ORAL EVERY 12 HOURS SCHEDULED
Status: DISCONTINUED | OUTPATIENT
Start: 2021-03-16 | End: 2021-03-17 | Stop reason: HOSPADM

## 2021-03-16 RX ORDER — PRAVASTATIN SODIUM 20 MG
20 TABLET ORAL
Status: DISCONTINUED | OUTPATIENT
Start: 2021-03-16 | End: 2021-03-17 | Stop reason: HOSPADM

## 2021-03-16 RX ADMIN — BUDESONIDE AND FORMOTEROL FUMARATE DIHYDRATE 2 PUFF: 160; 4.5 AEROSOL RESPIRATORY (INHALATION) at 08:35

## 2021-03-16 RX ADMIN — ALLOPURINOL 100 MG: 100 TABLET ORAL at 08:34

## 2021-03-16 RX ADMIN — LOSARTAN POTASSIUM 100 MG: 50 TABLET, FILM COATED ORAL at 08:34

## 2021-03-16 RX ADMIN — GUAIFENESIN 600 MG: 600 TABLET ORAL at 20:06

## 2021-03-16 RX ADMIN — BUDESONIDE AND FORMOTEROL FUMARATE DIHYDRATE 2 PUFF: 160; 4.5 AEROSOL RESPIRATORY (INHALATION) at 17:11

## 2021-03-16 RX ADMIN — FUROSEMIDE 40 MG: 10 INJECTION, SOLUTION INTRAVENOUS at 17:11

## 2021-03-16 RX ADMIN — FUROSEMIDE 40 MG: 10 INJECTION, SOLUTION INTRAVENOUS at 08:34

## 2021-03-16 RX ADMIN — ASPIRIN 81 MG: 81 TABLET, COATED ORAL at 08:34

## 2021-03-16 RX ADMIN — GUAIFENESIN 600 MG: 600 TABLET ORAL at 08:37

## 2021-03-16 RX ADMIN — POTASSIUM CHLORIDE 20 MEQ: 1500 TABLET, EXTENDED RELEASE ORAL at 08:34

## 2021-03-16 RX ADMIN — GUAIFENESIN 600 MG: 600 TABLET ORAL at 02:00

## 2021-03-16 RX ADMIN — PRAVASTATIN SODIUM 20 MG: 20 TABLET ORAL at 17:11

## 2021-03-16 RX ADMIN — FOLIC ACID 1 MG: 1 TABLET ORAL at 08:34

## 2021-03-16 RX ADMIN — TAMSULOSIN HYDROCHLORIDE 0.8 MG: 0.4 CAPSULE ORAL at 17:11

## 2021-03-16 RX ADMIN — AMLODIPINE BESYLATE 5 MG: 5 TABLET ORAL at 08:34

## 2021-03-16 RX ADMIN — FUROSEMIDE 40 MG: 10 INJECTION, SOLUTION INTRAMUSCULAR; INTRAVENOUS at 00:20

## 2021-03-16 RX ADMIN — DABIGATRAN ETEXILATE MESYLATE 150 MG: 150 CAPSULE ORAL at 17:11

## 2021-03-16 RX ADMIN — DABIGATRAN ETEXILATE MESYLATE 150 MG: 150 CAPSULE ORAL at 08:34

## 2021-03-16 NOTE — ASSESSMENT & PLAN NOTE
· Continue Norvasc 5 mg daily, losartan 100 mg daily  · Blood pressure in the /78  · Continue to monitor

## 2021-03-16 NOTE — ASSESSMENT & PLAN NOTE
No results found for: HGBA1C    No results for input(s): POCGLU in the last 72 hours      Blood Sugar Average: Last 72 hrs:    · Hold home glipizide 15 mg b i d   · Start sliding scale insulin  · Order A1c

## 2021-03-16 NOTE — H&P
Bryon Lanier  H&P- Nancy Purpura 1950, 65 y o  male MRN: [id number]  Unit/Bed#: -01 Encounter: [id number]  Primary Care Provider: Yecenia Taveras MD   Date and time admitted to hospital: 3/15/2021 10:37 PM    * Acute respiratory failure with hypoxia ([address] )  Assessment & Plan  · Came in due to increasing shortness of breath with congestion that is worse on exertion x1 week   · In the ED placed on 2 L nasal cannula 96%   · Wean oxygen as possible       Acute on chronic congestive heart failure (HCC)  Assessment & Plan  Wt Readings from Last 3 Encounters:   03/15/21 118 kg (260 lb)   02/26/18 134 kg (294 lb 9 6 oz)   04/12/17 133 kg (293 lb 14 oz)     · Came in due to worsening shortness of breath over the past week that is worse with exertion  Patient also with nasal congestion  Denies fevers or chills  · Acute on chronic vs chronic? ??  · 90% on room air, placed on 2 L nasal cannula at 96%  · BNP 3579 on admission (Was 1,620 in 2017)  · Possibly chronically elevated in the 3000s now  · Last echo December 2019:  EF 50%  · Repeat echo  · At home takes torsemide 20 mg b i d   · Hold home torsemide  In the ED given 40 mg IV Lasix    Continue 40 mg IV Lasix b i d   · Monitor I/Os and daily weights  · Place on diabetic/sodium 2 g/fluid restriction 1500  · Placed on telemetry        Leukocytosis  Assessment & Plan  · White blood cell 11 48 on admission   · Patient afebrile, denies fevers or chills at home  · Patient with shortness of breath and congestion, does not appear bacterial  · Hold off on starting antibiotics at this time  · Continue to monitor    Stage 3b chronic kidney disease  Assessment & Plan  Lab Results   Component Value Date    EGFR 31 03/15/2021    EGFR 42 6 04/12/2017    EGFR 36 3 04/11/2017    CREATININE 2 09 (H) 03/15/2021    CREATININE 1 62 (H) 04/12/2017    CREATININE 1 86 (H) 04/11/2017   · Creatinine 2 09 on admission  · Baseline about 1 5-2 0 on labs from 2017  · Continue to monitor    Obstructive sleep apnea  Assessment & Plan  · Wears CPAP machine at home   · Order CPAP HS    Essential hypertension  Assessment & Plan  · Continue Norvasc 5 mg daily, losartan 100 mg daily  · Blood pressure in the /78  · Continue to monitor    Type 2 diabetes mellitus, without long-term current use of insulin (HCC)  Assessment & Plan  No results found for: HGBA1C    No results for input(s): POCGLU in the last 72 hours  Blood Sugar Average: Last 72 hrs:    · Hold home glipizide 15 mg b i d   · Start sliding scale insulin  · Order A1c    Paroxysmal atrial fibrillation (HCC)  Assessment & Plan  · History of atrial fibrillation   · Not currently in Afib   · Placed on telemetry   · Continue to monitor     Chronic obstructive pulmonary disease (COPD) ([facility])  Assessment & Plan  · Continue Symbicort b i d  and albuterol p r n  · Patient does not wear oxygen at baseline, currently on 2 L  · Does not appear to be in COPD exacerbation    VTE Prophylaxis: Dabigatran (Pradaxa)  / sequential compression device   Code Status:  Level 1 full code  POLST: There is no POLST form on file for this patient (pre-hospital)  Discussion with family:  Spoke to wife at bedside    Anticipated Length of Stay:  Patient will be admitted on an Observation basis with an anticipated length of stay of  < 2 midnights  Justification for Hospital Stay:  Acute respiratory failure    Total Time for Visit, including Counseling / Coordination of Care: 1 hour  Greater than 50% of this total time spent on direct patient counseling and coordination of care  Chief Complaint:   Shortness of breath    History of Present Illness:    Agueda Henriquez is a 65 y o  male with past medical history of CHF, COPD, obstructive sleep apnea, CKD, AFib who presents with increasing shortness of breath and congestion for about 1 week    Patient reports that shortness of breath is worse with exertion especially when using the stairs  Patient initially presented at 90% on room air, was placed on 2 L nasal cannula now at 96%  Patient denies swelling of his legs and does not appear to be fluid overload  Patient reports compliance with all home medications  Typically goes to the [place] for most of his medical treatment  Patient denies chest pain, abdominal pain, nausea, vomiting, diarrhea, fever, or chills  Review of Systems:    Review of Systems   Constitutional: Negative for fatigue and fever  HENT: Positive for congestion  Negative for sore throat  Respiratory: Positive for shortness of breath  Negative for cough and chest tightness  Cardiovascular: Negative for chest pain  Gastrointestinal: Negative for abdominal distention, abdominal pain, diarrhea, nausea and vomiting  Genitourinary: Negative for difficulty urinating  Musculoskeletal: Negative for arthralgias  Neurological: Negative for weakness and headaches  Psychiatric/Behavioral: Negative for agitation and behavioral problems  All other systems reviewed and are negative  Past Medical and Surgical History:     Past Medical History:   Diagnosis Date    [facility]-fib [facility])     [facility]congestive [facility] failure) ([facility] )     COPD (chronic obstructive pulmonary disease) ([place] [address] )     Diabetes mellitus ([place] [address] )     Enlarged prostate     Gout     Hyperlipidemia     Hypertension        Past Surgical History:   Procedure Laterality Date    EAR SURGERY      FRACTURE SURGERY         Meds/Allergies:    Prior to Admission medications    Medication Sig Start Date End Date Taking?  Authorizing Provider   torsemide (DEMADEX) 20 mg tablet Take 40 mg by mouth daily   Yes Historical Provider, MD   allopurinol (ZYLOPRIM) 100 mg tablet Take 100 mg by mouth daily    Historical Provider, MD   amLODIPine (NORVASC) 10 mg tablet Take 5 mg by mouth daily     Historical Provider, MD   budesonide-formoterol (SYMBICORT) 160-4 5 mcg/act inhaler Inhale 2 puffs 2 (two) times a day Historical Provider, MD   folic acid (FOLVITE) 1 mg tablet Take 1 mg by mouth daily    Historical Provider, MD   glipiZIDE (GLUCOTROL) 10 mg tablet Take 15 mg by mouth 2 (two) times a day before meals    Historical Provider, MD   losartan (COZAAR) 50 mg tablet Take 100 mg by mouth daily     Historical Provider, MD   multivitamin (THERAGRAN) TABS Take 1 tablet by mouth daily    Historical Provider, MD   niacin (NIASPAN) 500 mg CR tablet Take 500 mg by mouth daily at bedtime    Historical Provider, MD   Nutritional Supplements (VITAMIN D BOOSTER PO) Take 2,000 Units by mouth    Historical Provider, MD   potassium chloride (K-DUR,KLOR-CON) 20 mEq tablet Take 20 mEq by mouth daily    Historical Provider, MD   PRADAXA 150 MG capsu  11/27/17   Historical Provider, MD   simvastatin (ZOCOR) 10 mg tablet Take 10 mg by mouth daily at bedtime    Historical Provider, MD   tamsulosin (FLOMAX) 0 4 mg Take 0 8 mg by mouth daily with dinner    Historical Provider, MD   furosemide (LASIX) 40 mg tablet  2/16/18 3/16/21  Historical Provider, MD     I have reviewed home medications with patient personally  Allergies: Allergies   Allergen Reactions    Bee Venom        Social History:     Marital Status: /Civil Union   Occupation:  Retired  Patient Pre-hospital Living Situation:  Home  Patient Pre-hospital Level of Mobility:  Limited  Patient Pre-hospital Diet Restrictions:  Cardiac/diabetic  Substance Use History:   Social History     Substance and Sexual Activity   Alcohol Use Yes    Comment: occassional     Social History     Tobacco Use   Smoking Status Former Smoker   Smokeless Tobacco Never Used     Social History     Substance and Sexual Activity   Drug Use No       Family History:    No family history on file      Physical Exam:     Vitals:   Blood Pressure: 140/83 (03/16/21 0133)  Pulse: 73 (03/16/21 0133)  Temperature: 98 5 °F (36 9 °C) (03/16/21 0133)  Respirations: 16 (03/16/21 0030)  Weight - Scale: 118 kg (260 lb) (03/15/21 2238)  SpO2: 96 % (03/16/21 0133)    Physical Exam  Vitals signs and nursing note reviewed  Constitutional:       Appearance: Normal appearance  HENT:      Head: Normocephalic  Nose: Congestion present  Eyes:      Extraocular Movements: Extraocular movements intact  Pupils: Pupils are equal, round, and reactive to light  Neck:      Musculoskeletal: Normal range of motion  Cardiovascular:      Rate and Rhythm: Normal rate and regular rhythm  Heart sounds: No murmur  Pulmonary:      Effort: Pulmonary effort is normal  No respiratory distress  Breath sounds: No wheezing  Comments: Decreased breath sounds bilaterally  Abdominal:      General: Bowel sounds are normal  There is no distension  Tenderness: There is no abdominal tenderness  There is no guarding  Musculoskeletal: Normal range of motion  Right lower leg: No edema  Left lower leg: No edema  Skin:     General: Skin is warm  Neurological:      General: No focal deficit present  Mental Status: He is alert and oriented to person, place, and time  Psychiatric:         Mood and Affect: Mood normal          Behavior: Behavior normal          Thought Content: Thought content normal          Additional Data:     Lab Results: I have personally reviewed pertinent reports        Results from last 7 days   Lab Units 03/15/21  2253   WBC Thousand/uL 11 48*   HEMOGLOBIN g/dL 11 8*   HEMATOCRIT % 38 0   PLATELETS Thousands/uL 243   NEUTROS PCT % 65   LYMPHS PCT % 24   MONOS PCT % 8   EOS PCT % 3     Results from last 7 days   Lab Units 03/15/21  2253   SODIUM mmol/L 144   POTASSIUM mmol/L 4 0   CHLORIDE mmol/L 107   CO2 mmol/L 27   BUN mg/dL 35*   CREATININE mg/dL 2 09*   ANION GAP mmol/L 10   CALCIUM mg/dL 8 7   ALBUMIN g/dL 3 7   TOTAL BILIRUBIN mg/dL 0 50   ALK PHOS U/L 71   ALT U/L 31   AST U/L 27   GLUCOSE RANDOM mg/dL 112     Results from last 7 days   Lab Units 03/15/21  2253   INR  1 58* Imaging: I have personally reviewed pertinent reports  XR chest portable    (Results Pending)       Allscripts / Epic Records Reviewed: Yes     ** Please Note: This note has been constructed using a voice recognition system   **

## 2021-03-16 NOTE — ASSESSMENT & PLAN NOTE
Lab Results   Component Value Date    EGFR 31 03/15/2021    EGFR 42 6 04/12/2017    EGFR 36 3 04/11/2017    CREATININE 2 09 (H) 03/15/2021    CREATININE 1 62 (H) 04/12/2017    CREATININE 1 86 (H) 04/11/2017   · Creatinine 2 09 on admission  · Baseline about 1 5-2 0 on labs from 2017  · Continue to monitor

## 2021-03-16 NOTE — PHYSICAL THERAPY NOTE
PHYSICAL THERAPY EVAL       03/16/21 1055   PT Last Visit   PT Visit Date 03/16/21   Note Type   Note type Evaluation   Pain Assessment   Pain Assessment Tool 0-10   Pain Score No Pain   Home Living   Type of [address] [facility] Two level;1/2 bath on main level  (1STE  17 steps to the second floor)   Prior Function   Level of [place] Independent with ADLs and functional mobility; Needs assistance with IADLs   Lives With Spouse   Receives Help From Family   ADL Assistance Independent   IADLs Needs assistance   Falls in the last 6 months 0   Restrictions/Precautions   Weight Bearing Precautions Per Order No   Other Precautions Fluid restriction;Telemetry   General   Family/Caregiver Present No   Cognition   Overall Cognitive Status WFL   Arousal/Participation Alert   Orientation Level Oriented X4   Memory Within functional limits   Following Commands Follows all commands and directions without difficulty   RLE Assessment   RLE Assessment WFL   LLE Assessment   LLE Assessment WFL   Light Touch   RLE Light Touch Grossly intact   LLE Light Touch Grossly intact   Bed Mobility   Supine to Sit 7  Independent   Additional Comments Sat edge of bed approx 7 minutes independently  Transfers   Sit to Stand 6  Modified independent   Additional items Armrests   Stand to Sit 6  Modified independent   Additional items Armrests   Balance   Static Sitting Normal   Dynamic Sitting Normal   Static Standing Normal   Dynamic Standing Normal   Endurance Deficit   Endurance Deficit No   Activity Tolerance   Activity Tolerance Patient tolerated treatment well   Medical Staff Made Aware BRITNEY Menjivar   Nurse Made Aware RN Nikolay   Assessment   Prognosis Good   Problem List Obesity   Assessment Patient presented with acute SOB  Found to have a CHF exacerbation  PMH significant for A-fib,CHF, COPD, HTN, pacer   [facility] [address] with [facility] to enter and steps to the second floor  Does not use an AD and is ind  Current medical status includes telemetry, obesity, SOB  Patient performed bed mobility and transfers independently  Sat edge of bed without support independently  Anticipate discharge home without services  The patient's AM-PAC Basic Mobility Inpatient Short Form Raw Score is 24, Standardized Score is 57 68  A standardized score greater than 42 9 suggests the patient may benefit from discharge to home  Please also refer to the recommendation of the Physical Therapist for safe discharge planning  Barriers to Discharge None   Goals   Patient Goals To go home   PT Treatment Day 0   Plan   Treatment/Interventions   (D/C P T )   Recommendation   PT Discharge Recommendation Return to previous environment with no needs   PT - OK to Discharge Yes   AM-PAC Basic Mobility Inpatient   Turning in Bed Without Bedrails 4   Lying on Back to Sitting on Edge of Flat Bed 4   Moving Bed to Chair 4   Standing Up From Chair 4   Walk in Room 4   Climb 3-5 Stairs 4   Basic Mobility Inpatient Raw Score 24   Basic Mobility Standardized Score 57 68   Verena Sahu, PT            Patient Name: Sandy DE LA CRUZ Date: 3/16/2021       Physical therapy TX:   S: " I am feeling much better "  O: Patient ambulated 125ft without an AD independently  Wide base of support and mildly flexed posture  No sway or LOB observed  Stand to sit mod I    A: Patient is mildly limited by SOB  Otherwise he is at his functional baseline  No strength or balance deficits observed  P: Patient is OK for discharge to home without services  Will D/C P  T  orders  Trushona Sahu, PT

## 2021-03-16 NOTE — ASSESSMENT & PLAN NOTE
Wt Readings from Last 3 Encounters:   03/15/21 118 kg (260 lb)   02/26/18 134 kg (294 lb 9 6 oz)   04/12/17 133 kg (293 lb 14 oz)     · Came in due to worsening shortness of breath over the past week that is worse with exertion  Patient also with nasal congestion  Denies fevers or chills  · Acute on chronic vs chronic? ??  · 90% on room air, placed on 2 L nasal cannula at 96%  · BNP 3579 on admission (Was 1,620 in 2017)  · Possibly chronically elevated in the 3000s now  · Last echo December 2019:  EF 50%  · Repeat echo  · At home takes torsemide 20 mg b i d   · Hold home torsemide  In the ED given 40 mg IV Lasix    Continue 40 mg IV Lasix b i d   · Monitor I/Os and daily weights  · Place on diabetic/sodium 2 g/fluid restriction 1500  · Placed on telemetry

## 2021-03-16 NOTE — ASSESSMENT & PLAN NOTE
· Continue Symbicort b i d  and albuterol p r n    · Patient does not wear oxygen at baseline, currently on 2 L  · Does not appear to be in COPD exacerbation

## 2021-03-16 NOTE — ASSESSMENT & PLAN NOTE
· White blood cell 11 48 on admission   · Patient afebrile, denies fevers or chills at home  · Patient with shortness of breath and congestion, does not appear bacterial  · Hold off on starting antibiotics at this time  · Continue to monitor

## 2021-03-16 NOTE — PROGRESS NOTES
Pastoral Care Progress Note    3/16/2021  Patient: Stephan Patterson : 1950  Admission Date & Time: 3/15/2021 2237  MRN: [id number] CSN: [id number]                     Chaplaincy Interventions Utilized:   Empowerment: Clarified, confirmed, or reviewed information from treatment team     Exploration: Explored spiritual needs & resources        Relationship Building: Cultivated a relationship of care and support    Ritual: Provided prayer    Patient requested prayer        Chaplaincy Outcomes Achieved:  Expressed gratitude    Patient was appreciative of the visit, reuqested prayer for comfort and healing    Ambika Quinones said a prayer at bed      Spiritual Coping Strategies Utilized:   Connectedness       21 1100   Clinical Encounter Type   Visited With Patient   Routine Visit Introduction   Referral From Other (Comment)  (patient daily census)   Yarsanism Encounters   Yarsanism Needs Prayer   Patient Spiritual Encounters   Fear Level 5   Feelings of Loneliness   (none noted)   Feelings of Hopelessness   (none noted)   Coping 5   Social Interaction 100% of the time

## 2021-03-16 NOTE — ED PROVIDER NOTES
History  Chief Complaint   Patient presents with    Shortness of Breath     pt has been congested all week and has been sob all day today  pt tried going to be and he became more sob     This is a 58-year-old male with history of AFib, CHF, COPD hypertension, hyperlipidemia, pacemaker presents for evaluation of acute onset shortness of breath after going up the stairs this evening, he also states that he started feeling slight substernal pressure associated with shortness of breath, no nausea or diaphoresis  Does not have any previous cardiac history, he is currently on Pradaxa as well as torsemide 20 mg b i d  And states that his weight has actually been decreasing  He does have some dyspnea on exertion at baseline, he is not on oxygen at home chronically though has been on CPAP at night for his obstructive sleep apnea which he has been using  He did try using his albuterol at home without any relief  On initial arrival patient saturating 90% on room air with mild respiratory distress, tachypnea, lungs with decreased breath sounds no obvious wheezes or rales though the exam is difficult secondary to patient's body habitus  His oxygen level improved to 96 to 98% on 2 L nasal cannula  There is no lower extremity edema  Of note patient does not have any sick contacts, does have mild congestion which he started having after receiving his 1st COVID shot on February 25th  No fevers at home            Prior to Admission Medications   Prescriptions Last Dose Informant Patient Reported? Taking?    Nutritional Supplements (VITAMIN D BOOSTER PO)   Yes No   Sig: Take 2,000 Units by mouth   PRADAXA 150 MG capsu   Yes No   allopurinol (ZYLOPRIM) 100 mg tablet   Yes No   Sig: Take 100 mg by mouth daily   amLODIPine (NORVASC) 10 mg tablet   Yes No   Sig: Take 10 mg by mouth daily   budesonide-formoterol (SYMBICORT) 160-4 5 mcg/act inhaler   Yes No   Sig: Inhale 2 puffs 2 (two) times a day   folic acid (FOLVITE) 1 mg tablet   Yes No   Sig: Take 1 mg by mouth daily   furosemide (LASIX) 40 mg tablet   Yes No   glipiZIDE (GLUCOTROL) 10 mg tablet   Yes No   Sig: Take 15 mg by mouth 2 (two) times a day before meals   losartan (COZAAR) 50 mg tablet   Yes No   Sig: Take 75 mg by mouth daily   multivitamin (THERAGRAN) TABS   Yes No   Sig: Take 1 tablet by mouth daily   niacin (NIASPAN) 500 mg CR tablet   Yes No   Sig: Take 500 mg by mouth daily at bedtime   potassium chloride (K-DUR,KLOR-CON) 20 mEq tablet   Yes No   Sig: Take 20 mEq by mouth daily   simvastatin (ZOCOR) 10 mg tablet   Yes No   Sig: Take 10 mg by mouth daily at bedtime   tamsulosin (FLOMAX) 0 4 mg   Yes No   Sig: Take 0 8 mg by mouth daily with dinner      Facility-Administered Medications: None       Past Medical History:   Diagnosis Date    [facility]fib ([facility] )     CHF (congestive heart failure) (HCC)     COPD (chronic obstructive pulmonary disease) (HCC)     Diabetes mellitus (HCC)     Enlarged prostate     Gout     Hyperlipidemia     Hypertension        Past Surgical History:   Procedure Laterality Date    EAR SURGERY      FRACTURE SURGERY         No family history on file  I have reviewed and agree with the history as documented  E-Cigarette/Vaping     E-Cigarette/Vaping Substances     Social History     Tobacco Use    Smoking status: Former Smoker    Smokeless tobacco: Never Used   Substance Use Topics    Alcohol use: Yes     Comment: occassional    Drug use: No       Review of Systems   Constitutional: Negative for appetite change, chills, fever and unexpected weight change  HENT: Negative for rhinorrhea and sore throat  Eyes: Negative for photophobia and visual disturbance  Respiratory: Positive for shortness of breath  Negative for cough  Cardiovascular: Positive for chest pain  Negative for palpitations  Gastrointestinal: Negative for abdominal pain and diarrhea  Genitourinary: Negative for dysuria, frequency and urgency     Skin: Negative for rash  Neurological: Negative for dizziness and weakness  All other systems reviewed and are negative  Physical Exam  Physical Exam  Vitals signs and nursing note reviewed  Constitutional:       Appearance: He is well-developed  Comments: Mild respiratory distress with tachypnea, no accessory muscle usage   HENT:      Head: Normocephalic and atraumatic  Right Ear: External ear normal       Left Ear: External ear normal    Eyes:      Conjunctiva/sclera: Conjunctivae normal       Pupils: Pupils are equal, round, and reactive to light  Neck:      Musculoskeletal: Normal range of motion and neck supple  Vascular: No JVD  Trachea: No tracheal deviation  Cardiovascular:      Rate and Rhythm: Normal rate and regular rhythm  Heart sounds: Normal heart sounds  No murmur  No friction rub  No gallop  Pulmonary:      Effort: Pulmonary effort is normal  No respiratory distress  Breath sounds: No stridor  Examination of the right-upper field reveals decreased breath sounds  Examination of the left-upper field reveals decreased breath sounds  Examination of the right-middle field reveals decreased breath sounds  Examination of the left-middle field reveals decreased breath sounds  Examination of the right-lower field reveals decreased breath sounds  Examination of the left-lower field reveals decreased breath sounds  Decreased breath sounds present  No wheezing or rales  Chest:      Comments: Obese nontender abdomen without rebound or guarding  Abdominal:      General: There is no distension  Palpations: Abdomen is soft  There is no mass  Tenderness: There is no abdominal tenderness  There is no guarding or rebound  Musculoskeletal: Normal range of motion  Right lower leg: No edema  Left lower leg: No edema  Skin:     General: Skin is warm and dry  Coloration: Skin is not pale  Findings: No erythema or rash     Neurological:      Mental Status: He is alert and oriented to person, place, and time  Cranial Nerves: No cranial nerve deficit           Vital Signs  ED Triage Vitals   Temperature Pulse Respirations Blood Pressure SpO2   03/15/21 2238 03/15/21 2238 03/15/21 2238 03/15/21 2238 03/15/21 2238   98 1 °F (36 7 °C) 103 20 (!) 198/117 90 %      Temp src Heart Rate Source Patient Position - Orthostatic VS BP Location FiO2 (%)   -- 03/15/21 2238 03/15/21 2238 03/15/21 2238 --    Monitor Sitting Right arm       Pain Score       03/16/21 0002       No Pain           Vitals:    03/15/21 2238 03/15/21 2245   BP: (!) 198/117 (!) 184/116   Pulse: 103 77   Patient Position - Orthostatic VS: Sitting          Visual Acuity      ED Medications  Medications   nitroglycerin (NITROSTAT) SL tablet 0 4 mg (0 4 mg Sublingual Given 3/15/21 2309)   albuterol inhalation solution 5 mg (5 mg Nebulization Given 3/15/21 2311)   aspirin chewable tablet 324 mg (324 mg Oral Given 3/15/21 2308)   furosemide (LASIX) injection 40 mg (40 mg Intravenous Given 3/16/21 0020)       Diagnostic Studies  Results Reviewed     Procedure Component Value Units Date/Time    NT-BNP PRO [[id number]]  (Abnormal) Collected: 03/15/21 2253    Lab Status: Final result Specimen: Blood from Arm, Right Updated: 03/15/21 2332     NT-proBNP 3,579 pg/mL     Troponin I [[id number]]  (Normal) Collected: 03/15/21 2253    Lab Status: Final result Specimen: Blood from Arm, Right Updated: 03/15/21 2327     Troponin I 0 04 ng/mL     Comprehensive metabolic panel [[id number]]  (Abnormal) Collected: 03/15/21 2253    Lab Status: Final result Specimen: Blood from Arm, Right Updated: 03/15/21 2324     Sodium 144 mmol/L      Potassium 4 0 mmol/L      Chloride 107 mmol/L      CO2 27 mmol/L      ANION GAP 10 mmol/L      BUN 35 mg/dL      Creatinine 2 09 mg/dL      Glucose 112 mg/dL      Calcium 8 7 mg/dL      AST 27 U/L      ALT 31 U/L      Alkaline Phosphatase 71 U/L      Total Protein 7 3 g/dL      Albumin 3 7 g/dL Total Bilirubin 0 50 mg/dL      eGFR 31 ml/min/1 73sq m     Narrative:      National Kidney Disease Foundation guidelines for Chronic Kidney Disease (CKD):     Stage 1 with normal or high GFR (GFR > 90 mL/min/1 73 square meters)    Stage 2 Mild CKD (GFR = 60-89 mL/min/1 73 square meters)    Stage 3A Moderate CKD (GFR = 45-59 mL/min/1 73 square meters)    Stage 3B Moderate CKD (GFR = 30-44 mL/min/1 73 square meters)    Stage 4 Severe CKD (GFR = 15-29 mL/min/1 73 square meters)    Stage 5 End Stage CKD (GFR <15 mL/min/1 73 square meters)  Note: GFR calculation is accurate only with a steady state creatinine    Protime-INR [[id number]]  (Abnormal) Collected: 03/15/21 2253    Lab Status: Final result Specimen: Blood from Arm, Right Updated: 03/15/21 2322     Protime 18 8 seconds      INR 1 58    APTT [[id number]]  (Abnormal) Collected: 03/15/21 2253    Lab Status: Final result Specimen: Blood from Arm, Right Updated: 03/15/21 2322     PTT 53 seconds     CBC and differential [[id number]]  (Abnormal) Collected: 03/15/21 2253    Lab Status: Final result Specimen: Blood from Arm, Right Updated: 03/15/21 2307     WBC 11 48 Thousand/uL      RBC 4 13 Million/uL      Hemoglobin 11 8 g/dL      Hematocrit 38 0 %      MCV 92 fL      MCH 28 6 pg      MCHC 31 1 g/dL      RDW 16 8 %      MPV 9 6 fL      Platelets 344 Thousands/uL      Neutrophils Relative 65 %      Immat GRANS % 0 %      Lymphocytes Relative 24 %      Monocytes Relative 8 %      Eosinophils Relative 3 %      Basophils Relative 0 %      Neutrophils Absolute 7 36 Thousands/µL      Immature Grans Absolute 0 04 Thousand/uL      Lymphocytes Absolute 2 76 Thousands/µL      Monocytes Absolute 0 97 Thousand/µL      Eosinophils Absolute 0 31 Thousand/µL      Basophils Absolute 0 04 Thousands/µL                  XR chest portable    (Results Pending)              Procedures  Procedures         ED Course  ED Course as of Mar 16 0025   Mon Mar 15, 2021   2257 Procedure Note: EKG  Date/Time: 03/15/21 10:57 PM   Performed by: Romina Arnold  Authorized by: Romina Arnold  Indications / Diagnosis: CP  ECG reviewed by me, the ED Provider: yes   The EKG demonstrates:  Rhythm: afib, intermittent pacing  Intervals: normal intervals  Axis: normal axis  QRS/Blocks: normal QRS  ST Changes: No acute ST Changes, no STD/CHARLI           2331 Baseline   Creatinine(!): 2 09   2331 Feels better, no acute complaints, chest pain gone, breathing improved                HEART Risk Score      Most Recent Value   Heart Score Risk Calculator   History  1 Filed at: 03/16/2021 0001   ECG  1 Filed at: 03/16/2021 0001   Age  2 Filed at: 03/16/2021 0001   Risk Factors  2 Filed at: 03/16/2021 0001   Troponin  0 Filed at: 03/16/2021 0001   HEART Score  6 Filed at: 03/16/2021 0001                      SBIRT 22yo+      Most Recent Value   SBIRT (23 yo +)   In order to provide better care to our patients, we are screening all of our patients for alcohol and drug use  Would it be okay to ask you these screening questions?   No Filed at: 03/15/2021 2314                    [facility]  Number of Diagnoses or Management Options  Diagnosis management comments: 68-year-old male with a dyspnea on exertion, acute onset shortness of breath, cardiomegaly with mild vascular congestion on chest x-ray, no acute ST changes on EKG, will obtain lab work, will give nitroglycerin, consider diuretic, will admit for further care      Disposition  Final diagnoses:   Chest pain   Acute exacerbation of CHF (congestive heart failure) ([facility] )   Hypoxia     Time reflects when diagnosis was documented in both MDM as applicable and the Disposition within this note     Time User Action Codes Description Comment    3/15/2021 11:58 PM Josetta Slice Add [R07 9] Chest pain     3/15/2021 11:58 PM Josetta Slice Add [I50 9] Acute exacerbation of CHF (congestive heart failure) ([facility] )     3/15/2021 11:59 PM Josetta Slice Add [R09 02] Hypoxia       ED Disposition     ED Disposition Condition Date/Time Comment    Admit Stable Mon Mar 15, 2021 11:58 PM Case was discussed with [place] and the patient's admission status was agreed to be Admission Status: observation status to the service of Dr Cesar Koehler   Follow-up Information    None         Patient's Medications   Discharge Prescriptions    No medications on file     No discharge procedures on file      PDMP Review     None          ED Provider  Electronically Signed by           Pastor Monreal MD  03/16/21 0025

## 2021-03-16 NOTE — ASSESSMENT & PLAN NOTE
· Came in due to increasing shortness of breath with congestion that is worse on exertion x1 week   · In the ED placed on 2 L nasal cannula 96%   · Wean oxygen as possible

## 2021-03-16 NOTE — OCCUPATIONAL THERAPY NOTE
Occupational Therapy Evaluation      [place]    3/16/2021    Principal Problem:    Acute respiratory failure with hypoxia (HCC)  Active Problems:    Chronic obstructive pulmonary disease (COPD) (HCC)    Paroxysmal atrial fibrillation (HCC)    Type 2 diabetes mellitus, without long-term current use of insulin (HCC)    Essential hypertension    Acute on chronic congestive heart failure (HCC)    Stage 3b chronic kidney disease    Leukocytosis    Obstructive sleep apnea      Past Medical History:   Diagnosis Date    A-fib ([place] [address] )     [facility]     COPD (chronic obstructive pulmonary disease) ([place] [address] )     Diabetes mellitus ([place] [address] )     Enlarged prostate     Gout     Hyperlipidemia     Hypertension        Past Surgical History:   Procedure Laterality Date    EAR SURGERY      FRACTURE SURGERY          03/16/21 1051   OT Last Visit   OT Visit Date 03/16/21   Note Type   Note type Evaluation   Restrictions/Precautions   Weight Bearing Precautions Per Order No   Pain Assessment   Pain Assessment Tool 0-10   Pain Score No Pain   Home Living   Type of [address] [place] [address] Two level;Bed/bath upstairs;1/2 bath on main level  (1STE)   Home Equipment   (No DME reported)   Prior Function   Level of [place] Independent with ADLs and functional mobility; Needs assistance with IADLs   Lives With Spouse   ADL Assistance Independent   IADLs Needs assistance  (Wife does IADLs)   Falls in the last 6 months 0   Comments +   Psychosocial   Psychosocial (WDL) WDL   ADL   Eating Assistance 7  Independent   Grooming Assistance 7  Independent   UB Bathing Assistance 7  Independent   LB Bathing Assistance 7  Independent   UB Dressing Assistance 7  Independent   LB Dressing Assistance 7  [address]  7  Independent   Bed Mobility   Supine to Sit 7  Independent   Sit to Supine 7  Independent   Transfers   Sit to Stand 7  Independent   Stand to Sit 7  Independent   Stand pivot 7  Independent   Functional Mobility   Functional Mobility 7  Independent   Activity Tolerance   Activity Tolerance Patient tolerated treatment well   Medical Staff Made Aware PT Rosario   Nurse Made Aware RN Nikolay   RUE Assessment   RUE Assessment WFL   LUE Assessment   LUE Assessment WFL   Cognition   Overall Cognitive Status WFL   Arousal/Participation Alert; Cooperative   Attention Within functional limits   Orientation Level Oriented X4   Memory Within functional limits   Following Commands Follows all commands and directions without difficulty   Assessment   Assessment Pt is a 65 y o  male seen for OT evaluation at [address] [place] [address], admitted 3/15/2021 w/ Acute respiratory failure with hypoxia ([address] )  OT completed extensive review of pt's medical and social history  Comorbidities affecting pt's functional performance at time of assessment include: COPD, respiratory failure, aFib, DM type 2, HTN, CHF, R sided weakness  Prior to admission, pt was living in [place], [place] and was independent with ADL/IADL  Upon evaluation, pt presents to OT at functional baseline  The patient's raw score on the AM-PAC Daily Activity inpatient short form is 24, standardized score is 57 54, greater than 39 4  Patients at this level are likely to benefit from DC to home  Based on findings, pt is of high complexity  At this time, OT recommendations at time of discharge are home with family support  No further acute OT needs indicated at this time - Recommend pt continue to be OOB for meals, ambulation to/from BR, perform self care tasks, and mobility in hallway with nursing  D/C from OT caseload with above recommendations     Plan   OT Frequency Eval only   Recommendation   OT Discharge Recommendation Return to previous environment with social support   OT - OK to Discharge Yes   AM-PAC Daily Activity Inpatient   Lower Body Dressing 4   Bathing 4   Toileting 4   Upper Body Dressing 4   Grooming 4   Eating 4   Daily Activity Raw Score 24   Daily Activity Standardized Score (Calc for Raw Score >=11) 57 54   AM-PAC Applied Cognition Inpatient   Following a Speech/Presentation 4   Understanding Ordinary Conversation 4   Taking Medications 4   Remembering Where Things Are Placed or Put Away 4   Remembering List of 4-5 Errands 4   Taking Care of Complicated Tasks 4   Applied Cognition Raw Score 24   Applied Cognition Standardized Score 62 21     [facility] [employer], MS, OTR/L

## 2021-03-16 NOTE — CASE MANAGEMENT
LOS: 4  Patient is not a 30 day readmission or a Medicare bundled patient  His risk for unplanned readmission is 16, [place]  Met with patient and reviewed the discharge planning process including identifying help at home and patient preference for discharge  Patient reports residing with his wife in a 2 story home with 1 CHARLI  He uses both floors and has a FL on 1st floor  Patient is independent of ADL's and uses no assistive device  He reports he uses [facility] [facility] in [place] for his meds and reports no barriers in obtaining his meds  His PCP is Oh Albright  He transports and his wife prepares meals  He does no have a POA/AD and is not interested in obtaining information  He denies HHC/SNF/MH/D&A rehab admission  Patient plans to return home and anticipates no discharge needs  His family will transport him home

## 2021-03-16 NOTE — ASSESSMENT & PLAN NOTE
· History of atrial fibrillation   · Not currently in Afib   · Placed on telemetry   · Continue to monitor

## 2021-03-16 NOTE — PLAN OF CARE
Problem: Potential for Falls  Goal: Patient will remain free of falls  Description: INTERVENTIONS:  - Assess patient frequently for physical needs  -  Identify cognitive and physical deficits and behaviors that affect risk of falls    -  [place] fall precautions as indicated by assessment   - Educate patient/family on patient safety including physical limitations  - Instruct patient to call for assistance with activity based on assessment  - Modify environment to reduce risk of injury  - Consider OT/PT consult to assist with strengthening/mobility  Outcome: Progressing     Problem: PAIN - ADULT  Goal: Verbalizes/displays adequate comfort level or baseline comfort level  Description: Interventions:  - Encourage patient to monitor pain and request assistance  - Assess pain using appropriate pain scale  - Administer analgesics based on type and severity of pain and evaluate response  - Implement non-pharmacological measures as appropriate and evaluate response  - Consider cultural and social influences on pain and pain management  - Notify physician/advanced practitioner if interventions unsuccessful or patient reports new pain  Outcome: Progressing     Problem: INFECTION - ADULT  Goal: Absence or prevention of progression during hospitalization  Description: INTERVENTIONS:  - Assess and monitor for signs and symptoms of infection  - Monitor lab/diagnostic results  - Monitor all insertion sites, i e  indwelling lines, tubes, and drains  - Monitor endotracheal if appropriate and nasal secretions for changes in amount and color  - [place] appropriate cooling/warming therapies per order  - Administer medications as ordered  - Instruct and encourage patient and family to use good hand hygiene technique  - Identify and instruct in appropriate isolation precautions for identified infection/condition  Outcome: Progressing     Problem: SAFETY ADULT  Goal: Patient will remain free of falls  Description: INTERVENTIONS:  - Assess patient frequently for physical needs  -  Identify cognitive and physical deficits and behaviors that affect risk of falls  -  [place] fall precautions as indicated by assessment   - Educate patient/family on patient safety including physical limitations  - Instruct patient to call for assistance with activity based on assessment  - Modify environment to reduce risk of injury  - Consider OT/PT consult to assist with strengthening/mobility  Outcome: Progressing     Problem: DISCHARGE PLANNING  Goal: Discharge to home or other facility with appropriate resources  Description: INTERVENTIONS:  - Identify barriers to discharge w/patient and caregiver  - Arrange for needed discharge resources and transportation as appropriate  - Identify discharge learning needs (meds, wound care, etc )  - Arrange for interpretive services to assist at discharge as needed  - Refer to Case Management Department for coordinating discharge planning if the patient needs post-hospital services based on physician/advanced practitioner order or complex needs related to functional status, cognitive ability, or social support system  Outcome: Progressing     Problem: Knowledge Deficit  Goal: Patient/family/caregiver demonstrates understanding of disease process, treatment plan, medications, and discharge instructions  Description: Complete learning assessment and assess knowledge base  Interventions:  - Provide teaching at level of understanding  - Provide teaching via preferred learning methods  Outcome: Progressing     Problem: Nutrition/Hydration-ADULT  Goal: Nutrient/Hydration intake appropriate for improving, restoring or maintaining nutritional needs  Description: Monitor and assess patient's nutrition/hydration status for malnutrition  Collaborate with interdisciplinary team and initiate plan and interventions as ordered  Monitor patient's weight and dietary intake as ordered or per policy   Utilize nutrition screening tool and intervene as necessary  Determine patient's food preferences and provide high-protein, high-caloric foods as appropriate       INTERVENTIONS:  - Monitor oral intake, urinary output, labs, and treatment plans  - Assess nutrition and hydration status and recommend course of action  - Evaluate amount of meals eaten  - Assist patient with eating if necessary   - Allow adequate time for meals  - Recommend/ encourage appropriate diets, oral nutritional supplements, and vitamin/mineral supplements  - Order, calculate, and assess calorie counts as needed  - Recommend, monitor, and adjust tube feedings and TPN/PPN based on assessed needs  - Assess need for intravenous fluids  - Provide specific nutrition/hydration education as appropriate  - Include patient/family/caregiver in decisions related to nutrition  Outcome: Progressing     Problem: CARDIOVASCULAR - ADULT  Goal: Maintains optimal cardiac output and hemodynamic stability  Description: INTERVENTIONS:  - Monitor I/O, vital signs and rhythm  - Monitor for S/S and trends of decreased cardiac output  - Administer and titrate ordered vasoactive medications to optimize hemodynamic stability  - Assess quality of pulses, skin color and temperature  - Assess for signs of decreased coronary artery perfusion  - Instruct patient to report change in severity of symptoms  Outcome: Progressing  Goal: Absence of cardiac dysrhythmias or at baseline rhythm  Description: INTERVENTIONS:  - Continuous cardiac monitoring, vital signs, obtain 12 lead EKG if ordered  - Administer antiarrhythmic and heart rate control medications as ordered  - Monitor electrolytes and administer replacement therapy as ordered  Outcome: Progressing     Problem: RESPIRATORY - ADULT  Goal: Achieves optimal ventilation and oxygenation  Description: INTERVENTIONS:  - Assess for changes in respiratory status  - Assess for changes in mentation and behavior  - Position to facilitate oxygenation and minimize respiratory effort  - Oxygen administered by appropriate delivery if ordered  - Initiate smoking cessation education as indicated  - Encourage broncho-pulmonary hygiene including cough, deep breathe, Incentive Spirometry  - Assess the need for suctioning and aspirate as needed  - Assess and instruct to report SOB or any respiratory difficulty  - Respiratory Therapy support as indicated  Outcome: Progressing     Problem: METABOLIC, FLUID AND ELECTROLYTES - ADULT  Goal: Electrolytes maintained within normal limits  Description: INTERVENTIONS:  - Monitor labs and assess patient for signs and symptoms of electrolyte imbalances  - Administer electrolyte replacement as ordered  - Monitor response to electrolyte replacements, including repeat lab results as appropriate  - Instruct patient on fluid and nutrition as appropriate  Outcome: Progressing  Goal: Fluid balance maintained  Description: INTERVENTIONS:  - Monitor labs   - Monitor I/O and WT  - Instruct patient on fluid and nutrition as appropriate  - Assess for signs & symptoms of volume excess or deficit  Outcome: Progressing  Goal: Glucose maintained within target range  Description: INTERVENTIONS:  - Monitor Blood Glucose as ordered  - Assess for signs and symptoms of hyperglycemia and hypoglycemia  - Administer ordered medications to maintain glucose within target range  - Assess nutritional intake and initiate nutrition service referral as needed  Outcome: Progressing     Problem: SKIN/TISSUE INTEGRITY - ADULT  Goal: Skin integrity remains intact  Description: INTERVENTIONS  - Identify patients at risk for skin breakdown  - Assess and monitor skin integrity  - Assess and monitor nutrition and hydration status  - Monitor labs (i e  albumin)  - Assess for incontinence   - Turn and reposition patient  - Assist with mobility/ambulation  - Relieve pressure over bony prominences  - Avoid friction and shearing  - Provide appropriate hygiene as needed including keeping skin clean and dry  - Evaluate need for skin moisturizer/barrier cream  - Collaborate with interdisciplinary team (i e  Nutrition, Rehabilitation, etc )   - Patient/family teaching  Outcome: Progressing     Problem: HEMATOLOGIC - ADULT  Goal: Maintains hematologic stability  Description: INTERVENTIONS  - Assess for signs and symptoms of bleeding or hemorrhage  - Monitor labs  - Administer supportive blood products/factors as ordered and appropriate  Outcome: Progressing     Problem: MUSCULOSKELETAL - ADULT  Goal: Maintain or return mobility to safest level of function  Description: INTERVENTIONS:  - Assess patient's ability to carry out ADLs; assess patient's baseline for ADL function and identify physical deficits which impact ability to perform ADLs (bathing, care of mouth/teeth, toileting, grooming, dressing, etc )  - Assess/evaluate cause of self-care deficits   - Assess range of motion  - Assess patient's mobility  - Assess patient's need for assistive devices and provide as appropriate  - Encourage maximum independence but intervene and supervise when necessary  - Involve family in performance of ADLs  - Assess for home care needs following discharge   - Consider OT consult to assist with ADL evaluation and planning for discharge  - Provide patient education as appropriate  Outcome: Progressing

## 2021-03-17 VITALS
HEART RATE: 73 BPM | WEIGHT: 256.2 LBS | SYSTOLIC BLOOD PRESSURE: 143 MMHG | RESPIRATION RATE: 20 BRPM | HEIGHT: 71 IN | OXYGEN SATURATION: 97 % | TEMPERATURE: 98.1 F | BODY MASS INDEX: 35.87 KG/M2 | DIASTOLIC BLOOD PRESSURE: 93 MMHG

## 2021-03-17 PROBLEM — J96.01 ACUTE RESPIRATORY FAILURE WITH HYPOXIA (HCC): Status: RESOLVED | Noted: 2017-04-08 | Resolved: 2021-03-17

## 2021-03-17 PROBLEM — D72.829 LEUKOCYTOSIS: Status: RESOLVED | Noted: 2021-03-16 | Resolved: 2021-03-17

## 2021-03-17 LAB
ANION GAP SERPL CALCULATED.3IONS-SCNC: 9 MMOL/L (ref 4–13)
BASOPHILS # BLD AUTO: 0.06 THOUSANDS/ΜL (ref 0–0.1)
BASOPHILS NFR BLD AUTO: 1 % (ref 0–1)
BUN SERPL-MCNC: 32 MG/DL (ref 5–25)
CALCIUM SERPL-MCNC: 8.8 MG/DL (ref 8.3–10.1)
CHLORIDE SERPL-SCNC: 107 MMOL/L (ref 100–108)
CO2 SERPL-SCNC: 27 MMOL/L (ref 21–32)
CREAT SERPL-MCNC: 1.82 MG/DL (ref 0.6–1.3)
EOSINOPHIL # BLD AUTO: 0.26 THOUSAND/ΜL (ref 0–0.61)
EOSINOPHIL NFR BLD AUTO: 3 % (ref 0–6)
ERYTHROCYTE [DISTWIDTH] IN BLOOD BY AUTOMATED COUNT: 16.5 % (ref 11.6–15.1)
GFR SERPL CREATININE-BSD FRML MDRD: 36 ML/MIN/1.73SQ M
GLUCOSE SERPL-MCNC: 105 MG/DL (ref 65–140)
GLUCOSE SERPL-MCNC: 111 MG/DL (ref 65–140)
GLUCOSE SERPL-MCNC: 138 MG/DL (ref 65–140)
GLUCOSE SERPL-MCNC: 89 MG/DL (ref 65–140)
HCT VFR BLD AUTO: 36.4 % (ref 36.5–49.3)
HGB BLD-MCNC: 11.3 G/DL (ref 12–17)
IMM GRANULOCYTES # BLD AUTO: 0.02 THOUSAND/UL (ref 0–0.2)
IMM GRANULOCYTES NFR BLD AUTO: 0 % (ref 0–2)
LYMPHOCYTES # BLD AUTO: 1.35 THOUSANDS/ΜL (ref 0.6–4.47)
LYMPHOCYTES NFR BLD AUTO: 17 % (ref 14–44)
MAGNESIUM SERPL-MCNC: 2 MG/DL (ref 1.6–2.6)
MCH RBC QN AUTO: 28.6 PG (ref 26.8–34.3)
MCHC RBC AUTO-ENTMCNC: 31 G/DL (ref 31.4–37.4)
MCV RBC AUTO: 92 FL (ref 82–98)
MONOCYTES # BLD AUTO: 0.72 THOUSAND/ΜL (ref 0.17–1.22)
MONOCYTES NFR BLD AUTO: 9 % (ref 4–12)
NEUTROPHILS # BLD AUTO: 5.38 THOUSANDS/ΜL (ref 1.85–7.62)
NEUTS SEG NFR BLD AUTO: 70 % (ref 43–75)
NRBC BLD AUTO-RTO: 0 /100 WBCS
PLATELET # BLD AUTO: 202 THOUSANDS/UL (ref 149–390)
PMV BLD AUTO: 9.8 FL (ref 8.9–12.7)
POTASSIUM SERPL-SCNC: 3.7 MMOL/L (ref 3.5–5.3)
PROCALCITONIN SERPL-MCNC: <0.05 NG/ML
RBC # BLD AUTO: 3.95 MILLION/UL (ref 3.88–5.62)
SODIUM SERPL-SCNC: 143 MMOL/L (ref 136–145)
WBC # BLD AUTO: 7.79 THOUSAND/UL (ref 4.31–10.16)

## 2021-03-17 PROCEDURE — 80048 BASIC METABOLIC PNL TOTAL CA: CPT | Performed by: INTERNAL MEDICINE

## 2021-03-17 PROCEDURE — 99239 HOSP IP/OBS DSCHRG MGMT >30: CPT | Performed by: INTERNAL MEDICINE

## 2021-03-17 PROCEDURE — 84145 PROCALCITONIN (PCT): CPT | Performed by: INTERNAL MEDICINE

## 2021-03-17 PROCEDURE — 85025 COMPLETE CBC W/AUTO DIFF WBC: CPT | Performed by: INTERNAL MEDICINE

## 2021-03-17 PROCEDURE — 82948 REAGENT STRIP/BLOOD GLUCOSE: CPT

## 2021-03-17 PROCEDURE — 83735 ASSAY OF MAGNESIUM: CPT | Performed by: INTERNAL MEDICINE

## 2021-03-17 PROCEDURE — 94660 CPAP INITIATION&MGMT: CPT

## 2021-03-17 RX ORDER — TORSEMIDE 20 MG/1
40 TABLET ORAL DAILY
Status: DISCONTINUED | OUTPATIENT
Start: 2021-03-17 | End: 2021-03-17 | Stop reason: HOSPADM

## 2021-03-17 RX ADMIN — DABIGATRAN ETEXILATE MESYLATE 150 MG: 150 CAPSULE ORAL at 08:27

## 2021-03-17 RX ADMIN — FUROSEMIDE 40 MG: 10 INJECTION, SOLUTION INTRAVENOUS at 08:27

## 2021-03-17 RX ADMIN — ASPIRIN 81 MG: 81 TABLET, COATED ORAL at 08:27

## 2021-03-17 RX ADMIN — ALLOPURINOL 100 MG: 100 TABLET ORAL at 08:28

## 2021-03-17 RX ADMIN — AMLODIPINE BESYLATE 5 MG: 5 TABLET ORAL at 08:28

## 2021-03-17 RX ADMIN — TORSEMIDE 40 MG: 20 TABLET ORAL at 12:20

## 2021-03-17 RX ADMIN — GUAIFENESIN 600 MG: 600 TABLET ORAL at 08:28

## 2021-03-17 RX ADMIN — FOLIC ACID 1 MG: 1 TABLET ORAL at 08:28

## 2021-03-17 RX ADMIN — POTASSIUM CHLORIDE 20 MEQ: 1500 TABLET, EXTENDED RELEASE ORAL at 08:29

## 2021-03-17 RX ADMIN — BUDESONIDE AND FORMOTEROL FUMARATE DIHYDRATE 2 PUFF: 160; 4.5 AEROSOL RESPIRATORY (INHALATION) at 08:29

## 2021-03-17 RX ADMIN — LOSARTAN POTASSIUM 100 MG: 50 TABLET, FILM COATED ORAL at 08:27

## 2021-03-17 NOTE — ASSESSMENT & PLAN NOTE
Wt Readings from Last 3 Encounters:   03/17/21 116 kg (256 lb 3 2 oz)   02/26/18 134 kg (294 lb 9 6 oz)   04/12/17 133 kg (293 lb 14 oz)     · Came in due to worsening shortness of breath over the past week that is worse with exertion  Patient also with nasal congestion  Denies fevers or chills  · Admitted with acute on chronic diastolic heart failure  · 90% on room air, placed on 2 L nasal cannula at 96%  · BNP 3579 on admission (Was 1,620 in 2017)  · Possibly chronically elevated in the 3000s now  · Last echo December 2019:  EF 50%  · Echo showed ejection fraction of 50%  · At home takes torsemide 20 mg b i d   · Hold home torsemide  In the ED given 40 mg IV Lasix  Continue 40 mg IV Lasix b i d   · Monitor I/Os and daily weights  · Place on diabetic/sodium 2 g/fluid restriction 1500  · Patient diuresed well and is on room air  · Patient had pulse ox with ambulation done and did not require home O2    · Patient will be discharged on torsemide 40 mg daily and [facility] up [facility]

## 2021-03-17 NOTE — ASSESSMENT & PLAN NOTE
Lab Results   Component Value Date    HGBA1C 5 2 03/15/2021       Recent Labs     03/16/21  1619 03/16/21  2106 03/17/21  0734 03/17/21  1112   POCGLU 103 138 105 89       Blood Sugar Average: Last 72 hrs:  (P) 108 [id number]  · Hold home glipizide 15 mg b i d   · Start sliding scale insulin

## 2021-03-17 NOTE — ASSESSMENT & PLAN NOTE
· Came in due to increasing shortness of breath with congestion that is worse on exertion x1 week   · In the ED placed on 2 L nasal cannula 96%   · Patient is saturating well on room air  · Patient had pulse ox with ambulation done and did not require home O2

## 2021-03-17 NOTE — ASSESSMENT & PLAN NOTE
· White blood cell 11 48 on admission   · Patient afebrile, denies fevers or chills at home  · Patient with shortness of breath and congestion, does not appear bacterial  · Hold off on starting antibiotics at this time  · Procalcitonin is normal  · Resolved

## 2021-03-17 NOTE — UTILIZATION REVIEW
Initial Clinical Review    Admission: Date/Time/Statement: OBS [email or web address] UPGRADED TO INPT [email or web address] D/T PERSISTENT ACUTE RESP FAILURE WITH HYPOXIA  Admission Orders (From admission, onward)     03/16/21 0734  Inpatient Admission Once     Question Answer Comment   Level of Care Med Surg    Estimated length of stay More than 2 Midnights    Certification I certify that inpatient services are medically necessary for this patient for a duration of greater than two midnights  See H&P and MD Progress Notes for additional information about the patient's course of treatment  03/16/21 0734           ED Arrival Information     Expected Arrival Acuity Means of Arrival Escorted By Service Admission Type    - 3/15/2021 22:27 Emergent Walk-In Family Member General Medicine Emergency    Arrival Complaint    sob        Chief Complaint   Patient presents with    Shortness of Breath     pt has been congested all week and has been sob all day today  pt tried going to be and he became more sob     Assessment/Plan: 64 yo male w/ pmh CHF, COPD, CKD, Afib, to ED from home admitted Inpatient d/t acute resp failure with hypoxia  Presented with increasing shortness of breath for past 1 week  Sob worse with exertion  O2 sat 90% on ra  Leukocytosis  WBC 11 48  BNP 3579  Creat 2 09  Chest-bilateral air entry, some basilar crackles  [email or web address]  IV diuretic  Norvasc, Cozaar, statin  Flomax  bronchodilators  Tele  Daily weight and I&Os  diabetic/sodium 2 g/fluid restriction 1500     3/17:diuresed well and is on room air  pulse ox with ambulation done and did not require home O2  Creatinine improved to 1 82  Resolved leukocytosis  Symbicort and albuterol  DISCHARGED to home      ED Triage Vitals   Temperature Pulse Respirations Blood Pressure SpO2   03/15/21 2238 03/15/21 2238 03/15/21 2238 03/15/21 2238 03/15/21 2238   98 1 °F (36 7 °C) 103 20 (!) 198/117 90 %      Temp Source Heart Rate Source Patient Position - Orthostatic VS BP Location FiO2 (%)   03/16/21 0802 03/15/21 2238 03/15/21 2238 03/15/21 2238 03/16/21 0550   Oral Monitor Sitting Right arm 21      Pain Score       03/16/21 0002       No Pain          Wt Readings from Last 1 Encounters:   03/17/21 116 kg (256 lb 3 2 oz)     Additional Vital Signs:   03/17/21 07:36:11  98 1 °F (36 7 °C)  73  20  143/93  110  94 %  --  --  --  --  --  --   03/17/21 0402  --  --  --  --  --  94 %  21  --  --  BiPAP  Face mask  --   03/16/21 2239  --  --  --  --  --  97 %  21  --  --  BiPAP  Face mask  --   03/16/21 21:07:53  98 °F (36 7 °C)  70  18  144/93  110  96 %  --  --  --  --  --  --   03/16/21 2000  --  --  --  --  --  --  --  --  --  None (Room air)  --  --   03/16/21 15:13:19  97 8 °F (36 6 °C)  71  18  146/93  111  99 %  --  --  --  None (Room air)  --  Sitting   03/16/21 08:02:12  97 8 °F (36 6 °C)  74  18  141/88  106  95 %  --  --  --  None (Room air)  --  Lying   03/16/21 0550  --  --  --  --  --  96 %  21  --  --  BiPAP  Face mask  --   03/16/21 0238  --  --  --  --  --  95 %  --  28  2 L/min  Nasal cannula  --  --   03/16/21 01:33:08  98 5 °F (36 9 °C)  73  --  140/83  102  96 %  --  --  --  --  --  --   03/16/21 0030  --  72  16  131/78  99  98 %  --  --  --  --  --  --   03/15/21 2303  --  --  --  --  --  --  --  --  --  Nasal cannula  --  --   03/15/21 2245  --  77  34Abnormal   184/116Abnormal   141  94 %  --  --  --  --  --  --   03/15/21 2238  98 1 °F (36 7 °C)  103  20  198/117Abnormal   --  90 %  --  --  --             Pertinent Labs/Diagnostic Test Results:       Results from last 7 days   Lab Units 03/17/21  0505 03/16/21  0529 03/15/21  2300 03/15/21  2253   WBC Thousand/uL 7 79 9 56  --  11 48*   HEMOGLOBIN g/dL 11 3* 11 2*  --  11 8*   I STAT HEMOGLOBIN g/dl  --   --  13 3  --    HEMATOCRIT % 36 4* 36 7  --  38 0   HEMATOCRIT, ISTAT %  --   --  39  --    PLATELETS Thousands/uL 202 211  --  243   NEUTROS ABS Thousands/µL 5 38 7 07  --  7 36         Results from last 7 days   Lab Units 03/17/21  0505 03/16/21  0529 03/15/21  2300 03/15/21  2253   SODIUM mmol/L 143 146*  --  144   POTASSIUM mmol/L 3 7 3 6  --  4 0   CHLORIDE mmol/L 107 108  --  107   CO2 mmol/L 27 27  --  27   CO2, I-STAT mmol/L  --   --  29  --    ANION GAP mmol/L 9 11  --  10   BUN mg/dL 32* 34*  --  35*   CREATININE mg/dL 1 82* 2 08*  --  2 09*   EGFR ml/min/1 73sq m 36 31  --  31   CALCIUM mg/dL 8 8 8 9  --  8 7   CALCIUM, IONIZED, ISTAT mmol/L  --   --  1 21  --    MAGNESIUM mg/dL 2 0  --   --   --      Results from last 7 days   Lab Units 03/15/21  2253   AST U/L 27   ALT U/L 31   ALK PHOS U/L 71   TOTAL PROTEIN g/dL 7 3   ALBUMIN g/dL 3 7   TOTAL BILIRUBIN mg/dL 0 50     Results from last 7 days   Lab Units 03/16/21  1619 03/16/21  1130 03/16/21  0811 03/16/21  0206   POC GLUCOSE mg/dl 103 93 111 122     Results from last 7 days   Lab Units 03/17/21  0505 03/16/21  0529 03/15/21  2253   GLUCOSE RANDOM mg/dL 111 108 112         Results from last 7 days   Lab Units 03/15/21  2253   HEMOGLOBIN A1C % 5 2   EAG mg/dl 103       Results from last 7 days   Lab Units 03/15/21  2300   PH, ONEIL I-STAT  7 334   PCO2, ONEIL ISTAT mm HG 51 3*   PO2, ONEIL ISTAT mm HG 40 0   HCO3, ONEIL ISTAT mmol/L 27 3   I STAT BASE EXC mmol/L 1   I STAT O2 SAT % 70         Results from last 7 days   Lab Units 03/16/21  0820 03/15/21  2253   TROPONIN I ng/mL 0 04 0 04         Results from last 7 days   Lab Units 03/15/21  2253   PROTIME seconds 18 8*   INR  1 58*   PTT seconds 53*         Results from last 7 days   Lab Units 03/16/21  0820   PROCALCITONIN ng/ml 0 05       Results from last 7 days   Lab Units 03/15/21  2253   NT-PRO BNP pg/mL 3,579*     3/15 CXR:Mild pulmonary vascular congestion  No pneumothorax  3/16 ECHO:LEFT VENTRICLE:  Systolic function was at the lower limits of normal  Ejection fraction was estimated to be 50 %    Although no diagnostic regional wall motion abnormality was identified, this possibility cannot be completely excluded on the basis of this study  Wall thickness was moderately increased  Doppler parameters were consistent with both elevated ventricular end-diastolic filling pressure and elevated left atrial filling pressure      VENTRICULAR SEPTUM:  There was moderate dyssynergic motion  These changes are consistent with right ventricular pacing      LEFT ATRIUM:  The atrium was moderately dilated      ATRIAL SEPTUM:  There was a patent foramen ovale      RIGHT ATRIUM:  The atrium was moderately dilated      MITRAL VALVE:  There was mild annular calcification  Transmitral velocity was increased due to increased transvalvular flow  There was severe regurgitation      AORTIC VALVE:  The valve was trileaflet  Leaflets exhibited normal thickness, mild to moderate calcification, and moderately reduced cuspal separation  There was moderate stenosis  Valve mean gradient was 12 mmHg  Estimated aortic valve area (by VTI) was 1 37 cmï¾²  Estimated aortic valve area (by Vmax) was 1 45 cmï¾²     TRICUSPID VALVE:  There was mild to moderate regurgitation  Pulmonary artery systolic pressure was moderately increased      AORTA:  There was mild to moderate dilatation of the ascending aorta  The ascending aortic AP dimension was 46 mm      IVC, HEPATIC VEINS:  The inferior vena cava was dilated      PERICARDIUM:  A small, free-flowing pericardial effusion was identified posterior to the heart   There was no evidence of hemodynamic compromise        3/16 EKG:  Atrial fibrillation with intermittent ventricular pacing  Abnormal ECG  When compared with ECG of 09-APR-2017 05:54,  No significant change was found  Confirmed by Arlene Pete ([id number]) on 3/16/2021 8:29:28 AM    ED Treatment:   Medication Administration from 03/15/2021 2227 to 03/16/2021 0121       Date/Time Order Dose Route Action Action by Comments     03/15/2021 2309 nitroglycerin (NITROSTAT) SL tablet 0 4 mg 0 4 mg Sublingual Given       03/15/2021 2311 albuterol inhalation solution 5 mg 5 mg Nebulization Given       03/15/2021 2308 aspirin chewable tablet 324 mg 324 mg Oral Given       03/16/2021 0020 furosemide (LASIX) injection 40 mg 40 mg Intravenous Given          Past Medical History:   Diagnosis Date    A-fib ([place] [address] )     CHF (congestive heart failure) ([facility])     COPD (chronic obstructive pulmonary disease) ([facility])     Diabetes mellitus ([place] [address] )     Enlarged prostate     Gout     Hyperlipidemia     Hypertension      Present on Admission:   Acute respiratory failure with hypoxia (HCC)   Chronic obstructive pulmonary disease (COPD) ([facility])   Type 2 diabetes mellitus, without long-term current use of insulin (HCC)   Paroxysmal atrial fibrillation ([facility])      Admitting Diagnosis: Chest pain [R07 9]  SOB (shortness of breath) [R06 02]  Hypoxia [R09 02]  Acute exacerbation of CHF (congestive heart failure) ([place] [address] ) [I50 9]  Age/Sex: 65 y o  male  Admission Orders:  Scheduled Medications:  allopurinol, 100 mg, Oral, Daily  amLODIPine, 5 mg, Oral, Daily  aspirin, 81 mg, Oral, Daily  budesonide-formoterol, 2 puff, Inhalation, BID  dabigatran etexilate, 150 mg, Oral, BID  folic acid, 1 mg, Oral, Daily  furosemide, 40 mg, Intravenous, BID (diuretic)  guaiFENesin, 600 mg, Oral, Q12H [address]  insulin lispro, 1-6 Units, Subcutaneous, TID AC  insulin lispro, 1-6 Units, Subcutaneous, HS  losartan, 100 mg, Oral, Daily  potassium chloride, 20 mEq, Oral, Daily  pravastatin, 20 mg, Oral, Daily With Dinner  tamsulosin, 0 8 mg, Oral, Daily With Dinner      PRN Meds:  acetaminophen, 650 mg, Oral, Q6H PRN  albuterol, 2 5 mg, Nebulization, Q6H PRN    SCD  PT/OT  TELE  CONS CARB DIET  I&O  DAILY WEIGHTS  OOB      IP CONSULT TO NUTRITION SERVICES  IP CONSULT TO CASE MANAGEMENT    Network Utilization Review Department  ATTENTION: Please call with any questions or concerns to [phone] and carefully listen to the prompts so that you are directed to the right person   All voicemails are confidential   Maria L Chin all requests for admission clinical reviews, approved or denied determinations and any other requests to dedicated fax number below belonging to the campus where the patient is receiving treatment   List of dedicated fax numbers for the Facilities:  [address] DENIALS (Administrative/Medical Necessity) [phone]   [address] (Maternity/NICU/Pediatrics) [phone] [address] [place] [address] ([place]) [address] [address] Archie Boucher [address] [place] [id number] [phone]

## 2021-03-17 NOTE — PLAN OF CARE
Problem: Potential for Falls  Goal: Patient will remain free of falls  Description: INTERVENTIONS:  - Assess patient frequently for physical needs  -  Identify cognitive and physical deficits and behaviors that affect risk of falls    -  [place] fall precautions as indicated by assessment   - Educate patient/family on patient safety including physical limitations  - Instruct patient to call for assistance with activity based on assessment  - Modify environment to reduce risk of injury  - Consider OT/PT consult to assist with strengthening/mobility  Outcome: Adequate for Discharge

## 2021-03-17 NOTE — DISCHARGE INSTR - AVS FIRST PAGE
Dear Brandyn Colunga,     It was our pleasure to care for you here at [facility], [address] [facility]  It is our hope that we were always able to exceed the expected standards for your care during your stay  You were hospitalized due to shortness of breath  You were cared for on the medical floor by Alexsander Mahoney MD with the [address] [place] [address] [facility] who covers for your primary care physician (PCP), Danitza Haddad MD, while you were hospitalized  If you have any questions or concerns related to this hospitalization, you may contact us at [phone]  For follow up as well as any medication refills, we recommend that you follow up with your primary care physician  A registered nurse will reach out to you by phone within a few days after your discharge to answer any additional questions that you may have after going home  However, at this time we provide for you here, the most important instructions / recommendations at discharge:     · Other Instructions -   Follow-up with PCP in 1 week  Follow-up with Cardiology as outpatient in 1-2 weeks  Return to ER with any worsening shortness of breath, chest pain, palpitation or any alarming symptoms  · Please review this entire after visit summary as additional general instructions including medication list, appointments, activity, diet, any pertinent wound care, and other additional recommendations from your care team that may be provided for you        Sincerely,     Alexsander Mahoney MD

## 2021-03-17 NOTE — ASSESSMENT & PLAN NOTE
Lab Results   Component Value Date    EGFR 36 03/17/2021    EGFR 31 03/16/2021    EGFR 31 03/15/2021    CREATININE 1 82 (H) 03/17/2021    CREATININE 2 08 (H) 03/16/2021    CREATININE 2 09 (H) 03/15/2021   · Creatinine 2 09 on admission  · Baseline about 1 5-2 0 on labs from 2017  · Creatinine improved to 1 82 on discharge  · Continue outpatient follow-up with Nephrology

## 2021-03-17 NOTE — DISCHARGE SUMMARY
[place]  Discharge- Jc Brower 1950, 65 y o  male MRN: [id number]  Unit/Bed#: -01 Encounter: [id number]  Primary Care Provider: Joseluis Brock MD   Date and time admitted to hospital: 3/15/2021 10:37 PM    Obstructive sleep apnea  Assessment & Plan  · Wears CPAP machine at home   · Order CPAP HS    Leukocytosis  Assessment & Plan  · White blood cell 11 48 on admission   · Patient afebrile, denies fevers or chills at home  · Patient with shortness of breath and congestion, does not appear bacterial  · Hold off on starting antibiotics at this time  · Procalcitonin is normal  · Resolved    Stage 3b chronic kidney disease  Assessment & Plan  Lab Results   Component Value Date    EGFR 36 03/17/2021    EGFR 31 03/16/2021    EGFR 31 03/15/2021    CREATININE 1 82 (H) 03/17/2021    CREATININE 2 08 (H) 03/16/2021    CREATININE 2 09 (H) 03/15/2021   · Creatinine 2 09 on admission  · Baseline about 1 5-2 0 on labs from 2017  · Creatinine improved to 1 82 on discharge  · Continue outpatient follow-up with Nephrology    Acute on chronic congestive heart failure (HCC)  Assessment & Plan  Wt Readings from Last 3 Encounters:   03/17/21 116 kg (256 lb 3 2 oz)   02/26/18 134 kg (294 lb 9 6 oz)   04/12/17 133 kg (293 lb 14 oz)     · Came in due to worsening shortness of breath over the past week that is worse with exertion  Patient also with nasal congestion  Denies fevers or chills  · Admitted with acute on chronic diastolic heart failure  · 90% on room air, placed on 2 L nasal cannula at 96%  · BNP 3579 on admission (Was 1,620 in 2017)  · Possibly chronically elevated in the 3000s now  · Last echo December 2019:  EF 50%  · Echo showed ejection fraction of 50%  · At home takes torsemide 20 mg b i d   · Hold home torsemide  In the ED given 40 mg IV Lasix    Continue 40 mg IV Lasix b i d   · Monitor I/Os and daily weights  · Place on diabetic/sodium 2 g/fluid restriction 1500  · Patient diuresed well and is on room air  · Patient had pulse ox with ambulation done and did not require home O2  · Patient will be discharged on torsemide 40 mg daily and follow up with his primary cardiologist at [address] [place] [address] hypertension  Assessment & Plan  · Continue Norvasc 5 mg daily, losartan 100 mg daily  · Stable    Type 2 diabetes mellitus, without long-term current use of insulin [facility])  Assessment & Plan  Lab Results   Component Value Date    HGBA1C 5 2 03/15/2021       Recent Labs     03/16/21  1619 03/16/21  2106 03/17/21  0734 03/17/21  1112   POCGLU 103 138 105 89       Blood Sugar Average: Last 72 hrs:  (P) 108 [id number]  · Hold home glipizide 15 mg b i d   · Start sliding scale insulin    Paroxysmal atrial fibrillation (HCC)  Assessment & Plan  · History of atrial fibrillation   · Not currently in Afib   · Stable     Chronic obstructive pulmonary disease (COPD) (HCC)  Assessment & Plan  · Continue Symbicort b i d  and albuterol p r n  · Does not appear to be in COPD exacerbation    * Acute respiratory failure with hypoxia (HCC)  Assessment & Plan  · Came in due to increasing shortness of breath with congestion that is worse on exertion x1 week   · In the ED placed on 2 L nasal cannula 96%   · Patient is saturating well on room air  · Patient had pulse ox with ambulation done and did not require home O2        Hospital Course:     Sunny Serrano is a 65 y o  male patient who originally presented to the hospital on   Admission Orders (From admission, onward)     Ordered        03/16/21 0734  Inpatient Admission  Once         03/16/21 0001  Place in Observation  Once                  due to increasing shortness of breath for past 1 week  Patient denied any chest pain, nausea, vomiting, dizziness or any other complaints  Patient was requiring 2 L of supplemental oxygen in ER    Patient was admitted with acute on chronic diastolic heart failure, acute respiratory failure with hypoxia and was started on Lasix 40 mg twice daily  Patient diuresed well  Patient chest x-ray had shown mild vascular congestion  Patient had an echocardiogram done during this admission  Patient was weaned off oxygen  Patient had pulse ox with ambulation done and did not require home O2  Patient will be discharged on torsemide 40 mg daily and follow-up with outpatient Cardiology  Patient was given dietary education and was recommended to follow-up with his primary cardiologist as outpatient  Patient is hemodynamically stable for discharge  Patient was seen by PT and case management and did not require any home care services  On exam  Chest-clear to auscultation  Abd-soft, nontender  Heart-S1-S2 regular  Neuro-alert awake oriented x3  No focal deficits  Follow-up with PCP in 1 week  Follow-up with Cardiology as outpatient in 1-2 weeks  Return to ER with any worsening shortness of breath, chest pain, palpitation or any alarming symptoms  Please see above list of diagnoses and related plan for additional information  Condition at Discharge:  good      Discharge instructions/Information to patient and family:   See after visit summary for information provided to patient and family  Provisions for Follow-Up Care:  See after visit summary for information related to follow-up care and any pertinent home health orders  Disposition:     Home       Discharge Statement:  I spent 45 minutes discharging the patient  This time was spent on the day of discharge  I had direct contact with the patient on the day of discharge  Greater than 50% of the total time was spent examining patient, answering all patient questions, arranging and discussing plan of care with patient as well as directly providing post-discharge instructions  Additional time then spent on discharge activities      Discharge Medications:  See after visit summary for reconciled discharge medications provided to patient and family        ** Please Note: This note has been constructed using a voice recognition system **

## 2021-03-18 ENCOUNTER — TELEPHONE (OUTPATIENT)
Dept: FAMILY MEDICINE CLINIC | Facility: CLINIC | Age: 71
End: 2021-03-18

## 2021-03-18 NOTE — TELEPHONE ENCOUNTER
Isaak Rodriguez called stating he was discharged from [facility]( [facility] )and was told to contact our office for an appointment  He and his wife would like to be patients here  ( I made an appointment for his wife April 19)   He would need a TCM appointment please

## 2021-03-18 NOTE — TELEPHONE ENCOUNTER
That's fine but I need to know who they plan to see  I would have to schedule him with Katha Levels if that is OK with them    Camille

## 2021-03-19 NOTE — UTILIZATION REVIEW
Notification of Discharge  This is a Notification of Discharge from our facility [address]  Please be advised that this patient has been discharge from our facility  Below you will find the admission and discharge date and time including the patients disposition  PRESENTATION DATE: 3/15/2021 10:37 PM  OBS ADMISSION DATE:   IP ADMISSION DATE: 3/16/21 0734   DISCHARGE DATE: 3/17/2021  1:41 PM  DISPOSITION: Home/Self Care Home/Self Care   Admission Orders listed below:  Admission Orders (From admission, onward)     Ordered        03/16/21 0734  Inpatient Admission  Once         03/16/21 0001  Place in Observation  Once                   Please contact the UR Department if additional information is required to close this patient's authorization/case  Kamlesh Parada  [facility] Utilization Review Department  Main: [phone] x carefully listen to the prompts  All voicemails are confidential   [email or web address]  Send all requests for admission clinical reviews, approved or denied determinations and any other requests to dedicated fax number below belonging to the campus where the patient is receiving treatment   List of dedicated fax numbers:  [address] DENIALS (Administrative/Medical Necessity) [phone]   [address] (Maternity/NICU/Pediatrics) [phone]   Fredy Weems [phone]   Kayla Promise [phone]   [address] [place] [address] [phone]   145 [facility] 1525 [facility] [phone]   Idalia Quant [phone]   220[address] [facility]  2401 [facility] Main 1000 [address] [facility] [phone]

## 2021-03-22 ENCOUNTER — TRANSITIONAL CARE MANAGEMENT (OUTPATIENT)
Dept: FAMILY MEDICINE CLINIC | Facility: CLINIC | Age: 71
End: 2021-03-22

## 2021-03-22 RX ORDER — METOPROLOL SUCCINATE 50 MG/1
50 TABLET, EXTENDED RELEASE ORAL DAILY
COMMUNITY

## 2021-03-22 RX ORDER — FERROUS SULFATE 325(65) MG
325 TABLET ORAL
COMMUNITY

## 2021-03-22 RX ORDER — HYDRALAZINE HYDROCHLORIDE 25 MG/1
TABLET, FILM COATED ORAL
COMMUNITY
Start: 2020-10-19 | End: 2021-11-03 | Stop reason: HOSPADM

## 2021-03-22 RX ORDER — ALBUTEROL SULFATE 90 UG/1
AEROSOL, METERED RESPIRATORY (INHALATION)
COMMUNITY
Start: 2020-10-19

## 2021-03-22 NOTE — TELEPHONE ENCOUNTER
Patient called  He was rather anxious to get a TCM appt scheduled  He doesn't want to wind up in the hospital again  He had switched insurances and needs to see us  I told him I would leave a message and we would call him back  [phone]  Thank you

## 2021-03-26 ENCOUNTER — OFFICE VISIT (OUTPATIENT)
Dept: FAMILY MEDICINE CLINIC | Facility: CLINIC | Age: 71
End: 2021-03-26
Payer: COMMERCIAL

## 2021-03-26 VITALS
DIASTOLIC BLOOD PRESSURE: 80 MMHG | HEIGHT: 71 IN | TEMPERATURE: 98.6 F | SYSTOLIC BLOOD PRESSURE: 134 MMHG | HEART RATE: 94 BPM | WEIGHT: 262.8 LBS | OXYGEN SATURATION: 97 % | RESPIRATION RATE: 20 BRPM | BODY MASS INDEX: 36.79 KG/M2

## 2021-03-26 DIAGNOSIS — I50.42 CHRONIC COMBINED SYSTOLIC AND DIASTOLIC CONGESTIVE HEART FAILURE (HCC): ICD-10-CM

## 2021-03-26 DIAGNOSIS — Z76.89 ENCOUNTER FOR SUPPORT AND COORDINATION OF TRANSITION OF CARE: Primary | ICD-10-CM

## 2021-03-26 PROCEDURE — 99495 TRANSJ CARE MGMT MOD F2F 14D: CPT | Performed by: NURSE PRACTITIONER

## 2021-03-26 PROCEDURE — 1111F DSCHRG MED/CURRENT MED MERGE: CPT | Performed by: NURSE PRACTITIONER

## 2021-03-26 RX ORDER — PREDNISOLONE ACETATE 10 MG/ML
SUSPENSION/ DROPS OPHTHALMIC
COMMUNITY
Start: 2021-03-23 | End: 2021-10-31 | Stop reason: CLARIF

## 2021-03-26 RX ORDER — FLURBIPROFEN SODIUM 0.3 MG/ML
SOLUTION/ DROPS OPHTHALMIC
COMMUNITY
Start: 2021-03-23 | End: 2021-10-31 | Stop reason: CLARIF

## 2021-03-26 RX ORDER — ASPIRIN 81 MG/1
81 TABLET, CHEWABLE ORAL DAILY
COMMUNITY

## 2021-03-26 RX ORDER — CIPROFLOXACIN HYDROCHLORIDE 3.5 MG/ML
SOLUTION/ DROPS TOPICAL
COMMUNITY
Start: 2021-03-23 | End: 2021-10-31 | Stop reason: CLARIF

## 2021-03-26 NOTE — ASSESSMENT & PLAN NOTE
Lab Results   Component Value Date    HGBA1C 5 2 03/15/2021   continues on oral medications for diabetes, last hemA1C as above, medications for the [facility], will continue to monitor

## 2021-03-26 NOTE — PATIENT INSTRUCTIONS
Continue all medications  Continue to weigh daily and call cardiology if 2-4 lb weight gain   Medicare Wellness -schedule when we obtain records from [place] for yearly

## 2021-03-26 NOTE — ASSESSMENT & PLAN NOTE
BP stable 134/80, continues on all medications, combination of HTN medications, Denies headache or visual changes     Will continue to monitor

## 2021-03-26 NOTE — PROGRESS NOTES
Assessment/Plan:    Type 2 diabetes mellitus, without long-term current use of insulin (HCC)    Lab Results   Component Value Date    HGBA1C 5 2 03/15/2021   continues on oral medications for diabetes, last hemA1C as above, medications for the [place], will continue to monitor    Chronic obstructive pulmonary disease (COPD) (HCC)  Continues Symbicort and Proventil denies SOB or wheezing, will continue to monitor     Acute on chronic congestive heart failure (HCC)  Wt Readings from Last 3 Encounters:   03/26/21 119 kg (262 lb 12 8 oz)   03/17/21 116 kg (256 lb 3 2 oz)   02/26/18 134 kg (294 lb 9 6 oz)   Weighs daily, knows to alert cardiology if more than 2 lbs in a day 4 in a week, see [facility]  Without swelling or edema today  Will continue to monitor           Essential hypertension  BP stable 134/80, continues on all medications, combination of HTN medications, Denies headache or visual changes  Will continue to monitor    Paroxysmal atrial fibrillation (HCC)  HR regular, currently taking Pradaxa, managed by cardiology  Problem List Items Addressed This Visit     None      Visit Diagnoses     Encounter for support and coordination of transition of care    -  Primary    Chronic combined systolic and diastolic congestive heart failure ([facility] [address] )                Subjective:      Patient ID: Otto Reyes is a 65 y o  male  Here today to establish care with this practice and a TCM appointment for admission to [address] [place] [address] 3/15 through 3/17, for CHF    No changes were made in his medications  He currently sees [facility]-Dr Priya Omer  He also is treated at the [facility] and has several appointments scheduled in follow up     He reports he feels better than "in a long time" Occasional SOB with exertion    He weighs daily and knows the 2 lb in a day or 4 lb in a week, to call cardiology    No other issues to discuss today      The following portions of the patient's history were reviewed and updated as appropriate: current medications, past family history, past medical history, past social history, past surgical history and problem list     Review of Systems   Constitutional: Negative  HENT: Negative  Congestion: nose feel stuffy  Eyes: Negative  Respiratory: Negative  Shortness of breath: MALDONADO  Cardiovascular: Negative  Leg swelling: slight but "wears support hose which reduces"   Gastrointestinal: Negative  Endocrine: Negative  Genitourinary: Negative  Musculoskeletal: Negative  Skin: Negative  Allergic/Immunologic: Negative  Neurological: Negative  Hematological: Negative  Psychiatric/Behavioral: Negative  Objective:      /80 (BP Location: Left arm, Patient Position: Sitting, Cuff Size: Large)   Pulse 94   Temp 98 6 °F (37 °C) (Tympanic)   Resp 20   Ht 5' 11" (1 803 m)   Wt 119 kg (262 lb 12 8 oz)   SpO2 97%   BMI 36 65 kg/m²          Physical Exam  Vitals signs and nursing note reviewed  Constitutional:       Appearance: Normal appearance  He is obese  He is not ill-appearing  HENT:      Head: Normocephalic and atraumatic  Nose: Congestion present  Mouth/Throat:      Mouth: Mucous membranes are moist    Eyes:      General:         Right eye: No discharge  Left eye: No discharge  Extraocular Movements: Extraocular movements intact  Conjunctiva/sclera: Conjunctivae normal    Neck:      Musculoskeletal: Normal range of motion and neck supple  No neck rigidity or muscular tenderness  Vascular: No carotid bruit  Cardiovascular:      Rate and Rhythm: Normal rate and regular rhythm  Pulses: Normal pulses  Heart sounds: Normal heart sounds  No murmur  No gallop  Pulmonary:      Effort: Pulmonary effort is normal       Breath sounds: Normal breath sounds  Abdominal:      General: Abdomen is flat  Bowel sounds are normal       Palpations: Abdomen is soft  Tenderness: There is no abdominal tenderness  There is no right CVA tenderness or left CVA tenderness  Musculoskeletal: Normal range of motion  General: No swelling or signs of injury  Right lower leg: No edema  Left lower leg: No edema  Lymphadenopathy:      Cervical: No cervical adenopathy  Skin:     General: Skin is warm and dry  Capillary Refill: Capillary refill takes less than 2 seconds  Coloration: Skin is not pale  Findings: No bruising or lesion  Neurological:      Mental Status: He is alert and oriented to person, place, and time  Motor: No weakness  Coordination: Coordination normal       Gait: Gait normal    Psychiatric:         Mood and Affect: Mood normal          Behavior: Behavior normal          Thought Content: Thought content normal          Judgment: Judgment normal          TCM Call (since 2/23/2021)     Date and time call was made  3/22/2021  5:00 PM    Hospital care reviewed  Records reviewed    Patient was hospitialized at  [facility]        Date of Admission  03/15/21    Date of discharge  03/17/21    Diagnosis  Acute on Chronic CHF - COPD - DM - PAF - HTN - CKD Stage 3 - Acute RF with Hypoxia    Disposition  Home; Home health services    Were the patients medications reviewed and updated  Yes    Current Symptoms  None      TCM Call (since 2/23/2021)     Post hospital issues  None    Should patient be enrolled in anticoag monitoring? No    Scheduled for follow up?   Yes    Patients specialists  Cardiologist    Cardiologist name  Dr Alka Mcgee    Did you obtain your prescribed medications  Yes    Do you need help managing your prescriptions or medications  No    Is transportation to your appointment needed  No    I have advised the patient to call PCP with any new or worsening symptoms  DESIRAE Braden     Living Arrangements  Spouse or Significiant other    Support System  Family    The type of support provided  Emotional; Physical    Do you have social support Yes, as much as I need    Are you recieving any outpatient services  No    Are you recieving home care services  No    Are you using any community resources  No    Current waiver services  No    Have you fallen in the last 12 months  No    Interperter language line needed  No    Counseling  Patient    Counseling topics  instructions for management;  Importance of RX compliance

## 2021-03-26 NOTE — ASSESSMENT & PLAN NOTE
Wt Readings from Last 3 Encounters:   03/26/21 119 kg (262 lb 12 8 oz)   03/17/21 116 kg (256 lb 3 2 oz)   02/26/18 134 kg (294 lb 9 6 oz)   Weighs daily, knows to alert cardiology if more than 2 lbs in a day 4 in a week, see [facility]  Without swelling or edema today   Will continue to monitor

## 2021-05-05 LAB — EXT SARS-COV-2: NEGATIVE

## 2021-05-12 ENCOUNTER — TELEPHONE (OUTPATIENT)
Dept: FAMILY MEDICINE CLINIC | Facility: CLINIC | Age: 71
End: 2021-05-12

## 2021-05-12 NOTE — TELEPHONE ENCOUNTER
Spoke to patient, [facility], patient will get copy when he goes for his appointment next week    rozina

## 2021-09-21 ENCOUNTER — APPOINTMENT (OUTPATIENT)
Dept: RADIOLOGY | Facility: CLINIC | Age: 71
End: 2021-09-21
Payer: COMMERCIAL

## 2021-09-21 ENCOUNTER — OFFICE VISIT (OUTPATIENT)
Dept: URGENT CARE | Facility: CLINIC | Age: 71
End: 2021-09-21
Payer: COMMERCIAL

## 2021-09-21 VITALS
SYSTOLIC BLOOD PRESSURE: 136 MMHG | HEIGHT: 71 IN | BODY MASS INDEX: 36.43 KG/M2 | WEIGHT: 260.2 LBS | HEART RATE: 71 BPM | OXYGEN SATURATION: 97 % | RESPIRATION RATE: 16 BRPM | TEMPERATURE: 97.5 F | DIASTOLIC BLOOD PRESSURE: 84 MMHG

## 2021-09-21 DIAGNOSIS — M25.562 LEFT KNEE PAIN, UNSPECIFIED CHRONICITY: ICD-10-CM

## 2021-09-21 DIAGNOSIS — S83.412A SPRAIN OF MEDIAL COLLATERAL LIGAMENT OF LEFT KNEE, INITIAL ENCOUNTER: Primary | ICD-10-CM

## 2021-09-21 PROCEDURE — 73564 X-RAY EXAM KNEE 4 OR MORE: CPT

## 2021-09-21 RX ORDER — OMEGA-3S/DHA/EPA/FISH OIL/D3 300MG-1000
400 CAPSULE ORAL DAILY
COMMUNITY

## 2021-09-21 RX ORDER — PANTOPRAZOLE SODIUM 40 MG/1
40 TABLET, DELAYED RELEASE ORAL DAILY
COMMUNITY

## 2021-10-31 ENCOUNTER — HOSPITAL ENCOUNTER (INPATIENT)
Facility: HOSPITAL | Age: 71
LOS: 2 days | Discharge: HOME | DRG: 291 | End: 2021-11-03
Attending: EMERGENCY MEDICINE | Admitting: STUDENT IN AN ORGANIZED HEALTH CARE EDUCATION/TRAINING PROGRAM
Payer: COMMERCIAL

## 2021-10-31 ENCOUNTER — APPOINTMENT (EMERGENCY)
Dept: RADIOLOGY | Facility: HOSPITAL | Age: 71
DRG: 291 | End: 2021-10-31
Payer: COMMERCIAL

## 2021-10-31 DIAGNOSIS — I50.9 CONGESTIVE HEART FAILURE (CHF) (HCC): Primary | ICD-10-CM

## 2021-10-31 DIAGNOSIS — N18.32 STAGE 3B CHRONIC KIDNEY DISEASE (HCC): ICD-10-CM

## 2021-10-31 DIAGNOSIS — I10 ESSENTIAL HYPERTENSION: ICD-10-CM

## 2021-10-31 DIAGNOSIS — R07.9 CHEST PAIN: ICD-10-CM

## 2021-10-31 DIAGNOSIS — R09.02 HYPOXIA: ICD-10-CM

## 2021-10-31 LAB
ALBUMIN SERPL BCP-MCNC: 3.5 G/DL (ref 3.5–5)
ALP SERPL-CCNC: 74 U/L (ref 46–116)
ALT SERPL W P-5'-P-CCNC: 33 U/L (ref 12–78)
ANION GAP SERPL CALCULATED.3IONS-SCNC: 8 MMOL/L (ref 4–13)
AST SERPL W P-5'-P-CCNC: 16 U/L (ref 5–45)
BASOPHILS # BLD AUTO: 0.04 THOUSANDS/ΜL (ref 0–0.1)
BASOPHILS NFR BLD AUTO: 0 % (ref 0–1)
BILIRUB SERPL-MCNC: 0.8 MG/DL (ref 0.2–1)
BUN SERPL-MCNC: 38 MG/DL (ref 5–25)
CALCIUM SERPL-MCNC: 8.6 MG/DL (ref 8.3–10.1)
CHLORIDE SERPL-SCNC: 107 MMOL/L (ref 100–108)
CO2 SERPL-SCNC: 27 MMOL/L (ref 21–32)
CREAT SERPL-MCNC: 2.26 MG/DL (ref 0.6–1.3)
EOSINOPHIL # BLD AUTO: 0.21 THOUSAND/ΜL (ref 0–0.61)
EOSINOPHIL NFR BLD AUTO: 2 % (ref 0–6)
ERYTHROCYTE [DISTWIDTH] IN BLOOD BY AUTOMATED COUNT: 15.7 % (ref 11.6–15.1)
FLUAV RNA RESP QL NAA+PROBE: NEGATIVE
FLUBV RNA RESP QL NAA+PROBE: NEGATIVE
GFR SERPL CREATININE-BSD FRML MDRD: 28 ML/MIN/1.73SQ M
GLUCOSE SERPL-MCNC: 148 MG/DL (ref 65–140)
HCT VFR BLD AUTO: 35.7 % (ref 36.5–49.3)
HGB BLD-MCNC: 11 G/DL (ref 12–17)
IMM GRANULOCYTES # BLD AUTO: 0.04 THOUSAND/UL (ref 0–0.2)
IMM GRANULOCYTES NFR BLD AUTO: 0 % (ref 0–2)
LYMPHOCYTES # BLD AUTO: 1.66 THOUSANDS/ΜL (ref 0.6–4.47)
LYMPHOCYTES NFR BLD AUTO: 14 % (ref 14–44)
MCH RBC QN AUTO: 29.2 PG (ref 26.8–34.3)
MCHC RBC AUTO-ENTMCNC: 30.8 G/DL (ref 31.4–37.4)
MCV RBC AUTO: 95 FL (ref 82–98)
MONOCYTES # BLD AUTO: 1 THOUSAND/ΜL (ref 0.17–1.22)
MONOCYTES NFR BLD AUTO: 8 % (ref 4–12)
NEUTROPHILS # BLD AUTO: 9.03 THOUSANDS/ΜL (ref 1.85–7.62)
NEUTS SEG NFR BLD AUTO: 76 % (ref 43–75)
NT-PROBNP SERPL-MCNC: 6060 PG/ML
PLATELET # BLD AUTO: 238 THOUSANDS/UL (ref 149–390)
PMV BLD AUTO: 9.6 FL (ref 8.9–12.7)
POTASSIUM SERPL-SCNC: 4 MMOL/L (ref 3.5–5.3)
PROT SERPL-MCNC: 7.2 G/DL (ref 6.4–8.2)
RBC # BLD AUTO: 3.77 MILLION/UL (ref 3.88–5.62)
RSV RNA RESP QL NAA+PROBE: NEGATIVE
SARS-COV-2 RNA RESP QL NAA+PROBE: NEGATIVE
SODIUM SERPL-SCNC: 142 MMOL/L (ref 136–145)
TROPONIN I SERPL-MCNC: 0.05 NG/ML
WBC # BLD AUTO: 11.98 THOUSAND/UL (ref 4.31–10.16)

## 2021-10-31 PROCEDURE — 80053 COMPREHEN METABOLIC PANEL: CPT | Performed by: EMERGENCY MEDICINE

## 2021-10-31 PROCEDURE — 99285 EMERGENCY DEPT VISIT HI MDM: CPT | Performed by: EMERGENCY MEDICINE

## 2021-10-31 PROCEDURE — 0241U HB NFCT DS VIR RESP RNA 4 TRGT: CPT | Performed by: EMERGENCY MEDICINE

## 2021-10-31 PROCEDURE — 71045 X-RAY EXAM CHEST 1 VIEW: CPT

## 2021-10-31 PROCEDURE — 36415 COLL VENOUS BLD VENIPUNCTURE: CPT | Performed by: EMERGENCY MEDICINE

## 2021-10-31 PROCEDURE — 96374 THER/PROPH/DIAG INJ IV PUSH: CPT

## 2021-10-31 PROCEDURE — 99285 EMERGENCY DEPT VISIT HI MDM: CPT

## 2021-10-31 PROCEDURE — 99223 1ST HOSP IP/OBS HIGH 75: CPT | Performed by: INTERNAL MEDICINE

## 2021-10-31 PROCEDURE — 85025 COMPLETE CBC W/AUTO DIFF WBC: CPT | Performed by: EMERGENCY MEDICINE

## 2021-10-31 PROCEDURE — 93005 ELECTROCARDIOGRAM TRACING: CPT

## 2021-10-31 PROCEDURE — 84484 ASSAY OF TROPONIN QUANT: CPT | Performed by: EMERGENCY MEDICINE

## 2021-10-31 PROCEDURE — 94002 VENT MGMT INPAT INIT DAY: CPT

## 2021-10-31 PROCEDURE — 94760 N-INVAS EAR/PLS OXIMETRY 1: CPT

## 2021-10-31 PROCEDURE — 83880 ASSAY OF NATRIURETIC PEPTIDE: CPT | Performed by: EMERGENCY MEDICINE

## 2021-10-31 RX ORDER — ASPIRIN 81 MG/1
81 TABLET, CHEWABLE ORAL DAILY
Status: DISCONTINUED | OUTPATIENT
Start: 2021-11-01 | End: 2021-11-03 | Stop reason: HOSPADM

## 2021-10-31 RX ORDER — LOSARTAN POTASSIUM 50 MG/1
100 TABLET ORAL DAILY
Status: DISCONTINUED | OUTPATIENT
Start: 2021-11-01 | End: 2021-11-03 | Stop reason: HOSPADM

## 2021-10-31 RX ORDER — AMLODIPINE BESYLATE 5 MG/1
5 TABLET ORAL DAILY
Status: DISCONTINUED | OUTPATIENT
Start: 2021-11-01 | End: 2021-11-03 | Stop reason: HOSPADM

## 2021-10-31 RX ORDER — PRAVASTATIN SODIUM 20 MG
20 TABLET ORAL
Status: DISCONTINUED | OUTPATIENT
Start: 2021-11-01 | End: 2021-11-03 | Stop reason: HOSPADM

## 2021-10-31 RX ORDER — FERROUS SULFATE 325(65) MG
325 TABLET ORAL
Status: DISCONTINUED | OUTPATIENT
Start: 2021-11-01 | End: 2021-11-03 | Stop reason: HOSPADM

## 2021-10-31 RX ORDER — POTASSIUM CHLORIDE 20 MEQ/1
20 TABLET, EXTENDED RELEASE ORAL ONCE
Status: COMPLETED | OUTPATIENT
Start: 2021-10-31 | End: 2021-10-31

## 2021-10-31 RX ORDER — ALBUTEROL SULFATE 90 UG/1
1 AEROSOL, METERED RESPIRATORY (INHALATION) EVERY 6 HOURS PRN
Status: DISCONTINUED | OUTPATIENT
Start: 2021-10-31 | End: 2021-11-03 | Stop reason: HOSPADM

## 2021-10-31 RX ORDER — DABIGATRAN ETEXILATE 150 MG/1
150 CAPSULE, COATED PELLETS ORAL 2 TIMES DAILY
Status: DISCONTINUED | OUTPATIENT
Start: 2021-11-01 | End: 2021-11-03 | Stop reason: HOSPADM

## 2021-10-31 RX ORDER — PANTOPRAZOLE SODIUM 40 MG/1
40 TABLET, DELAYED RELEASE ORAL DAILY
Status: DISCONTINUED | OUTPATIENT
Start: 2021-11-01 | End: 2021-11-03 | Stop reason: HOSPADM

## 2021-10-31 RX ORDER — FUROSEMIDE 10 MG/ML
40 INJECTION INTRAMUSCULAR; INTRAVENOUS ONCE
Status: COMPLETED | OUTPATIENT
Start: 2021-10-31 | End: 2021-10-31

## 2021-10-31 RX ORDER — ALBUTEROL SULFATE 2.5 MG/3ML
5 SOLUTION RESPIRATORY (INHALATION) ONCE
Status: COMPLETED | OUTPATIENT
Start: 2021-10-31 | End: 2021-10-31

## 2021-10-31 RX ORDER — TAMSULOSIN HYDROCHLORIDE 0.4 MG/1
0.8 CAPSULE ORAL
Status: DISCONTINUED | OUTPATIENT
Start: 2021-11-01 | End: 2021-11-03 | Stop reason: HOSPADM

## 2021-10-31 RX ORDER — BUDESONIDE AND FORMOTEROL FUMARATE DIHYDRATE 160; 4.5 UG/1; UG/1
2 AEROSOL RESPIRATORY (INHALATION) 2 TIMES DAILY
Status: DISCONTINUED | OUTPATIENT
Start: 2021-11-01 | End: 2021-11-03 | Stop reason: HOSPADM

## 2021-10-31 RX ORDER — METOPROLOL SUCCINATE 50 MG/1
50 TABLET, EXTENDED RELEASE ORAL DAILY
Status: DISCONTINUED | OUTPATIENT
Start: 2021-11-01 | End: 2021-11-03 | Stop reason: HOSPADM

## 2021-10-31 RX ORDER — HYDRALAZINE HYDROCHLORIDE 25 MG/1
50 TABLET, FILM COATED ORAL 2 TIMES DAILY
Status: DISCONTINUED | OUTPATIENT
Start: 2021-10-31 | End: 2021-11-03 | Stop reason: HOSPADM

## 2021-10-31 RX ORDER — MAGNESIUM SULFATE 1 G/100ML
1 INJECTION INTRAVENOUS ONCE
Status: COMPLETED | OUTPATIENT
Start: 2021-10-31 | End: 2021-11-01

## 2021-10-31 RX ORDER — FUROSEMIDE 10 MG/ML
40 INJECTION INTRAMUSCULAR; INTRAVENOUS 2 TIMES DAILY
Status: DISCONTINUED | OUTPATIENT
Start: 2021-11-01 | End: 2021-11-01

## 2021-10-31 RX ORDER — ACETAMINOPHEN 325 MG/1
650 TABLET ORAL EVERY 6 HOURS PRN
Status: DISCONTINUED | OUTPATIENT
Start: 2021-10-31 | End: 2021-11-03 | Stop reason: HOSPADM

## 2021-10-31 RX ORDER — OMEGA-3S/DHA/EPA/FISH OIL/D3 300MG-1000
400 CAPSULE ORAL DAILY
Status: DISCONTINUED | OUTPATIENT
Start: 2021-11-01 | End: 2021-11-03 | Stop reason: HOSPADM

## 2021-10-31 RX ORDER — POTASSIUM CHLORIDE 20 MEQ/1
20 TABLET, EXTENDED RELEASE ORAL DAILY
Status: DISCONTINUED | OUTPATIENT
Start: 2021-11-01 | End: 2021-11-03 | Stop reason: HOSPADM

## 2021-10-31 RX ORDER — ALLOPURINOL 100 MG/1
100 TABLET ORAL DAILY
Status: DISCONTINUED | OUTPATIENT
Start: 2021-11-01 | End: 2021-11-03 | Stop reason: HOSPADM

## 2021-10-31 RX ORDER — FOLIC ACID 1 MG/1
1 TABLET ORAL DAILY
Status: DISCONTINUED | OUTPATIENT
Start: 2021-11-01 | End: 2021-11-03 | Stop reason: HOSPADM

## 2021-10-31 RX ADMIN — NYSTATIN 500000 UNITS: 100000 SUSPENSION ORAL at 23:40

## 2021-10-31 RX ADMIN — ALBUTEROL SULFATE 5 MG: 2.5 SOLUTION RESPIRATORY (INHALATION) at 20:41

## 2021-10-31 RX ADMIN — IPRATROPIUM BROMIDE 0.5 MG: 0.5 SOLUTION RESPIRATORY (INHALATION) at 20:41

## 2021-10-31 RX ADMIN — NITROGLYCERIN 1 INCH: 20 OINTMENT TOPICAL at 21:12

## 2021-10-31 RX ADMIN — POTASSIUM CHLORIDE 20 MEQ: 1500 TABLET, EXTENDED RELEASE ORAL at 23:39

## 2021-10-31 RX ADMIN — FUROSEMIDE 40 MG: 10 INJECTION, SOLUTION INTRAMUSCULAR; INTRAVENOUS at 21:12

## 2021-10-31 RX ADMIN — MAGNESIUM SULFATE IN DEXTROSE 1 G: 10 INJECTION, SOLUTION INTRAVENOUS at 23:40

## 2021-10-31 RX ADMIN — HYDRALAZINE HYDROCHLORIDE 50 MG: 25 TABLET, FILM COATED ORAL at 23:38

## 2021-11-01 LAB
ANION GAP SERPL CALCULATED.3IONS-SCNC: 9 MMOL/L (ref 4–13)
ATRIAL RATE: 105 BPM
BASOPHILS # BLD AUTO: 0.05 THOUSANDS/ΜL (ref 0–0.1)
BASOPHILS NFR BLD AUTO: 1 % (ref 0–1)
BUN SERPL-MCNC: 37 MG/DL (ref 5–25)
CALCIUM SERPL-MCNC: 8.5 MG/DL (ref 8.3–10.1)
CHLORIDE SERPL-SCNC: 110 MMOL/L (ref 100–108)
CO2 SERPL-SCNC: 27 MMOL/L (ref 21–32)
CREAT SERPL-MCNC: 2.18 MG/DL (ref 0.6–1.3)
EOSINOPHIL # BLD AUTO: 0.17 THOUSAND/ΜL (ref 0–0.61)
EOSINOPHIL NFR BLD AUTO: 2 % (ref 0–6)
ERYTHROCYTE [DISTWIDTH] IN BLOOD BY AUTOMATED COUNT: 15.5 % (ref 11.6–15.1)
GFR SERPL CREATININE-BSD FRML MDRD: 29 ML/MIN/1.73SQ M
GLUCOSE SERPL-MCNC: 124 MG/DL (ref 65–140)
GLUCOSE SERPL-MCNC: 93 MG/DL (ref 65–140)
GLUCOSE SERPL-MCNC: 95 MG/DL (ref 65–140)
GLUCOSE SERPL-MCNC: 98 MG/DL (ref 65–140)
GLUCOSE SERPL-MCNC: 98 MG/DL (ref 65–140)
HCT VFR BLD AUTO: 31.7 % (ref 36.5–49.3)
HGB BLD-MCNC: 9.7 G/DL (ref 12–17)
IMM GRANULOCYTES # BLD AUTO: 0.03 THOUSAND/UL (ref 0–0.2)
IMM GRANULOCYTES NFR BLD AUTO: 0 % (ref 0–2)
LYMPHOCYTES # BLD AUTO: 1.26 THOUSANDS/ΜL (ref 0.6–4.47)
LYMPHOCYTES NFR BLD AUTO: 17 % (ref 14–44)
MCH RBC QN AUTO: 28.9 PG (ref 26.8–34.3)
MCHC RBC AUTO-ENTMCNC: 30.6 G/DL (ref 31.4–37.4)
MCV RBC AUTO: 94 FL (ref 82–98)
MONOCYTES # BLD AUTO: 0.84 THOUSAND/ΜL (ref 0.17–1.22)
MONOCYTES NFR BLD AUTO: 12 % (ref 4–12)
NEUTROPHILS # BLD AUTO: 4.98 THOUSANDS/ΜL (ref 1.85–7.62)
NEUTS SEG NFR BLD AUTO: 68 % (ref 43–75)
NRBC BLD AUTO-RTO: 0 /100 WBCS
PLATELET # BLD AUTO: 203 THOUSANDS/UL (ref 149–390)
PMV BLD AUTO: 9.5 FL (ref 8.9–12.7)
POTASSIUM SERPL-SCNC: 4 MMOL/L (ref 3.5–5.3)
QRS AXIS: 98 DEGREES
QRSD INTERVAL: 100 MS
QT INTERVAL: 336 MS
QTC INTERVAL: 417 MS
RBC # BLD AUTO: 3.36 MILLION/UL (ref 3.88–5.62)
SODIUM SERPL-SCNC: 146 MMOL/L (ref 136–145)
T WAVE AXIS: 42 DEGREES
TROPONIN I SERPL-MCNC: 0.08 NG/ML
TROPONIN I SERPL-MCNC: 0.09 NG/ML
VENTRICULAR RATE: 93 BPM
WBC # BLD AUTO: 7.33 THOUSAND/UL (ref 4.31–10.16)

## 2021-11-01 PROCEDURE — 99222 1ST HOSP IP/OBS MODERATE 55: CPT | Performed by: INTERNAL MEDICINE

## 2021-11-01 PROCEDURE — 99232 SBSQ HOSP IP/OBS MODERATE 35: CPT | Performed by: STUDENT IN AN ORGANIZED HEALTH CARE EDUCATION/TRAINING PROGRAM

## 2021-11-01 PROCEDURE — 36415 COLL VENOUS BLD VENIPUNCTURE: CPT | Performed by: INTERNAL MEDICINE

## 2021-11-01 PROCEDURE — 94002 VENT MGMT INPAT INIT DAY: CPT

## 2021-11-01 PROCEDURE — 80048 BASIC METABOLIC PNL TOTAL CA: CPT | Performed by: INTERNAL MEDICINE

## 2021-11-01 PROCEDURE — 85025 COMPLETE CBC W/AUTO DIFF WBC: CPT | Performed by: INTERNAL MEDICINE

## 2021-11-01 PROCEDURE — 84484 ASSAY OF TROPONIN QUANT: CPT | Performed by: INTERNAL MEDICINE

## 2021-11-01 PROCEDURE — 93010 ELECTROCARDIOGRAM REPORT: CPT | Performed by: INTERNAL MEDICINE

## 2021-11-01 PROCEDURE — 82948 REAGENT STRIP/BLOOD GLUCOSE: CPT

## 2021-11-01 PROCEDURE — 94760 N-INVAS EAR/PLS OXIMETRY 1: CPT

## 2021-11-01 PROCEDURE — 94003 VENT MGMT INPAT SUBQ DAY: CPT

## 2021-11-01 RX ORDER — BUMETANIDE 0.25 MG/ML
2 INJECTION, SOLUTION INTRAMUSCULAR; INTRAVENOUS 2 TIMES DAILY
Status: DISCONTINUED | OUTPATIENT
Start: 2021-11-01 | End: 2021-11-02

## 2021-11-01 RX ADMIN — AMLODIPINE BESYLATE 5 MG: 5 TABLET ORAL at 09:24

## 2021-11-01 RX ADMIN — FOLIC ACID 1 MG: 1 TABLET ORAL at 09:24

## 2021-11-01 RX ADMIN — DABIGATRAN ETEXILATE MESYLATE 150 MG: 150 CAPSULE ORAL at 09:24

## 2021-11-01 RX ADMIN — FERROUS SULFATE TAB 325 MG (65 MG ELEMENTAL FE) 325 MG: 325 (65 FE) TAB at 09:24

## 2021-11-01 RX ADMIN — PRAVASTATIN SODIUM 20 MG: 20 TABLET ORAL at 17:58

## 2021-11-01 RX ADMIN — NYSTATIN 500000 UNITS: 100000 SUSPENSION ORAL at 09:23

## 2021-11-01 RX ADMIN — TAMSULOSIN HYDROCHLORIDE 0.8 MG: 0.4 CAPSULE ORAL at 17:58

## 2021-11-01 RX ADMIN — DABIGATRAN ETEXILATE MESYLATE 150 MG: 150 CAPSULE ORAL at 17:59

## 2021-11-01 RX ADMIN — CHOLECALCIFEROL TAB 10 MCG (400 UNIT) 400 UNITS: 10 TAB at 09:24

## 2021-11-01 RX ADMIN — ALLOPURINOL 100 MG: 100 TABLET ORAL at 09:25

## 2021-11-01 RX ADMIN — HYDRALAZINE HYDROCHLORIDE 50 MG: 25 TABLET, FILM COATED ORAL at 21:26

## 2021-11-01 RX ADMIN — ASPIRIN 81 MG CHEWABLE TABLET 81 MG: 81 TABLET CHEWABLE at 09:23

## 2021-11-01 RX ADMIN — BUDESONIDE AND FORMOTEROL FUMARATE DIHYDRATE 2 PUFF: 160; 4.5 AEROSOL RESPIRATORY (INHALATION) at 09:25

## 2021-11-01 RX ADMIN — METOPROLOL SUCCINATE 50 MG: 50 TABLET, EXTENDED RELEASE ORAL at 09:25

## 2021-11-01 RX ADMIN — FUROSEMIDE 40 MG: 10 INJECTION, SOLUTION INTRAMUSCULAR; INTRAVENOUS at 09:23

## 2021-11-01 RX ADMIN — NYSTATIN 500000 UNITS: 100000 SUSPENSION ORAL at 21:26

## 2021-11-01 RX ADMIN — LOSARTAN POTASSIUM 100 MG: 50 TABLET, FILM COATED ORAL at 09:24

## 2021-11-01 RX ADMIN — POTASSIUM CHLORIDE 20 MEQ: 1500 TABLET, EXTENDED RELEASE ORAL at 09:24

## 2021-11-01 RX ADMIN — NYSTATIN 500000 UNITS: 100000 SUSPENSION ORAL at 17:59

## 2021-11-01 RX ADMIN — BUDESONIDE AND FORMOTEROL FUMARATE DIHYDRATE 2 PUFF: 160; 4.5 AEROSOL RESPIRATORY (INHALATION) at 18:00

## 2021-11-01 RX ADMIN — BUMETANIDE 2 MG: 0.25 INJECTION, SOLUTION INTRAMUSCULAR; INTRAVENOUS at 18:00

## 2021-11-01 RX ADMIN — HYDRALAZINE HYDROCHLORIDE 50 MG: 25 TABLET, FILM COATED ORAL at 09:24

## 2021-11-01 RX ADMIN — PANTOPRAZOLE SODIUM 40 MG: 40 TABLET, DELAYED RELEASE ORAL at 09:24

## 2021-11-01 RX ADMIN — NYSTATIN 500000 UNITS: 100000 SUSPENSION ORAL at 12:46

## 2021-11-02 LAB
ANION GAP SERPL CALCULATED.3IONS-SCNC: 6 MMOL/L (ref 4–13)
ATRIAL RATE: 65 BPM
BUN SERPL-MCNC: 36 MG/DL (ref 5–25)
CALCIUM SERPL-MCNC: 8.9 MG/DL (ref 8.3–10.1)
CHLORIDE SERPL-SCNC: 106 MMOL/L (ref 100–108)
CO2 SERPL-SCNC: 29 MMOL/L (ref 21–32)
CREAT SERPL-MCNC: 2.15 MG/DL (ref 0.6–1.3)
ERYTHROCYTE [DISTWIDTH] IN BLOOD BY AUTOMATED COUNT: 15.8 % (ref 11.6–15.1)
GFR SERPL CREATININE-BSD FRML MDRD: 30 ML/MIN/1.73SQ M
GLUCOSE SERPL-MCNC: 117 MG/DL (ref 65–140)
GLUCOSE SERPL-MCNC: 118 MG/DL (ref 65–140)
GLUCOSE SERPL-MCNC: 143 MG/DL (ref 65–140)
GLUCOSE SERPL-MCNC: 89 MG/DL (ref 65–140)
GLUCOSE SERPL-MCNC: 98 MG/DL (ref 65–140)
HCT VFR BLD AUTO: 36.5 % (ref 36.5–49.3)
HGB BLD-MCNC: 11.3 G/DL (ref 12–17)
MAGNESIUM SERPL-MCNC: 2.1 MG/DL (ref 1.6–2.6)
MCH RBC QN AUTO: 29.2 PG (ref 26.8–34.3)
MCHC RBC AUTO-ENTMCNC: 31 G/DL (ref 31.4–37.4)
MCV RBC AUTO: 94 FL (ref 82–98)
PLATELET # BLD AUTO: 237 THOUSANDS/UL (ref 149–390)
PMV BLD AUTO: 9.2 FL (ref 8.9–12.7)
POTASSIUM SERPL-SCNC: 4 MMOL/L (ref 3.5–5.3)
QRS AXIS: 244 DEGREES
QRSD INTERVAL: 200 MS
QT INTERVAL: 496 MS
QTC INTERVAL: 535 MS
RBC # BLD AUTO: 3.87 MILLION/UL (ref 3.88–5.62)
SODIUM SERPL-SCNC: 141 MMOL/L (ref 136–145)
T WAVE AXIS: 74 DEGREES
VENTRICULAR RATE: 70 BPM
WBC # BLD AUTO: 8.17 THOUSAND/UL (ref 4.31–10.16)

## 2021-11-02 PROCEDURE — 93005 ELECTROCARDIOGRAM TRACING: CPT

## 2021-11-02 PROCEDURE — 82948 REAGENT STRIP/BLOOD GLUCOSE: CPT

## 2021-11-02 PROCEDURE — 94760 N-INVAS EAR/PLS OXIMETRY 1: CPT

## 2021-11-02 PROCEDURE — 80048 BASIC METABOLIC PNL TOTAL CA: CPT | Performed by: NURSE PRACTITIONER

## 2021-11-02 PROCEDURE — 97162 PT EVAL MOD COMPLEX 30 MIN: CPT

## 2021-11-02 PROCEDURE — 97165 OT EVAL LOW COMPLEX 30 MIN: CPT

## 2021-11-02 PROCEDURE — 85027 COMPLETE CBC AUTOMATED: CPT | Performed by: STUDENT IN AN ORGANIZED HEALTH CARE EDUCATION/TRAINING PROGRAM

## 2021-11-02 PROCEDURE — 83735 ASSAY OF MAGNESIUM: CPT | Performed by: INTERNAL MEDICINE

## 2021-11-02 PROCEDURE — 93010 ELECTROCARDIOGRAM REPORT: CPT | Performed by: SURGERY

## 2021-11-02 PROCEDURE — 99232 SBSQ HOSP IP/OBS MODERATE 35: CPT | Performed by: INTERNAL MEDICINE

## 2021-11-02 PROCEDURE — 99232 SBSQ HOSP IP/OBS MODERATE 35: CPT | Performed by: SURGERY

## 2021-11-02 RX ORDER — TORSEMIDE 20 MG/1
60 TABLET ORAL DAILY
Status: DISCONTINUED | OUTPATIENT
Start: 2021-11-03 | End: 2021-11-03 | Stop reason: HOSPADM

## 2021-11-02 RX ADMIN — NYSTATIN 500000 UNITS: 100000 SUSPENSION ORAL at 21:36

## 2021-11-02 RX ADMIN — TAMSULOSIN HYDROCHLORIDE 0.8 MG: 0.4 CAPSULE ORAL at 17:37

## 2021-11-02 RX ADMIN — BUDESONIDE AND FORMOTEROL FUMARATE DIHYDRATE 2 PUFF: 160; 4.5 AEROSOL RESPIRATORY (INHALATION) at 09:08

## 2021-11-02 RX ADMIN — BUDESONIDE AND FORMOTEROL FUMARATE DIHYDRATE 2 PUFF: 160; 4.5 AEROSOL RESPIRATORY (INHALATION) at 17:38

## 2021-11-02 RX ADMIN — POTASSIUM CHLORIDE 20 MEQ: 1500 TABLET, EXTENDED RELEASE ORAL at 09:04

## 2021-11-02 RX ADMIN — CHOLECALCIFEROL TAB 10 MCG (400 UNIT) 400 UNITS: 10 TAB at 09:06

## 2021-11-02 RX ADMIN — BUMETANIDE 2 MG: 0.25 INJECTION, SOLUTION INTRAMUSCULAR; INTRAVENOUS at 09:04

## 2021-11-02 RX ADMIN — NYSTATIN 500000 UNITS: 100000 SUSPENSION ORAL at 17:38

## 2021-11-02 RX ADMIN — FOLIC ACID 1 MG: 1 TABLET ORAL at 09:06

## 2021-11-02 RX ADMIN — PANTOPRAZOLE SODIUM 40 MG: 40 TABLET, DELAYED RELEASE ORAL at 09:03

## 2021-11-02 RX ADMIN — ASPIRIN 81 MG CHEWABLE TABLET 81 MG: 81 TABLET CHEWABLE at 09:03

## 2021-11-02 RX ADMIN — PRAVASTATIN SODIUM 20 MG: 20 TABLET ORAL at 17:38

## 2021-11-02 RX ADMIN — HYDRALAZINE HYDROCHLORIDE 50 MG: 25 TABLET, FILM COATED ORAL at 21:36

## 2021-11-02 RX ADMIN — FERROUS SULFATE TAB 325 MG (65 MG ELEMENTAL FE) 325 MG: 325 (65 FE) TAB at 09:03

## 2021-11-02 RX ADMIN — DABIGATRAN ETEXILATE MESYLATE 150 MG: 150 CAPSULE ORAL at 17:37

## 2021-11-02 RX ADMIN — DABIGATRAN ETEXILATE MESYLATE 150 MG: 150 CAPSULE ORAL at 09:06

## 2021-11-02 RX ADMIN — AMLODIPINE BESYLATE 5 MG: 5 TABLET ORAL at 09:03

## 2021-11-02 RX ADMIN — METOPROLOL SUCCINATE 50 MG: 50 TABLET, EXTENDED RELEASE ORAL at 09:03

## 2021-11-02 RX ADMIN — HYDRALAZINE HYDROCHLORIDE 50 MG: 25 TABLET, FILM COATED ORAL at 09:04

## 2021-11-02 RX ADMIN — NYSTATIN 500000 UNITS: 100000 SUSPENSION ORAL at 12:25

## 2021-11-02 RX ADMIN — ACETAMINOPHEN 650 MG: 325 TABLET, FILM COATED ORAL at 21:58

## 2021-11-02 RX ADMIN — ALLOPURINOL 100 MG: 100 TABLET ORAL at 09:06

## 2021-11-02 RX ADMIN — LOSARTAN POTASSIUM 100 MG: 50 TABLET, FILM COATED ORAL at 09:07

## 2021-11-02 RX ADMIN — NYSTATIN 500000 UNITS: 100000 SUSPENSION ORAL at 09:08

## 2021-11-03 VITALS
TEMPERATURE: 98.3 F | RESPIRATION RATE: 18 BRPM | DIASTOLIC BLOOD PRESSURE: 60 MMHG | WEIGHT: 250.3 LBS | BODY MASS INDEX: 35.04 KG/M2 | HEART RATE: 59 BPM | OXYGEN SATURATION: 90 % | SYSTOLIC BLOOD PRESSURE: 93 MMHG | HEIGHT: 71 IN

## 2021-11-03 LAB
ANION GAP SERPL CALCULATED.3IONS-SCNC: 12 MMOL/L (ref 4–13)
BASOPHILS # BLD AUTO: 0.02 THOUSANDS/ΜL (ref 0–0.1)
BASOPHILS NFR BLD AUTO: 0 % (ref 0–1)
BUN SERPL-MCNC: 40 MG/DL (ref 5–25)
CALCIUM SERPL-MCNC: 8.9 MG/DL (ref 8.3–10.1)
CHLORIDE SERPL-SCNC: 106 MMOL/L (ref 100–108)
CO2 SERPL-SCNC: 25 MMOL/L (ref 21–32)
CREAT SERPL-MCNC: 2.1 MG/DL (ref 0.6–1.3)
EOSINOPHIL # BLD AUTO: 0.31 THOUSAND/ΜL (ref 0–0.61)
EOSINOPHIL NFR BLD AUTO: 4 % (ref 0–6)
ERYTHROCYTE [DISTWIDTH] IN BLOOD BY AUTOMATED COUNT: 15.5 % (ref 11.6–15.1)
GFR SERPL CREATININE-BSD FRML MDRD: 31 ML/MIN/1.73SQ M
GLUCOSE SERPL-MCNC: 110 MG/DL (ref 65–140)
GLUCOSE SERPL-MCNC: 111 MG/DL (ref 65–140)
GLUCOSE SERPL-MCNC: 114 MG/DL (ref 65–140)
HCT VFR BLD AUTO: 37.7 % (ref 36.5–49.3)
HGB BLD-MCNC: 11.5 G/DL (ref 12–17)
IMM GRANULOCYTES # BLD AUTO: 0.03 THOUSAND/UL (ref 0–0.2)
IMM GRANULOCYTES NFR BLD AUTO: 0 % (ref 0–2)
LYMPHOCYTES # BLD AUTO: 1.34 THOUSANDS/ΜL (ref 0.6–4.47)
LYMPHOCYTES NFR BLD AUTO: 17 % (ref 14–44)
MAGNESIUM SERPL-MCNC: 2.1 MG/DL (ref 1.6–2.6)
MCH RBC QN AUTO: 28.5 PG (ref 26.8–34.3)
MCHC RBC AUTO-ENTMCNC: 30.5 G/DL (ref 31.4–37.4)
MCV RBC AUTO: 94 FL (ref 82–98)
MONOCYTES # BLD AUTO: 0.86 THOUSAND/ΜL (ref 0.17–1.22)
MONOCYTES NFR BLD AUTO: 11 % (ref 4–12)
NEUTROPHILS # BLD AUTO: 5.19 THOUSANDS/ΜL (ref 1.85–7.62)
NEUTS SEG NFR BLD AUTO: 68 % (ref 43–75)
PLATELET # BLD AUTO: 252 THOUSANDS/UL (ref 149–390)
PMV BLD AUTO: 9.6 FL (ref 8.9–12.7)
POTASSIUM SERPL-SCNC: 4.1 MMOL/L (ref 3.5–5.3)
RBC # BLD AUTO: 4.03 MILLION/UL (ref 3.88–5.62)
SODIUM SERPL-SCNC: 143 MMOL/L (ref 136–145)
WBC # BLD AUTO: 7.75 THOUSAND/UL (ref 4.31–10.16)

## 2021-11-03 PROCEDURE — 94760 N-INVAS EAR/PLS OXIMETRY 1: CPT

## 2021-11-03 PROCEDURE — 94660 CPAP INITIATION&MGMT: CPT

## 2021-11-03 PROCEDURE — 82948 REAGENT STRIP/BLOOD GLUCOSE: CPT

## 2021-11-03 PROCEDURE — 99232 SBSQ HOSP IP/OBS MODERATE 35: CPT | Performed by: INTERNAL MEDICINE

## 2021-11-03 PROCEDURE — 99239 HOSP IP/OBS DSCHRG MGMT >30: CPT | Performed by: PHYSICIAN ASSISTANT

## 2021-11-03 PROCEDURE — NC001 PR NO CHARGE: Performed by: PHYSICIAN ASSISTANT

## 2021-11-03 PROCEDURE — 83735 ASSAY OF MAGNESIUM: CPT | Performed by: INTERNAL MEDICINE

## 2021-11-03 PROCEDURE — 80048 BASIC METABOLIC PNL TOTAL CA: CPT | Performed by: NURSE PRACTITIONER

## 2021-11-03 PROCEDURE — 85025 COMPLETE CBC W/AUTO DIFF WBC: CPT | Performed by: INTERNAL MEDICINE

## 2021-11-03 RX ORDER — TORSEMIDE 20 MG/1
60 TABLET ORAL DAILY
Qty: 90 TABLET | Refills: 0 | Status: SHIPPED | OUTPATIENT
Start: 2021-11-04 | End: 2022-06-06

## 2021-11-03 RX ORDER — HYDRALAZINE HYDROCHLORIDE 50 MG/1
50 TABLET, FILM COATED ORAL 2 TIMES DAILY
Qty: 60 TABLET | Refills: 0 | Status: SHIPPED | OUTPATIENT
Start: 2021-11-03 | End: 2022-06-06

## 2021-11-03 RX ADMIN — METOPROLOL SUCCINATE 50 MG: 50 TABLET, EXTENDED RELEASE ORAL at 08:08

## 2021-11-03 RX ADMIN — FOLIC ACID 1 MG: 1 TABLET ORAL at 08:08

## 2021-11-03 RX ADMIN — LOSARTAN POTASSIUM 100 MG: 50 TABLET, FILM COATED ORAL at 08:07

## 2021-11-03 RX ADMIN — ALLOPURINOL 100 MG: 100 TABLET ORAL at 08:08

## 2021-11-03 RX ADMIN — NYSTATIN 500000 UNITS: 100000 SUSPENSION ORAL at 12:02

## 2021-11-03 RX ADMIN — HYDRALAZINE HYDROCHLORIDE 50 MG: 25 TABLET, FILM COATED ORAL at 08:08

## 2021-11-03 RX ADMIN — BUDESONIDE AND FORMOTEROL FUMARATE DIHYDRATE 2 PUFF: 160; 4.5 AEROSOL RESPIRATORY (INHALATION) at 08:11

## 2021-11-03 RX ADMIN — DABIGATRAN ETEXILATE MESYLATE 150 MG: 150 CAPSULE ORAL at 08:08

## 2021-11-03 RX ADMIN — TORSEMIDE 60 MG: 20 TABLET ORAL at 08:06

## 2021-11-03 RX ADMIN — PANTOPRAZOLE SODIUM 40 MG: 40 TABLET, DELAYED RELEASE ORAL at 08:06

## 2021-11-03 RX ADMIN — AMLODIPINE BESYLATE 5 MG: 5 TABLET ORAL at 08:08

## 2021-11-03 RX ADMIN — NYSTATIN 500000 UNITS: 100000 SUSPENSION ORAL at 08:08

## 2021-11-03 RX ADMIN — FERROUS SULFATE TAB 325 MG (65 MG ELEMENTAL FE) 325 MG: 325 (65 FE) TAB at 08:08

## 2021-11-03 RX ADMIN — ACETAMINOPHEN 650 MG: 325 TABLET, FILM COATED ORAL at 08:13

## 2021-11-03 RX ADMIN — CHOLECALCIFEROL TAB 10 MCG (400 UNIT) 400 UNITS: 10 TAB at 08:07

## 2021-11-03 RX ADMIN — ASPIRIN 81 MG CHEWABLE TABLET 81 MG: 81 TABLET CHEWABLE at 08:07

## 2021-11-03 RX ADMIN — POTASSIUM CHLORIDE 20 MEQ: 1500 TABLET, EXTENDED RELEASE ORAL at 08:08

## 2021-11-04 ENCOUNTER — TRANSITIONAL CARE MANAGEMENT (OUTPATIENT)
Dept: FAMILY MEDICINE CLINIC | Facility: CLINIC | Age: 71
End: 2021-11-04

## 2021-11-08 ENCOUNTER — OFFICE VISIT (OUTPATIENT)
Dept: FAMILY MEDICINE CLINIC | Facility: CLINIC | Age: 71
End: 2021-11-08
Payer: COMMERCIAL

## 2021-11-08 ENCOUNTER — APPOINTMENT (OUTPATIENT)
Dept: LAB | Facility: CLINIC | Age: 71
End: 2021-11-08
Payer: COMMERCIAL

## 2021-11-08 VITALS
RESPIRATION RATE: 18 BRPM | SYSTOLIC BLOOD PRESSURE: 126 MMHG | DIASTOLIC BLOOD PRESSURE: 84 MMHG | HEIGHT: 71 IN | BODY MASS INDEX: 36.2 KG/M2 | HEART RATE: 80 BPM | WEIGHT: 258.6 LBS | TEMPERATURE: 97 F | OXYGEN SATURATION: 96 %

## 2021-11-08 DIAGNOSIS — I47.1 PAT (PAROXYSMAL ATRIAL TACHYCARDIA) (HCC): ICD-10-CM

## 2021-11-08 DIAGNOSIS — Z76.89 ENCOUNTER FOR SUPPORT AND COORDINATION OF TRANSITION OF CARE: Primary | ICD-10-CM

## 2021-11-08 DIAGNOSIS — N18.32 STAGE 3B CHRONIC KIDNEY DISEASE (HCC): ICD-10-CM

## 2021-11-08 DIAGNOSIS — I50.43 ACUTE ON CHRONIC COMBINED SYSTOLIC AND DIASTOLIC CONGESTIVE HEART FAILURE (HCC): ICD-10-CM

## 2021-11-08 LAB
ANION GAP SERPL CALCULATED.3IONS-SCNC: 2 MMOL/L (ref 4–13)
BUN SERPL-MCNC: 39 MG/DL (ref 5–25)
CALCIUM SERPL-MCNC: 10 MG/DL (ref 8.3–10.1)
CHLORIDE SERPL-SCNC: 111 MMOL/L (ref 100–108)
CO2 SERPL-SCNC: 28 MMOL/L (ref 21–32)
CREAT SERPL-MCNC: 2.16 MG/DL (ref 0.6–1.3)
GFR SERPL CREATININE-BSD FRML MDRD: 30 ML/MIN/1.73SQ M
GLUCOSE SERPL-MCNC: 52 MG/DL (ref 65–140)
POTASSIUM SERPL-SCNC: 3.9 MMOL/L (ref 3.5–5.3)
SODIUM SERPL-SCNC: 141 MMOL/L (ref 136–145)

## 2021-11-08 PROCEDURE — 36415 COLL VENOUS BLD VENIPUNCTURE: CPT

## 2021-11-08 PROCEDURE — 1111F DSCHRG MED/CURRENT MED MERGE: CPT | Performed by: NURSE PRACTITIONER

## 2021-11-08 PROCEDURE — 99496 TRANSJ CARE MGMT HIGH F2F 7D: CPT | Performed by: NURSE PRACTITIONER

## 2021-11-08 PROCEDURE — 80048 BASIC METABOLIC PNL TOTAL CA: CPT

## 2021-12-01 ENCOUNTER — OFFICE VISIT (OUTPATIENT)
Dept: URGENT CARE | Facility: CLINIC | Age: 71
End: 2021-12-01
Payer: COMMERCIAL

## 2021-12-01 VITALS — RESPIRATION RATE: 16 BRPM | OXYGEN SATURATION: 97 % | TEMPERATURE: 98.1 F | HEART RATE: 52 BPM

## 2021-12-01 DIAGNOSIS — R05.9 COUGH: Primary | ICD-10-CM

## 2021-12-01 PROCEDURE — G0463 HOSPITAL OUTPT CLINIC VISIT: HCPCS | Performed by: PHYSICIAN ASSISTANT

## 2021-12-01 PROCEDURE — 99213 OFFICE O/P EST LOW 20 MIN: CPT | Performed by: PHYSICIAN ASSISTANT

## 2021-12-01 RX ORDER — BENZONATATE 200 MG/1
200 CAPSULE ORAL 3 TIMES DAILY PRN
Qty: 20 CAPSULE | Refills: 0 | Status: SHIPPED | OUTPATIENT
Start: 2021-12-01 | End: 2022-06-06 | Stop reason: ALTCHOICE

## 2021-12-09 ENCOUNTER — OFFICE VISIT (OUTPATIENT)
Dept: FAMILY MEDICINE CLINIC | Facility: CLINIC | Age: 71
End: 2021-12-09
Payer: COMMERCIAL

## 2021-12-09 VITALS
RESPIRATION RATE: 16 BRPM | HEIGHT: 71 IN | OXYGEN SATURATION: 98 % | TEMPERATURE: 97.1 F | WEIGHT: 254 LBS | SYSTOLIC BLOOD PRESSURE: 120 MMHG | BODY MASS INDEX: 35.56 KG/M2 | HEART RATE: 79 BPM | DIASTOLIC BLOOD PRESSURE: 84 MMHG

## 2021-12-09 DIAGNOSIS — E11.9 TYPE 2 DIABETES MELLITUS WITHOUT COMPLICATION, WITHOUT LONG-TERM CURRENT USE OF INSULIN (HCC): Primary | ICD-10-CM

## 2021-12-09 DIAGNOSIS — I50.43 ACUTE ON CHRONIC COMBINED SYSTOLIC AND DIASTOLIC CONGESTIVE HEART FAILURE (HCC): ICD-10-CM

## 2021-12-09 DIAGNOSIS — Z95.0 PACEMAKER: ICD-10-CM

## 2021-12-09 DIAGNOSIS — I21.4 NSTEMI (NON-ST ELEVATED MYOCARDIAL INFARCTION) (HCC): ICD-10-CM

## 2021-12-09 DIAGNOSIS — R35.0 FREQUENCY OF URINATION: ICD-10-CM

## 2021-12-09 DIAGNOSIS — N18.32 STAGE 3B CHRONIC KIDNEY DISEASE (HCC): ICD-10-CM

## 2021-12-09 DIAGNOSIS — I48.0 PAROXYSMAL ATRIAL FIBRILLATION (HCC): ICD-10-CM

## 2021-12-09 DIAGNOSIS — I10 ESSENTIAL HYPERTENSION: ICD-10-CM

## 2021-12-09 DIAGNOSIS — J42 CHRONIC BRONCHITIS, UNSPECIFIED CHRONIC BRONCHITIS TYPE (HCC): ICD-10-CM

## 2021-12-09 LAB
SL AMB  POCT GLUCOSE, UA: NORMAL
SL AMB LEUKOCYTE ESTERASE,UA: NORMAL
SL AMB POCT BILIRUBIN,UA: NORMAL
SL AMB POCT BLOOD,UA: NORMAL
SL AMB POCT CLARITY,UA: CLEAR
SL AMB POCT COLOR,UA: YELLOW
SL AMB POCT KETONES,UA: NORMAL
SL AMB POCT NITRITE,UA: NORMAL
SL AMB POCT PH,UA: 5
SL AMB POCT SPECIFIC GRAVITY,UA: NORMAL
SL AMB POCT URINE PROTEIN: NORMAL
SL AMB POCT UROBILINOGEN: NORMAL

## 2021-12-09 PROCEDURE — 3725F SCREEN DEPRESSION PERFORMED: CPT | Performed by: FAMILY MEDICINE

## 2021-12-09 PROCEDURE — 1160F RVW MEDS BY RX/DR IN RCRD: CPT | Performed by: FAMILY MEDICINE

## 2021-12-09 PROCEDURE — 81002 URINALYSIS NONAUTO W/O SCOPE: CPT | Performed by: FAMILY MEDICINE

## 2021-12-09 PROCEDURE — 3061F NEG MICROALBUMINURIA REV: CPT | Performed by: FAMILY MEDICINE

## 2021-12-09 PROCEDURE — 1036F TOBACCO NON-USER: CPT | Performed by: FAMILY MEDICINE

## 2021-12-09 PROCEDURE — 99214 OFFICE O/P EST MOD 30 MIN: CPT | Performed by: FAMILY MEDICINE

## 2022-05-27 ENCOUNTER — RA CDI HCC (OUTPATIENT)
Dept: OTHER | Facility: HOSPITAL | Age: 72
End: 2022-05-27

## 2022-05-27 NOTE — PROGRESS NOTES
[facility]  coding opportunities          Chart Reviewed number of suggestions sent to Provider: 1    I13 [id number]     Patients Insurance     Medicare Insurance: [facility] Advantage

## 2022-06-06 ENCOUNTER — APPOINTMENT (OUTPATIENT)
Dept: LAB | Facility: CLINIC | Age: 72
End: 2022-06-06
Payer: COMMERCIAL

## 2022-06-06 ENCOUNTER — OFFICE VISIT (OUTPATIENT)
Dept: FAMILY MEDICINE CLINIC | Facility: CLINIC | Age: 72
End: 2022-06-06
Payer: COMMERCIAL

## 2022-06-06 VITALS
OXYGEN SATURATION: 98 % | HEART RATE: 73 BPM | HEIGHT: 69 IN | SYSTOLIC BLOOD PRESSURE: 118 MMHG | DIASTOLIC BLOOD PRESSURE: 82 MMHG | RESPIRATION RATE: 20 BRPM | BODY MASS INDEX: 37.03 KG/M2 | WEIGHT: 250 LBS | TEMPERATURE: 97.4 F

## 2022-06-06 DIAGNOSIS — J42 CHRONIC BRONCHITIS, UNSPECIFIED CHRONIC BRONCHITIS TYPE (HCC): ICD-10-CM

## 2022-06-06 DIAGNOSIS — I48.0 PAROXYSMAL ATRIAL FIBRILLATION (HCC): ICD-10-CM

## 2022-06-06 DIAGNOSIS — R35.0 FREQUENT URINATION: ICD-10-CM

## 2022-06-06 DIAGNOSIS — E66.01 OBESITY, MORBID (HCC): ICD-10-CM

## 2022-06-06 DIAGNOSIS — Z00.00 MEDICARE ANNUAL WELLNESS VISIT, INITIAL: ICD-10-CM

## 2022-06-06 DIAGNOSIS — I50.43 ACUTE ON CHRONIC COMBINED SYSTOLIC AND DIASTOLIC CONGESTIVE HEART FAILURE (HCC): ICD-10-CM

## 2022-06-06 DIAGNOSIS — G63 POLYNEUROPATHY ASSOCIATED WITH UNDERLYING DISEASE (HCC): ICD-10-CM

## 2022-06-06 DIAGNOSIS — N18.4 STAGE 4 CHRONIC KIDNEY DISEASE DUE TO TYPE 2 DIABETES MELLITUS (HCC): ICD-10-CM

## 2022-06-06 DIAGNOSIS — I77.810 ASCENDING AORTA DILATATION (HCC): ICD-10-CM

## 2022-06-06 DIAGNOSIS — E11.22 STAGE 4 CHRONIC KIDNEY DISEASE DUE TO TYPE 2 DIABETES MELLITUS (HCC): ICD-10-CM

## 2022-06-06 DIAGNOSIS — N25.81 SECONDARY HYPERPARATHYROIDISM (HCC): ICD-10-CM

## 2022-06-06 DIAGNOSIS — E11.9 TYPE 2 DIABETES MELLITUS WITHOUT COMPLICATION, WITHOUT LONG-TERM CURRENT USE OF INSULIN (HCC): Primary | ICD-10-CM

## 2022-06-06 DIAGNOSIS — R35.0 FREQUENCY OF URINATION: Primary | ICD-10-CM

## 2022-06-06 PROCEDURE — 3288F FALL RISK ASSESSMENT DOCD: CPT | Performed by: NURSE PRACTITIONER

## 2022-06-06 PROCEDURE — 1036F TOBACCO NON-USER: CPT | Performed by: NURSE PRACTITIONER

## 2022-06-06 PROCEDURE — 1160F RVW MEDS BY RX/DR IN RCRD: CPT | Performed by: NURSE PRACTITIONER

## 2022-06-06 PROCEDURE — 1101F PT FALLS ASSESS-DOCD LE1/YR: CPT | Performed by: NURSE PRACTITIONER

## 2022-06-06 PROCEDURE — G0439 PPPS, SUBSEQ VISIT: HCPCS | Performed by: NURSE PRACTITIONER

## 2022-06-06 PROCEDURE — 36415 COLL VENOUS BLD VENIPUNCTURE: CPT

## 2022-06-06 PROCEDURE — 1125F AMNT PAIN NOTED PAIN PRSNT: CPT | Performed by: NURSE PRACTITIONER

## 2022-06-06 PROCEDURE — 3008F BODY MASS INDEX DOCD: CPT | Performed by: NURSE PRACTITIONER

## 2022-06-06 PROCEDURE — 84153 ASSAY OF PSA TOTAL: CPT

## 2022-06-06 PROCEDURE — 3725F SCREEN DEPRESSION PERFORMED: CPT | Performed by: NURSE PRACTITIONER

## 2022-06-06 PROCEDURE — 3066F NEPHROPATHY DOC TX: CPT | Performed by: NURSE PRACTITIONER

## 2022-06-06 PROCEDURE — 1170F FXNL STATUS ASSESSED: CPT | Performed by: NURSE PRACTITIONER

## 2022-06-06 NOTE — PROGRESS NOTES
Assessment and Plan:     Problem List Items Addressed This Visit        Endocrine    Type 2 diabetes mellitus, without long-term current use of insulin ([place] [address] ) - Primary    Relevant Orders    POCT hemoglobin A1c    Stage 4 chronic kidney disease due to type 2 diabetes mellitus (HCC)    Secondary hyperparathyroidism (HCC)       Respiratory    Chronic obstructive pulmonary disease (COPD) (HCC)    Relevant Medications    fluticasone-salmeterol (Advair) 250-50 mcg/dose inhaler       Cardiovascular and Mediastinum    Paroxysmal atrial fibrillation (HCC)    Acute on chronic congestive heart failure (HCC)    Ascending aorta dilatation (HCC)       Nervous and Auditory    Polyneuropathy associated with underlying disease ([facility] )       Other    Obesity, morbid ([place] [address] )      Other Visit Diagnoses     Medicare annual wellness visit, initial            BMI Counseling: Body mass index is 37 24 kg/m²  The BMI is above normal  Nutrition recommendations include decreasing portion sizes, encouraging healthy choices of fruits and vegetables, decreasing fast food intake, consuming healthier snacks, limiting drinks that contain sugar, moderation in carbohydrate intake, increasing intake of lean protein, reducing intake of saturated and trans fat and reducing intake of cholesterol  Exercise recommendations include moderate physical activity 150 minutes/week and strength training exercises  Rationale for BMI follow-up plan is due to patient being overweight or obese  Depression Screening and Follow-up Plan: Patient was screened for depression during today's encounter  They screened negative with a PHQ-2 score of 0  Falls Plan of Care: balance, strength, and gait training instructions were provided  Preventive health issues were discussed with patient, and age appropriate screening tests were ordered as noted in patient's After Visit Summary    Personalized health advice and appropriate referrals for health education or preventive services given if needed, as noted in patient's After Visit Summary  History of Present Illness:     Patient presents for Medicare Annual Wellness visit    Patient Care Team:  Cheo Dempsey as PCP - General (Family Medicine)     Problem List:     Patient Active Problem List   Diagnosis    Chronic obstructive pulmonary disease (COPD) ([place] [address] )    Right sided weakness    Hypertensive emergency    Paroxysmal atrial fibrillation ([place] [address] )    Type 2 diabetes mellitus, without long-term current use of insulin ([place] [address] )    NSTEMI (non-ST elevated myocardial infarction) ([place] [address] )    Essential hypertension    Acute on chronic congestive heart failure (HCC)    Stage 3b chronic kidney disease ([place] [address] )    Obstructive sleep apnea    Chest pain    Hypoxia    Pacemaker    BMI 35 0-35 9,adult    Polyneuropathy associated with underlying disease ([place] [address] )    Stage 4 chronic kidney disease due to type 2 diabetes mellitus ([place] [address] )    [facility]ending [facility] dilatation ([facility])    Obesity, morbid ([place] [address] )    Secondary hyperparathyroidism ([place] [address] )      Past Medical and Surgical History:     Past Medical History:   Diagnosis Date    A-fib ([place] [address] )     CHF (congestive heart failure) ([place] [address] )     COPD (chronic obstructive pulmonary disease) ([place] [address] )     Diabetes mellitus ([place] [address] )     Enlarged prostate     Gout     Hyperlipidemia     Hypertension      Past Surgical History:   Procedure Laterality Date    EAR SURGERY      FRACTURE SURGERY        Family History:     No family history on file     Social History:     Social History     Socioeconomic History    Marital status: /Civil Union     Spouse name: None    Number of children: None    Years of education: None    Highest education level: None   Occupational History    None   Tobacco Use    Smoking status: Former Smoker     Packs/day: 0 25     Types: Pipe     Start date: 80     Quit date: 3/26/2016     Years since quittin 2    Smokeless tobacco: Never Used   Vaping Use    Vaping Use: Never used   Substance and Sexual Activity    Alcohol use: Yes     Comment: occassional    Drug use: No    Sexual activity: None   Other Topics Concern    None   Social History Narrative    None     Social Determinants of Health     Financial Resource Strain: Not on file   Food Insecurity: Not on file   Transportation Needs: Not on file   Physical Activity: Inactive    Days of Exercise per Week: 0 days    Minutes of Exercise per Session: 0 min   Stress: No Stress Concern Present    Feeling of Stress : Not at all   Social Connections: Not on file   Intimate Partner Violence: Not At Risk    Fear of Current or Ex-Partner: No    Emotionally Abused: No    Physically Abused: No    Sexually Abused: No   Housing Stability: Not on file      Medications and Allergies:     Current Outpatient Medications   Medication Sig Dispense Refill    albuterol (PROVENTIL HFA,VENTOLIN HFA) 90 mcg/act inhaler INHALE 2 PUFFS BY MOUTH FOUR TIMES A DAY AS NEEDED FOR BREATHING      allopurinol (ZYLOPRIM) 100 mg tablet Take 100 mg by mouth daily        amLODIPine (NORVASC) 10 mg tablet Take 5 mg by mouth daily Patient cuts 10mg pill in half  5mg      apixaban (Eliquis) 5 mg Take 5 mg by mouth 2 (two) times a day      aspirin 81 mg chewable tablet Chew 81 mg daily      cholecalciferol (VITAMIN D3) 400 units tablet Take 400 Units by mouth daily      ferrous sulfate 325 (65 Fe) mg tablet Take 325 mg by mouth daily with breakfast      fluticasone-salmeterol (Advair) 250-50 mcg/dose inhaler Inhale 1 puff 2 (two) times a day Rinse mouth after use        folic acid (FOLVITE) 1 mg tablet Take 1 mg by mouth daily       hydrALAZINE (APRESOLINE) 50 mg tablet Take 1 tablet (50 mg total) by mouth 2 (two) times a day 60 tablet 0    losartan (COZAAR) 50 mg tablet Take 100 mg by mouth daily       LUTEIN PO Take by mouth      metoprolol succinate (TOPROL-XL) 50 mg 24 hr tablet Take 50 mg by mouth daily      multivitamin (THERAGRAN) TABS Take 1 tablet by mouth daily      Nutritional Supplements (VITAMIN D BOOSTER PO) Take 2,000 Units by mouth       pantoprazole (PROTONIX) 40 mg tablet Take 40 mg by mouth daily      potassium chloride (K-DUR,KLOR-CON) 20 mEq tablet Take 20 mEq by mouth daily      simvastatin (ZOCOR) 10 mg tablet Take 10 mg by mouth daily at bedtime      tamsulosin (FLOMAX) 0 4 mg Take 0 8 mg by mouth daily with dinner      torsemide (DEMADEX) 20 mg tablet Take 3 tablets (60 mg total) by mouth daily 90 tablet 0     No current facility-administered medications for this visit  Allergies   Allergen Reactions    Bee Venom Anaphylaxis    Enalapril Other (See Comments)     unknown    Morphine GI Intolerance      Immunizations:     Immunization History   Administered Date(s) Administered    COVID-19 MODERNA VACC 0 5 ML IM 02/25/2021, 03/25/2021, 12/13/2021    INFLUENZA 11/14/2010, 11/01/2011, 11/08/2012, 11/08/2013, 10/30/2014, 06/25/2018, 10/01/2019    Influenza Quadrivalent Preservative Free 3 years and older IM 10/22/2018, 09/20/2019, 10/01/2020    Influenza, Seasonal Vaccine, Quadrivalent, Adjuvanted,   5e 10/05/2021    Influenza, seasonal, injectable, preservative free 10/20/2015, 11/18/2016, 09/18/2017    Pneumococcal 12/22/2010    Pneumococcal Conjugate 13-Valent 05/17/2016    Pneumococcal Polysaccharide PPV23 07/25/2016    Tdap 03/31/2014    Zoster 02/19/2015    Zoster Vaccine Recombinant 10/19/2020, 01/19/2021      Health Maintenance:         Topic Date Due    Hepatitis C Screening  Never done    Colorectal Cancer Screening  12/02/2024         Topic Date Due    COVID-19 Vaccine (4 - Booster for Moderna series) 04/13/2022    Influenza Vaccine (Season Ended) 09/01/2022      Medicare Health Risk Assessment:     /82 (BP Location: Right arm, Patient Position: Sitting, Cuff Size: Standard)   Pulse 73   Temp (!) 97 4 °F (36 3 °C) (Tympanic)   Resp 20   Ht 5' 8 7" (1 745 m)   Wt 113 kg (250 lb)   SpO2 98%   BMI 37 24 kg/m²      Susan Saucedo is here for his Subsequent Wellness visit  Health Risk Assessment:   Patient rates overall health as fair  Patient feels that their physical health rating is slightly worse  Patient is satisfied with their life  Eyesight was rated as same  Hearing was rated as slightly worse  Patient feels that their emotional and mental health rating is same  Patients states they are never, rarely angry  Patient states they are sometimes unusually tired/fatigued  Pain experienced in the last 7 days has been some  Patient's pain rating has been 5/10  Patient states that he has experienced no weight loss or gain in last 6 months  Depression Screening:   PHQ-2 Score: 0      Fall Risk Screening: In the past year, patient has experienced: no history of falling in past year      Home Safety:  Patient has trouble with stairs inside or outside of their home  Patient has working smoke alarms and has no working carbon monoxide detector  Home safety hazards include: none  Nutrition:   Current diet is Regular  Medications:   Patient is not currently taking any over-the-counter supplements  Patient is able to manage medications  Activities of Daily Living (ADLs)/Instrumental Activities of Daily Living (IADLs):   Walk and transfer into and out of bed and chair?: Yes  Dress and groom yourself?: Yes    Bathe or shower yourself?: Yes    Feed yourself?  Yes  Do your laundry/housekeeping?: Yes  Manage your money, pay your bills and track your expenses?: Yes  Make your own meals?: Yes    Do your own shopping?: Yes    Advance Care Planning:   Living will: No    Durable POA for healthcare: No    Advanced directive: No      Cognitive Screening:   Provider or family/friend/caregiver concerned regarding cognition?: No    PREVENTIVE SCREENINGS      Cardiovascular Screening:    General: Screening Current      Diabetes Screening:     General: Screening Not Indicated and History Diabetes      Colorectal Cancer Screening:     General: Screening Current      Prostate Cancer Screening:    General: Risks and Benefits Discussed    Due for: PSA      Osteoporosis Screening:    General: Risks and Benefits Discussed      Abdominal Aortic Aneurysm (AAA) Screening:    Risk factors include: age between 73-67 yo and tobacco use        Lung Cancer Screening:     General: Screening Not Indicated      Hepatitis C Screening:    General: Screening Current    Screening, Brief Intervention, and Referral to Treatment (SBIRT)    Screening  Typical number of drinks in a day: 0  Typical number of drinks in a week: 3  Interpretation: Low risk drinking behavior  Other Counseling Topics:   Car/seat belt/driving safety and sunscreen  Physical Exam  Vitals and nursing note reviewed  Constitutional:       Appearance: He is not ill-appearing  HENT:      Head: Normocephalic and atraumatic  Right Ear: Tympanic membrane, ear canal and external ear normal  There is no impacted cerumen  Left Ear: Tympanic membrane, ear canal and external ear normal  There is no impacted cerumen  Nose: Nose normal  No congestion  Mouth/Throat:      Mouth: Mucous membranes are moist       Pharynx: Oropharynx is clear  No oropharyngeal exudate or posterior oropharyngeal erythema  Eyes:      General:         Right eye: No discharge  Left eye: No discharge  Extraocular Movements: Extraocular movements intact  Conjunctiva/sclera: Conjunctivae normal    Cardiovascular:      Rate and Rhythm: Normal rate and regular rhythm  Heart sounds: Murmur (1/6 systolic) heard  Pulmonary:      Effort: Pulmonary effort is normal  No respiratory distress  Breath sounds: Normal breath sounds  No rhonchi or rales  Chest:      Chest wall: No tenderness  Abdominal:      General: Bowel sounds are normal       Palpations: Abdomen is soft  Tenderness: There is no right CVA tenderness or left CVA tenderness     Musculoskeletal: General: Tenderness (right knee) present  Cervical back: Normal range of motion  No rigidity or tenderness  Right lower leg: Edema (trace) present  Left lower leg: Edema (trace) present  Lymphadenopathy:      Cervical: No cervical adenopathy  Skin:     General: Skin is warm and dry  Capillary Refill: Capillary refill takes less than 2 seconds  Neurological:      Mental Status: He is alert and oriented to person, place, and time  Psychiatric:         Mood and Affect: Mood normal          Behavior: Behavior normal          Thought Content:  Thought content normal          Judgment: Judgment normal          ROBER Saha

## 2022-06-06 NOTE — PATIENT INSTRUCTIONS

## 2022-06-07 LAB — PSA SERPL DL<=0.01 NG/ML-MCNC: 1.33 NG/ML (ref 0–4)

## 2022-09-17 ENCOUNTER — OFFICE VISIT (OUTPATIENT)
Dept: URGENT CARE | Facility: CLINIC | Age: 72
End: 2022-09-17
Payer: COMMERCIAL

## 2022-09-17 VITALS
DIASTOLIC BLOOD PRESSURE: 74 MMHG | BODY MASS INDEX: 34.16 KG/M2 | SYSTOLIC BLOOD PRESSURE: 125 MMHG | HEIGHT: 71 IN | RESPIRATION RATE: 20 BRPM | OXYGEN SATURATION: 96 % | WEIGHT: 244 LBS | HEART RATE: 76 BPM | TEMPERATURE: 97.9 F

## 2022-09-17 DIAGNOSIS — H65.03 BILATERAL ACUTE SEROUS OTITIS MEDIA, RECURRENCE NOT SPECIFIED: Primary | ICD-10-CM

## 2022-09-17 DIAGNOSIS — R09.81 NASAL CONGESTION: ICD-10-CM

## 2022-09-17 PROCEDURE — 99213 OFFICE O/P EST LOW 20 MIN: CPT | Performed by: PHYSICIAN ASSISTANT

## 2022-09-17 PROCEDURE — 87811 SARS-COV-2 COVID19 W/OPTIC: CPT | Performed by: PHYSICIAN ASSISTANT

## 2022-09-17 PROCEDURE — G0463 HOSPITAL OUTPT CLINIC VISIT: HCPCS | Performed by: PHYSICIAN ASSISTANT

## 2022-09-17 RX ORDER — AZITHROMYCIN 250 MG/1
TABLET, FILM COATED ORAL
Qty: 6 TABLET | Refills: 0 | Status: SHIPPED | OUTPATIENT
Start: 2022-09-17 | End: 2022-09-21

## 2022-09-17 RX ORDER — METHYLPREDNISOLONE 4 MG/1
TABLET ORAL
Qty: 21 TABLET | Refills: 0 | Status: SHIPPED | OUTPATIENT
Start: 2022-09-17

## 2022-09-17 NOTE — PROGRESS NOTES
[facility]        NAME: So Salvador is a 66 y o  male  : 1950    MRN: [id number]  DATE: 2022  TIME: 4:56 PM    Assessment and Plan   Nasal congestion [R09 81]  1  Nasal congestion  Poct Covid 19 Rapid Antigen Test         Patient Instructions       Follow up with PCP in 3-5 days  Proceed to  ER if symptoms worsen  Chief Complaint     Chief Complaint   Patient presents with    Nasal Congestion     Nasal congestion x a few weeks         History of Present Illness       Pt uses a CPAP  Pt takes eliquis  No longer takes DM medications due to controlled BS with diet after losing weight  Pt had a new pacemaker placed 3 weeks ago  Pt took Mucinex D which didn't help and has tried different nasal sprays        Review of Systems   Review of Systems      Current Medications       Current Outpatient Medications:     albuterol (PROVENTIL HFA,VENTOLIN HFA) 90 mcg/act inhaler, INHALE 2 PUFFS BY MOUTH FOUR TIMES A DAY AS NEEDED FOR BREATHING, Disp: , Rfl:     allopurinol (ZYLOPRIM) 100 mg tablet, Take 100 mg by mouth daily  , Disp: , Rfl:     amLODIPine (NORVASC) 10 mg tablet, Take 5 mg by mouth daily Patient cuts 10mg pill in half  5mg, Disp: , Rfl:     apixaban (ELIQUIS) 5 mg, Take 5 mg by mouth 2 (two) times a day, Disp: , Rfl:     aspirin 81 mg chewable tablet, Chew 81 mg daily, Disp: , Rfl:     cholecalciferol (VITAMIN D3) 400 units tablet, Take 400 Units by mouth daily, Disp: , Rfl:     ferrous sulfate 325 (65 Fe) mg tablet, Take 325 mg by mouth daily with breakfast, Disp: , Rfl:     fluticasone-salmeterol (Advair) 250-50 mcg/dose inhaler, Inhale 1 puff 2 (two) times a day Rinse mouth after use , Disp: , Rfl:     folic acid (FOLVITE) 1 mg tablet, Take 1 mg by mouth daily , Disp: , Rfl:     losartan (COZAAR) 50 mg tablet, Take 100 mg by mouth daily , Disp: , Rfl:     LUTEIN PO, Take by mouth, Disp: , Rfl:     metoprolol succinate (TOPROL-XL) 50 mg 24 hr tablet, Take 50 mg by mouth daily, Disp: , Rfl:     multivitamin (THERAGRAN) TABS, Take 1 tablet by mouth daily, Disp: , Rfl:     Nutritional Supplements (VITAMIN D BOOSTER PO), Take 2,000 Units by mouth , Disp: , Rfl:     pantoprazole (PROTONIX) 40 mg tablet, Take 40 mg by mouth daily, Disp: , Rfl:     potassium chloride (K-DUR,KLOR-CON) 20 mEq tablet, Take 20 mEq by mouth daily, Disp: , Rfl:     simvastatin (ZOCOR) 10 mg tablet, Take 10 mg by mouth daily at bedtime, Disp: , Rfl:     tamsulosin (FLOMAX) 0 4 mg, Take 0 8 mg by mouth daily with dinner, Disp: , Rfl:     torsemide (DEMADEX) 20 mg tablet, Take 3 tablets (60 mg total) by mouth daily, Disp: 90 tablet, Rfl: 0    hydrALAZINE (APRESOLINE) 50 mg tablet, Take 1 tablet (50 mg total) by mouth 2 (two) times a day, Disp: 60 tablet, Rfl: 0    Current Allergies     Allergies as of 09/17/2022 - Reviewed 09/17/2022   Allergen Reaction Noted    Bee venom Anaphylaxis 04/08/2017    Enalapril Other (See Comments) 07/25/2016    Morphine GI Intolerance 08/26/2015            The following portions of the patient's history were reviewed and updated as appropriate: allergies, current medications, past family history, past medical history, past social history, past surgical history and problem list      Past Medical History:   Diagnosis Date    A-fib [facility])     CHF (congestive heart failure) ([facility] )     COPD (chronic obstructive pulmonary disease) ([facility] )     Diabetes mellitus ([facility] )     Enlarged prostate     Gout     Hyperlipidemia     Hypertension        Past Surgical History:   Procedure Laterality Date    A-V CARDIAC PACEMAKER INSERTION  08/2022    EAR SURGERY      FRACTURE SURGERY         Family History   Problem Relation Age of Onset    Dementia Mother     Cancer Father          Medications have been verified          Objective   /74   Pulse 76   Temp 97 9 °F (36 6 °C)   Resp 20   Ht 5' 11" (1 803 m)   Wt 111 kg (244 lb)   SpO2 96%   BMI 34 03 kg/m²   No LMP for male patient         Physical Exam     Physical Exam

## 2022-09-17 NOTE — PATIENT INSTRUCTIONS
Fluid In The Ear (Serous Otitis Media)   AMBULATORY CARE:   Serous otitis media [facility])  is fluid trapped in the middle of your ear behind your eardrum  This condition usually develops without signs or symptoms of an ear infection  Serous otitis media may be caused by an upper respiratory infection or allergies  It is most common in the fall and early spring  Signs and symptoms:   Trouble hearing    Sounds are muffled    Plugged ear or an ear that feels full    Ear discomfort or popping    Ringing or buzzing in your ear    Call your doctor if:   You develop severe ear pain  The outside of your ear is red or swollen  You have fluid coming from your ear  You have questions or concerns about your condition or care  Medicines: You may need any of the following:  Acetaminophen  decreases pain and fever  It is available without a doctor's order  Ask how much to take and how often to take it  Follow directions  Read the labels of all other medicines you are using to see if they also contain acetaminophen, or ask your doctor or pharmacist  Acetaminophen can cause liver damage if not taken correctly  Do not use more than 4 grams (4,000 milligrams) total of acetaminophen in one day  NSAIDs , such as ibuprofen, help decrease swelling, pain, and fever  This medicine is available with or without a doctor's order  NSAIDs can cause stomach bleeding or kidney problems in certain people  If you take blood thinner medicine, always ask your healthcare provider if NSAIDs are safe for you  Always read the medicine label and follow directions  Steroids  help decrease inflammation so the fluid can drain from your ear  Antibiotics  may be needed if a bacterial infection caused your JERE  How to stay healthy:   Wash your hands often throughout the day  Use soap and water  Rub your soapy hands together, lacing your fingers, for at least 20 seconds  Rinse with warm, running water   Dry your hands with a clean towel or paper towel  Use hand  that contains alcohol if soap and water are not available  Teach children how to wash their hands and use hand   Avoid people who are sick  Some germs are easily and quickly spread through contact  Follow up with your doctor as directed:  Write down your questions so you remember to ask them during your visits  © Copyright [employer] 2022 Information is for End User's use only and may not be sold, redistributed or otherwise used for commercial purposes  All illustrations and images included in CareNotes® are the copyrighted property of [employer]  or [facility] Tatiana Andrade   The above information is an  only  It is not intended as medical advice for individual conditions or treatments  Talk to your doctor, nurse or pharmacist before following any medical regimen to see if it is safe and effective for you

## 2022-10-11 LAB
SARS-COV-2 AG UPPER RESP QL IA: NEGATIVE
VALID CONTROL: NORMAL

## 2023-01-16 ENCOUNTER — RA CDI HCC (OUTPATIENT)
Dept: OTHER | Facility: HOSPITAL | Age: 73
End: 2023-01-16

## 2023-01-16 NOTE — PROGRESS NOTES
[facility]  coding opportunities          Chart Reviewed number of suggestions sent to Provider: 2  [id number] [phone]  [phone]     Patients Insurance     Medicare Insurance: [facility] Advantage

## 2023-01-23 ENCOUNTER — OFFICE VISIT (OUTPATIENT)
Dept: FAMILY MEDICINE CLINIC | Facility: CLINIC | Age: 73
End: 2023-01-23

## 2023-01-23 VITALS
OXYGEN SATURATION: 98 % | HEIGHT: 71 IN | DIASTOLIC BLOOD PRESSURE: 82 MMHG | BODY MASS INDEX: 36.12 KG/M2 | SYSTOLIC BLOOD PRESSURE: 138 MMHG | HEART RATE: 73 BPM | WEIGHT: 258 LBS | RESPIRATION RATE: 18 BRPM | TEMPERATURE: 98.2 F

## 2023-01-23 DIAGNOSIS — E11.69 TYPE 2 DIABETES MELLITUS WITH OTHER SPECIFIED COMPLICATION, WITHOUT LONG-TERM CURRENT USE OF INSULIN (HCC): Primary | ICD-10-CM

## 2023-01-23 NOTE — PROGRESS NOTES
Assessment/Plan   Problem List Items Addressed This Visit        Endocrine    Type 2 diabetes mellitus, without long-term current use of insulin ([facility] ) - Primary             Chief Complaint   Patient presents with   • 6 month follow-up     No new or ongoing issues to be addressed today  Subjective   Patient ID: Gonzales Dyer is a 74 y o  male  Vitals:    01/23/23 1532   BP: 138/82   Pulse: 73   Resp: 18   Temp: 98 2 °F (36 8 °C)   SpO2: 98%     Follow 6 month appointment   Patient has visits at [place] for diabetes and see [facility] cardiology for Atrial fib - previous MI  NO medications are written through this office - [facility]   No issues to discuss today       The following portions of the patient's history were reviewed and updated as appropriate: allergies, current medications, past medical history, past social history, past surgical history and problem list     Review of Systems   Constitutional: Negative  HENT: Negative  Eyes: Negative  Respiratory: Negative  Cardiovascular: Negative  Gastrointestinal: Negative  Endocrine: Negative  Genitourinary: Negative  Musculoskeletal: Negative  Skin: Negative  Allergic/Immunologic: Negative  Neurological: Negative  Hematological: Negative  Psychiatric/Behavioral: Negative  Objective     Physical Exam  Vitals and nursing note reviewed  Constitutional:       General: He is not in acute distress  Appearance: Normal appearance  He is not ill-appearing, toxic-appearing or diaphoretic  HENT:      Head: Normocephalic and atraumatic  Nose: Nose normal  No congestion  Eyes:      General:         Right eye: No discharge  Left eye: No discharge  Extraocular Movements: Extraocular movements intact  Conjunctiva/sclera: Conjunctivae normal    Cardiovascular:      Rate and Rhythm: Normal rate and regular rhythm  Pulses: Normal pulses  Heart sounds: Normal heart sounds   No murmur heard  Pulmonary:      Effort: Pulmonary effort is normal  No respiratory distress  Breath sounds: Normal breath sounds  Abdominal:      General: Bowel sounds are normal       Palpations: Abdomen is soft  Musculoskeletal:         General: No swelling or deformity  Normal range of motion  Cervical back: Normal range of motion  Left lower leg: No edema  Skin:     General: Skin is warm and dry  Capillary Refill: Capillary refill takes less than 2 seconds  Neurological:      Mental Status: He is alert and oriented to person, place, and time  Psychiatric:         Mood and Affect: Mood normal          Behavior: Behavior normal          Thought Content:  Thought content normal          Judgment: Judgment normal

## 2023-06-13 ENCOUNTER — VBI (OUTPATIENT)
Dept: ADMINISTRATIVE | Facility: OTHER | Age: 73
End: 2023-06-13

## 2023-06-15 LAB
LEFT EYE DIABETIC RETINOPATHY: NORMAL
RIGHT EYE DIABETIC RETINOPATHY: NORMAL

## 2023-08-21 ENCOUNTER — VBI (OUTPATIENT)
Dept: ADMINISTRATIVE | Facility: OTHER | Age: 73
End: 2023-08-21

## 2023-10-27 ENCOUNTER — OFFICE VISIT (OUTPATIENT)
Dept: FAMILY MEDICINE CLINIC | Facility: CLINIC | Age: 73
End: 2023-10-27
Payer: COMMERCIAL

## 2023-10-27 VITALS
RESPIRATION RATE: 18 BRPM | WEIGHT: 261.8 LBS | DIASTOLIC BLOOD PRESSURE: 72 MMHG | HEART RATE: 64 BPM | TEMPERATURE: 97.5 F | BODY MASS INDEX: 36.65 KG/M2 | SYSTOLIC BLOOD PRESSURE: 116 MMHG | HEIGHT: 71 IN | OXYGEN SATURATION: 97 %

## 2023-10-27 DIAGNOSIS — Z00.00 MEDICARE ANNUAL WELLNESS VISIT, SUBSEQUENT: Primary | ICD-10-CM

## 2023-10-27 DIAGNOSIS — E11.69 TYPE 2 DIABETES MELLITUS WITH OTHER SPECIFIED COMPLICATION, WITHOUT LONG-TERM CURRENT USE OF INSULIN (HCC): ICD-10-CM

## 2023-10-27 PROCEDURE — G0439 PPPS, SUBSEQ VISIT: HCPCS | Performed by: NURSE PRACTITIONER

## 2023-10-27 NOTE — PROGRESS NOTES
Assessment and Plan:     Problem List Items Addressed This Visit          Endocrine    Type 2 diabetes mellitus, without long-term current use of insulin ([address])     Other Visit Diagnoses       Medicare annual wellness visit, subsequent    -  Primary            BMI Counseling: Body mass index is 36.51 kg/m². The BMI is above normal. Nutrition recommendations include decreasing portion sizes, decreasing fast food intake, limiting drinks that contain sugar, increasing intake of lean protein and reducing intake of cholesterol. Exercise recommendations include moderate physical activity 150 minutes/week and strength training exercises. Rationale for BMI follow-up plan is due to patient being overweight or obese. Depression Screening and Follow-up Plan: Patient was screened for depression during today's encounter. They screened negative with a PHQ-2 score of 0. Lung Cancer Screening Shared Decision Making: I discussed with him that he is a candidate for lung cancer CT screening. The following Shared Decision-Making points were covered:  Benefits of screening were discussed, including the rates of reduction in death from lung cancer and other causes. Harms of screening were reviewed, including false positive tests, radiation exposure levels, risks of invasive procedures, risks of complications of screening, and risk of overdiagnosis. I counseled on the importance of adherence to annual lung cancer LDCT screening, impact of co-morbidities, and ability or willingness to undergo diagnosis and treatment.   I counseled on the importance of maintaining abstinence as a former smoker or was counseled on the importance of smoking cessation if a current smoker    Review of Eligibility Criteria: He meets all of the criteria for Lung Cancer Screening.   - He is 74 y.o.   - He has 20 pack year tobacco history and is a current smoker or has quit within the past 15 years  - He presents no signs or symptoms of lung cancer    After discussion, the patient decided to elect lung cancer screening. Preventive health issues were discussed with patient, and age appropriate screening tests were ordered as noted in patient's After Visit Summary. Personalized health advice and appropriate referrals for health education or preventive services given if needed, as noted in patient's After Visit Summary. History of Present Illness:     Patient presents for a Medicare Wellness Visit    HPI   Patient Care Team:  Azucena De Guzman as PCP - General (Family Medicine)     Review of Systems:     Review of Systems   Constitutional:  Positive for activity change. HENT: Negative. Eyes: Negative. Respiratory: Negative. Cardiovascular: Negative. Gastrointestinal: Negative. Endocrine: Negative. Genitourinary: Negative. Musculoskeletal:  Positive for arthralgias, gait problem and joint swelling (right knee- sees [address]). Skin: Negative. Allergic/Immunologic: Negative. Neurological:  Negative for dizziness and seizures. Hematological: Negative. Psychiatric/Behavioral: Negative.           Problem List:     Patient Active Problem List   Diagnosis    Chronic obstructive pulmonary disease (COPD) ([address])    Right sided weakness    Hypertensive emergency    Paroxysmal atrial fibrillation (HCC)    Type 2 diabetes mellitus, without long-term current use of insulin (HCC)    NSTEMI (non-ST elevated myocardial infarction) ([address])    Essential hypertension    Acute on chronic congestive heart failure (HCC)    Stage 3b chronic kidney disease ([address])    Obstructive sleep apnea    Chest pain    Hypoxia    Pacemaker    BMI 35.0-35.9,adult    Polyneuropathy associated with underlying disease ([address])    Stage 4 chronic kidney disease due to type 2 diabetes mellitus (HCC)    Ascending aorta dilatation (HCC)    Obesity, morbid ([address])    Secondary hyperparathyroidism ([address])      Past Medical and Surgical History:     Past Medical History: Diagnosis Date    A-fib [facility])     Arthritis     CHF (congestive heart failure) (HCC)     Chronic kidney disease don't remember    COPD (chronic obstructive pulmonary disease) (HCC)     Diabetes mellitus ([address])     Enlarged prostate     Gout     Heart murmur birth    Hyperlipidemia     Hypertension      Past Surgical History:   Procedure Laterality Date    A-V CARDIAC PACEMAKER INSERTION  08/2022    EAR SURGERY      EYE SURGERY  2021    cat. repaired    FRACTURE SURGERY        Family History:     Family History   Problem Relation Age of Onset    Dementia Mother     Diabetes Mother     Cancer Father       Social History:     Social History     Socioeconomic History    Marital status: /Civil Union     Spouse name: None    Number of children: None    Years of education: None    Highest education level: None   Occupational History    None   Tobacco Use    Smoking status: Former     Types: Pipe    Smokeless tobacco: Never   Vaping Use    Vaping Use: Never used   Substance and Sexual Activity    Alcohol use: Yes     Alcohol/week: 2.0 standard drinks of alcohol     Types: 1 Cans of beer, 1 Standard drinks or equivalent per week     Comment: occassional    Drug use: No    Sexual activity: Not Currently     Partners: Female   Other Topics Concern    None   Social History Narrative    None     Social Determinants of Health     Financial Resource Strain: Low Risk  (10/21/2023)    Overall Financial Resource Strain (CARDIA)     Difficulty of Paying Living Expenses: Not hard at all   Food Insecurity: Not on file   Transportation Needs: No Transportation Needs (10/21/2023)    PRAPARE - Transportation     Lack of Transportation (Medical): No     Lack of Transportation (Non-Medical):  No   Physical Activity: Inactive (11/8/2021)    Exercise Vital Sign     Days of Exercise per Week: 0 days     Minutes of Exercise per Session: 0 min   Stress: No Stress Concern Present (11/8/2021)    [employer]ational [employer] - Occupational Stress Questionnaire     Feeling of Stress : Not at all   Social Connections: Not on file   Intimate Partner Violence: Not At Risk (10/27/2023)    Humiliation, Afraid, Rape, and Kick questionnaire     Fear of Current or Ex-Partner: No     Emotionally Abused: No     Physically Abused: No     Sexually Abused: No   Housing Stability: Not on file      Medications and Allergies:     Current Outpatient Medications   Medication Sig Dispense Refill    albuterol (PROVENTIL HFA,VENTOLIN HFA) 90 mcg/act inhaler INHALE 2 PUFFS BY MOUTH FOUR TIMES A DAY AS NEEDED FOR BREATHING      allopurinol (ZYLOPRIM) 100 mg tablet Take 100 mg by mouth daily        amLODIPine (NORVASC) 10 mg tablet Take 5 mg by mouth daily Patient cuts 10mg pill in half. 5mg      apixaban (ELIQUIS) 5 mg Take 5 mg by mouth 2 (two) times a day      aspirin 81 mg chewable tablet Chew 81 mg daily      cholecalciferol (VITAMIN D3) 400 units tablet Take 400 Units by mouth daily      Empagliflozin 25 MG TABS Take 12.5 mg by mouth in the morning      ferrous sulfate 325 (65 Fe) mg tablet Take 325 mg by mouth daily with breakfast      fluticasone-salmeterol (Advair) 250-50 mcg/dose inhaler Inhale 1 puff 2 (two) times a day Rinse mouth after use.       folic acid (FOLVITE) 1 mg tablet Take 1 mg by mouth daily       hydrALAZINE (APRESOLINE) 50 mg tablet Take 1 tablet (50 mg total) by mouth 2 (two) times a day 60 tablet 0    losartan (COZAAR) 50 mg tablet Take 100 mg by mouth daily       LUTEIN PO Take by mouth      metoprolol succinate (TOPROL-XL) 50 mg 24 hr tablet Take 50 mg by mouth daily      multivitamin (THERAGRAN) TABS Take 1 tablet by mouth daily      Nutritional Supplements (VITAMIN D BOOSTER PO) Take 2,000 Units by mouth       pantoprazole (PROTONIX) 40 mg tablet Take 40 mg by mouth daily      potassium chloride (K-DUR,KLOR-CON) 20 mEq tablet Take 20 mEq by mouth daily      simvastatin (ZOCOR) 10 mg tablet Take 10 mg by mouth daily at bedtime sodium chloride (OCEAN) 0.65 % nasal spray 1 spray into each nostril 2 (two) times a day as needed      tamsulosin (FLOMAX) 0.4 mg Take 0.8 mg by mouth daily with dinner      torsemide (DEMADEX) 20 mg tablet Take 3 tablets (60 mg total) by mouth daily 90 tablet 0     No current facility-administered medications for this visit. Allergies   Allergen Reactions    Bee Venom Anaphylaxis    Enalapril Other (See Comments)     unknown    Morphine GI Intolerance      Immunizations:     Immunization History   Administered Date(s) Administered    COVID-19 MODERNA VACC 0.5 ML IM 02/25/2021, 03/25/2021, 12/13/2021, 08/17/2022    COVID-19 Moderna mRNA Vaccine 12 Yr+ 50 mcg/0.5 mL (Spikevax) 10/04/2023    INFLUENZA 11/14/2010, 11/01/2011, 11/08/2012, 11/08/2013, 10/30/2014, 06/25/2018, 10/01/2019    Influenza Quadrivalent Preservative Free 3 years and older IM 10/22/2018, 09/20/2019, 10/01/2020    Influenza, Seasonal Vaccine, Quadrivalent, Adjuvanted, . 5e 10/05/2021, 11/02/2022    Influenza, high dose seasonal 0.7 mL 10/04/2023    Influenza, seasonal, injectable, preservative free 10/20/2015, 11/18/2016, 09/18/2017    Pneumococcal 12/22/2010    Pneumococcal Conjugate 13-Valent 05/17/2016    Pneumococcal Polysaccharide PPV23 07/25/2016    Tdap 03/31/2014    Zoster 02/19/2015    Zoster Vaccine Recombinant 10/19/2020, 01/19/2021      Health Maintenance:         Topic Date Due    Hepatitis C Screening  07/06/2024 (Originally 1950)    Colorectal Cancer Screening  12/02/2024     There are no preventive care reminders to display for this patient. Medicare Screening Tests and Risk Assessments:     Sandhya Marino is here for his Subsequent Wellness visit. Last Medicare Wellness visit information reviewed, patient interviewed and updates made to the record today. Health Risk Assessment:   Patient rates overall health as very good. Patient feels that their physical health rating is same. Patient is very satisfied with their life. Eyesight was rated as same. Hearing was rated as same. Patient feels that their emotional and mental health rating is same. Patients states they are never, rarely angry. Patient states they are never, rarely unusually tired/fatigued. Pain experienced in the last 7 days has been some. Patient's pain rating has been 2/10. Patient states that he has experienced no weight loss or gain in last 6 months. Depression Screening:   PHQ-2 Score: 0      Fall Risk Screening: In the past year, patient has experienced: no history of falling in past year      Home Safety:  Patient does not have trouble with stairs inside or outside of their home. Patient has working smoke alarms and has no working carbon monoxide detector. Home safety hazards include: none. Nutrition:   Current diet is Regular. Medications:   Patient is not currently taking any over-the-counter supplements. Patient is able to manage medications. Activities of Daily Living (ADLs)/Instrumental Activities of Daily Living (IADLs):   Walk and transfer into and out of bed and chair?: Yes  Dress and groom yourself?: Yes    Bathe or shower yourself?: Yes    Feed yourself?  Yes  Do your laundry/housekeeping?: Yes  Manage your money, pay your bills and track your expenses?: Yes  Make your own meals?: Yes    Do your own shopping?: Yes    Previous Hospitalizations:   Any hospitalizations or ED visits within the last 12 months?: No      Advance Care Planning:   Living will: Yes    Durable POA for healthcare: No    Advanced directive: Yes      PREVENTIVE SCREENINGS      Cardiovascular Screening:    General: Risks and Benefits Discussed and Screening Current      Diabetes Screening:     General: Screening Not Indicated and History Diabetes      Colorectal Cancer Screening:     General: Screening Current      Prostate Cancer Screening:    General: Risks and Benefits Discussed and Screening Current      Abdominal Aortic Aneurysm (AAA) Screening:    Risk factors include: age between 70-77 yo and tobacco use        Lung Cancer Screening:     General: Screening Not Indicated and Risks and Benefits Discussed      Hepatitis C Screening:    General: Screening Current    Screening, Brief Intervention, and Referral to Treatment (SBIRT)    Screening  Typical number of drinks in a day: 0  Typical number of drinks in a week: 2  Interpretation: Low risk drinking behavior. AUDIT-C Screenin) How often did you have a drink containing alcohol in the past year? 2 to 4 times a month  2) How many drinks did you have on a typical day when you were drinking in the past year? 1 to 2  3) How often did you have 6 or more drinks on one occasion in the past year? never    AUDIT-C Score: 2  Interpretation: Score 0-3 (male): Negative screen for alcohol misuse    Single Item Drug Screening:  How often have you used an illegal drug (including marijuana) or a prescription medication for non-medical reasons in the past year? never    Single Item Drug Screen Score: 0  Interpretation: Negative screen for possible drug use disorder    Other Counseling Topics:   Car/seat belt/driving safety and skin self-exam.     Vision Screening    Right eye Left eye Both eyes   Without correction      With correction   20/20        Physical Exam:     /72 (BP Location: Left arm, Patient Position: Sitting, Cuff Size: Large)   Pulse 64   Temp 97.5 °F (36.4 °C)   Resp 18   Ht 5' 11" (1.803 m)   Wt 119 kg (261 lb 12.8 oz)   SpO2 97%   BMI 36.51 kg/m²     Physical Exam  Vitals and nursing note reviewed. Constitutional:       Appearance: He is obese. He is not ill-appearing. HENT:      Head: Normocephalic and atraumatic. Right Ear: Tympanic membrane, ear canal and external ear normal. There is no impacted cerumen. Left Ear: Tympanic membrane, ear canal and external ear normal. There is no impacted cerumen. Nose: Nose normal. No congestion.       Mouth/Throat:      Mouth: Mucous membranes are moist.      Pharynx: Oropharynx is clear. No oropharyngeal exudate. Eyes:      General:         Right eye: No discharge. Left eye: No discharge. Extraocular Movements: Extraocular movements intact. Conjunctiva/sclera: Conjunctivae normal.   Cardiovascular:      Rate and Rhythm: Normal rate and regular rhythm. Pulses: Normal pulses. Heart sounds: Murmur heard. Pulmonary:      Effort: Pulmonary effort is normal. No respiratory distress. Breath sounds: Normal breath sounds. Abdominal:      General: Bowel sounds are normal.      Palpations: Abdomen is soft. Tenderness: There is no right CVA tenderness or left CVA tenderness. Musculoskeletal:         General: Tenderness (right knee-wearing a brace) present. Cervical back: Normal range of motion and neck supple. Right lower leg: Edema (trace swelling) present. Left lower leg: Edema (trace) present. Lymphadenopathy:      Cervical: No cervical adenopathy. Skin:     General: Skin is warm and dry. Capillary Refill: Capillary refill takes less than 2 seconds. Neurological:      Mental Status: He is alert and oriented to person, place, and time. Psychiatric:         Mood and Affect: Mood normal.         Behavior: Behavior normal.         Thought Content:  Thought content normal.         Judgment: Judgment normal.          ROBER Bettencourt

## 2023-10-27 NOTE — PATIENT INSTRUCTIONS
Medicare Preventive Visit Patient Instructions  Thank you for completing your Welcome to Medicare Visit or Medicare Annual Wellness Visit today. Your next wellness visit will be due in one year (10/27/2024). The screening/preventive services that you may require over the next 5-10 years are detailed below. Some tests may not apply to you based off risk factors and/or age. Screening tests ordered at today's visit but not completed yet may show as past due. Also, please note that scanned in results may not display below. Preventive Screenings:  Service Recommendations Previous Testing/Comments   Colorectal Cancer Screening  Colonoscopy    Fecal Occult Blood Test (FOBT)/Fecal Immunochemical Test (FIT)  Fecal DNA/Cologuard Test  Flexible Sigmoidoscopy Age: 43-73 years old   Colonoscopy: every 10 years (May be performed more frequently if at higher risk)  OR  FOBT/FIT: every 1 year  OR  Cologuard: every 3 years  OR  Sigmoidoscopy: every 5 years  Screening may be recommended earlier than age 39 if at higher risk for colorectal cancer. Also, an individualized decision between you and your healthcare provider will decide whether screening between the ages of 77-80 would be appropriate. Colonoscopy: 12/02/2019  FOBT/FIT: Not on file  Cologuard: Not on file  Sigmoidoscopy: Not on file          Prostate Cancer Screening Individualized decision between patient and health care provider in men between ages of 53-66   Medicare will cover every 12 months beginning on the day after your 50th birthday PSA: No results in last 5 years           Hepatitis C Screening Once for adults born between 80 and 1965  More frequently in patients at high risk for Hepatitis C Hep C Antibody: Not on file        Diabetes Screening 1-2 times per year if you're at risk for diabetes or have pre-diabetes Fasting glucose: 108 mg/dL (3/16/2021)  A1C: 5.2 % (3/15/2021)      Cholesterol Screening Once every 5 years if you don't have a lipid disorder.  May order more often based on risk factors. Lipid panel: Not on file         Other Preventive Screenings Covered by Medicare:  Abdominal Aortic Aneurysm (AAA) Screening: covered once if your at risk. You're considered to be at risk if you have a family history of AAA or a male between the age of 70-76 who smoking at least 100 cigarettes in your lifetime. Lung Cancer Screening: covers low dose CT scan once per year if you meet all of the following conditions: (1) Age 48-67; (2) No signs or symptoms of lung cancer; (3) Current smoker or have quit smoking within the last 15 years; (4) You have a tobacco smoking history of at least 20 pack years (packs per day x number of years you smoked); (5) You get a written order from a healthcare provider. Glaucoma Screening: covered annually if you're considered high risk: (1) You have diabetes OR (2) Family history of glaucoma OR (3)  aged 48 and older OR (3)  American aged 72 and older  Osteoporosis Screening: covered every 2 years if you meet one of the following conditions: (1) Have a vertebral abnormality; (2) On glucocorticoid therapy for more than 3 months; (3) Have primary hyperparathyroidism; (4) On osteoporosis medications and need to assess response to drug therapy. HIV Screening: covered annually if you're between the age of 14-79. Also covered annually if you are younger than 13 and older than 72 with risk factors for HIV infection. For pregnant patients, it is covered up to 3 times per pregnancy.     Immunizations:  Immunization Recommendations   Influenza Vaccine Annual influenza vaccination during flu season is recommended for all persons aged >= 6 months who do not have contraindications   Pneumococcal Vaccine   * Pneumococcal conjugate vaccine = PCV13 (Prevnar 13), PCV15 (Vaxneuvance), PCV20 (Prevnar 20)  * Pneumococcal polysaccharide vaccine = PPSV23 (Pneumovax) Adults 66-38 yo with certain risk factors or if 69+ yo  If never received any pneumonia vaccine: recommend Prevnar 21 (PCV20)  Give PCV20 if previously received 1 dose of PCV13 or PPSV23   Hepatitis B Vaccine 3 dose series if at intermediate or high risk (ex: diabetes, end stage renal disease, liver disease)   Respiratory syncytial virus (RSV) Vaccine - COVERED BY MEDICARE PART D  * RSVPreF3 (Arexvy) CDC recommends that adults 61years of age and older may receive a single dose of RSV vaccine using shared clinical decision-making (SCDM)   Tetanus (Td) Vaccine - COST NOT COVERED BY MEDICARE PART B Following completion of primary series, a booster dose should be given every 10 years to maintain immunity against tetanus. Td may also be given as tetanus wound prophylaxis. Tdap Vaccine - COST NOT COVERED BY MEDICARE PART B Recommended at least once for all adults. For pregnant patients, recommended with each pregnancy. Shingles Vaccine (Shingrix) - COST NOT COVERED BY MEDICARE PART B  2 shot series recommended in those 19 years and older who have or will have weakened immune systems or those 50 years and older     Health Maintenance Due:      Topic Date Due   • Hepatitis C Screening  07/06/2024 (Originally 1950)   • Colorectal Cancer Screening  12/02/2024     Immunizations Due:  There are no preventive care reminders to display for this patient. Advance Directives   What are advance directives? Advance directives are legal documents that state your wishes and plans for medical care. These plans are made ahead of time in case you lose your ability to make decisions for yourself. Advance directives can apply to any medical decision, such as the treatments you want, and if you want to donate organs. What are the types of advance directives? There are many types of advance directives, and each state has rules about how to use them. You may choose a combination of any of the following:  Living will: This is a written record of the treatment you want.  You can also choose which treatments you do not want, which to limit, and which to stop at a certain time. This includes surgery, medicine, IV fluid, and tube feedings. Durable power of  for [facility]): This is a written record that states who you want to make healthcare choices for you when you are unable to make them for yourself. This person, called a proxy, is usually a family member or a friend. You may choose more than 1 proxy. Do not resuscitate (DNR) order:  A DNR order is used in case your heart stops beating or you stop breathing. It is a request not to have certain forms of treatment, such as CPR. A DNR order may be included in other types of advance directives. Medical directive: This covers the care that you want if you are in a coma, near death, or unable to make decisions for yourself. You can list the treatments you want for each condition. Treatment may include pain medicine, surgery, blood transfusions, dialysis, IV or tube feedings, and a ventilator (breathing machine). Values history: This document has questions about your views, beliefs, and how you feel and think about life. This information can help others choose the care that you would choose. Why are advance directives important? An advance directive helps you control your care. Although spoken wishes may be used, it is better to have your wishes written down. Spoken wishes can be misunderstood, or not followed. Treatments may be given even if you do not want them. An advance directive may make it easier for your family to make difficult choices about your care. Weight Management   Why it is important to manage your weight:  Being overweight increases your risk of health conditions such as heart disease, high blood pressure, type 2 diabetes, and certain types of cancer. It can also increase your risk for osteoarthritis, sleep apnea, and other respiratory problems. Aim for a slow, steady weight loss.  Even a small amount of weight loss can lower your risk of health problems. How to lose weight safely:  A safe and healthy way to lose weight is to eat fewer calories and get regular exercise. You can lose up about 1 pound a week by decreasing the number of calories you eat by 500 calories each day. Healthy meal plan for weight management:  A healthy meal plan includes a variety of foods, contains fewer calories, and helps you stay healthy. A healthy meal plan includes the following:  Eat whole-grain foods more often. A healthy meal plan should contain fiber. Fiber is the part of grains, fruits, and vegetables that is not broken down by your body. Whole-grain foods are healthy and provide extra fiber in your diet. Some examples of whole-grain foods are whole-wheat breads and pastas, oatmeal, brown rice, and bulgur. Eat a variety of vegetables every day. Include dark, leafy greens such as spinach, kale, mauri greens, and mustard greens. Eat yellow and orange vegetables such as carrots, sweet potatoes, and winter squash. Eat a variety of fruits every day. Choose fresh or canned fruit (canned in its own juice or light syrup) instead of juice. Fruit juice has very little or no fiber. Eat low-fat dairy foods. Drink fat-free (skim) milk or 1% milk. Eat fat-free yogurt and low-fat cottage cheese. Try low-fat cheeses such as mozzarella and other reduced-fat cheeses. Choose meat and other protein foods that are low in fat. Choose beans or other legumes such as split peas or lentils. Choose fish, skinless poultry (chicken or turkey), or lean cuts of red meat (beef or pork). Before you cook meat or poultry, cut off any visible fat. Use less fat and oil. Try baking foods instead of frying them. Add less fat, such as margarine, sour cream, regular salad dressing and mayonnaise to foods. Eat fewer high-fat foods. Some examples of high-fat foods include french fries, doughnuts, ice cream, and cakes. Eat fewer sweets.   Limit foods and drinks that are high in sugar. This includes candy, cookies, regular soda, and sweetened drinks. Exercise:  Exercise at least 30 minutes per day on most days of the week. Some examples of exercise include walking, biking, dancing, and swimming. You can also fit in more physical activity by taking the stairs instead of the elevator or parking farther away from stores. Ask your healthcare provider about the best exercise plan for you. © Copyright [employer] 2018 Information is for End User's use only and may not be sold, redistributed or otherwise used for commercial purposes.  All illustrations and images included in CareNotes® are the copyrighted property of A.D.A.M., Inc. or  [address]

## 2024-01-16 ENCOUNTER — VBI (OUTPATIENT)
Dept: ADMINISTRATIVE | Facility: OTHER | Age: 74
End: 2024-01-16

## 2024-01-16 NOTE — TELEPHONE ENCOUNTER
Upon review of the In Basket request we were able to locate, review, and update the patient chart as requested for Diabetic Eye Exam.    Any additional questions or concerns should be emailed to the Practice Liaisons via the appropriate education email address, please do not reply via In Basket.    Thank you  Tessa Null

## 2024-01-19 NOTE — DISCHARGE INSTRUCTIONS
Follow-up with PCP in 1 week  Follow-up with Cardiology as outpatient in 1-2 weeks  Return to ER with any worsening shortness of breath, chest pain, palpitation or any alarming symptoms  Patient : Tani Arcos Age: 2 month old Sex: male   MRN: [id number] Encounter Date: 1/18/2024      History     Chief Complaint   Patient presents with    Fever    Cough     HPI      Teaching-Supervisory Addendum-Brief     This is an addendum to the resident's notes, Dr. Beverly  please review his/her notes for the full H&P, medical decision making, re-evaluation, consultation, and final disposition.  I have discussed      I participated in the following activities of this patients care: the medical history, the physical exam, medical decision making, the procedure.     I personally performed: supervision of the patient's care, the medical history, the physical exam, the medical decision making.     The case was discussed with: the resident    Procedures: I directly supervised any procedure(s) noted by resident    Evaluation and management service: I agree with the evaluation and management decisions made in this patient's care.     2 mo bb with no pmh, no allergies, born full term, vaccination utd, comes to the ed due fever of 99F noted today assoc with cough, running nose and congestion. No v/d, no rash, no resp diff, no lethargy, no cyanosis, no seizures, no tremors.  ++ sick contact with mom having similar symptoms last week.  Pt is formula fed, feeding normally and making wet diapers.           No Known Allergies    Current Discharge Medication List        Prior to Admission Medications    Details   cholecalciferol (VITAMIN D) 10 mcg (400 units)/mL oral liquid Take 1 mL by mouth daily.  Qty: 60 mL, Refills: 12             Past Medical History:   Diagnosis Date    No known problems        Past Surgical History:   Procedure Laterality Date    CIRCUMCISION, PRIMARY         No family history on file.    Social History     Tobacco Use    Smoking status: Never     Passive exposure: Never    Smokeless tobacco: Never       Review of Systems    Physical Exam     ED Triage Vitals [01/18/24 1936]   ED Triage Vitals  Group      Temp 100.2 °F (37.9 °C)      Heart Rate 143      Resp 46      BP       SpO2 96 %      EtCO2 mmHg       Height       Weight 11 lb 8.8 oz (5.24 kg)      Weight Scale Used Infant scale      BMI (Calculated)       IBW/kg (Calculated)        Physical Exam    ED Course     Procedures    Lab Results     Results for orders placed or performed during the hospital encounter of 01/18/24   COVID/Flu/RSV panel   Result Value Ref Range    Rapid SARS-COV-2 by PCR Not Detected Not Detected / Detected / Presumptive Positive / Inhibitors present    Influenza A by PCR Detected (A) Not Detected    Influenza B by PCR Not Detected Not Detected    RSV BY PCR Not Detected Not Detected    Isolation Guidelines      Procedural Comment         EKG Results         Radiology Results     Imaging Results    None         ED Medication Orders (From admission, onward)      Ordered Start     Status Ordering Provider    01/18/24 2158 01/18/24 2158  acetaminophen (TYLENOL) 160 MG/5ML suspension 80 mg  ONCE         Last MAR action: Given CARTER MADRIGAL            ED Course as of 01/18/24 2329   Thu Jan 18, 2024 2326 Family offered tamiflu, discussed risks vs benefits, parents would like to avoid at this time.  [ES]      ED Course User Index  [ES] Ludy Beverly, DO       Medical Decision Making    Pt of above age, presents to the ED with fever as described above. controlled after meds in triage/home, no other symptoms reported, pt looks well, not toxic, not ill apearing, tolerating well, will observe for a period of time, will do viral swab,  po challenge and revital.  If pt continues to do well, no resp or clinical distress, then consider  dc home with proper education and instructions to pt/family/parents about fever control, rest, hydration and close follow up with pmd.  If pt becomes symptomatic or develops any distress, then will do further workup and follow pt closely      Swab studies show ++ FLU, this explains pt symptoms, pt is  tolerating po, vitals improved, no distress, doing well, will dc home with education about flu, education about fever, education about importance of suctioning if pt hs congestion/running nose, and importance of follow up with pmd    11:29 PM Recheck on patient. Discussed with patient ED findings and plan for discharge. Patient was given ED warnings, discharge instructions, and follow up information to go home with. Patient understands and agrees with plan for discharge. Any questions have been answered.      Clinical Impression     ED Diagnosis   1. Influenza        2. Fever, unspecified fever cause        3. Upper respiratory tract infection, unspecified type            Disposition        Discharge 1/18/2024 11:27 PM  Tani Arcos discharge to home/self care.           Braulio Castro MD  01/18/24 5065

## 2024-06-14 ENCOUNTER — TELEPHONE (OUTPATIENT)
Age: 74
End: 2024-06-14

## 2024-06-14 NOTE — TELEPHONE ENCOUNTER
Alexandra from [facility] called requesting labs from Pts most recent hospital stay. Alexandra stated she has the pt with her now, unsuccessful warm transfer. Please fax labs to [phone]

## 2024-09-05 ENCOUNTER — TELEMEDICINE (OUTPATIENT)
Dept: FAMILY MEDICINE CLINIC | Facility: CLINIC | Age: 74
End: 2024-09-05
Payer: COMMERCIAL

## 2024-09-05 ENCOUNTER — TELEPHONE (OUTPATIENT)
Age: 74
End: 2024-09-05

## 2024-09-05 DIAGNOSIS — U07.1 COVID-19: Primary | ICD-10-CM

## 2024-09-05 PROCEDURE — 99214 OFFICE O/P EST MOD 30 MIN: CPT | Performed by: FAMILY MEDICINE

## 2024-09-05 PROCEDURE — G2211 COMPLEX E/M VISIT ADD ON: HCPCS | Performed by: FAMILY MEDICINE

## 2024-09-05 RX ORDER — BROMPHENIRAMINE MALEATE, PSEUDOEPHEDRINE HYDROCHLORIDE, AND DEXTROMETHORPHAN HYDROBROMIDE 2; 30; 10 MG/5ML; MG/5ML; MG/5ML
5 SYRUP ORAL 4 TIMES DAILY PRN
Qty: 120 ML | Refills: 0 | Status: SHIPPED | OUTPATIENT
Start: 2024-09-05

## 2024-09-05 NOTE — ASSESSMENT & PLAN NOTE
Symptom onset + 09/02  Date of exposure unknown  Vaccination status + and booster  COVID test + today  Having cough, congestion, mucinex helping for cough, sore throat worsening but overall better.  - brom phed QID PRN  - continue supportive care, maintain adequate hydration and nutrition   - isolation guidelines reviewed  - ED precautions reviewed   - follow up: prn

## 2024-09-05 NOTE — PROGRESS NOTES
COVID-19 Outpatient Progress Note  Name: Dangelo Cooper      : 1950      MRN: [id number]  Encounter Provider: Moe Solo DO  Encounter Date: 2024   Encounter department: [facility]    Assessment & Plan   1. COVID-19  Assessment & Plan:  Symptom onset +   Date of exposure unknown  Vaccination status + and booster  COVID test + today  Having cough, congestion, mucinex helping for cough, sore throat worsening but overall better.  - brom phed QID PRN  - continue supportive care, maintain adequate hydration and nutrition   - isolation guidelines reviewed  - ED precautions reviewed   - follow up: prn    Orders:  -     brompheniramine-pseudoephedrine-DM 30-2-10 MG/5ML syrup; Take 5 mL by mouth 4 (four) times a day as needed for congestion, cough or allergies    Disposition:     I have spent a total time of 33 minutes on the day of the encounter for this patient including discussing diagnostic results, discussing prognosis, risks and benefits of treatment options, instructions for management, importance of treatment compliance, documenting in the medical record and obtaining or reviewing history.          Encounter provider: Moe Solo DO     Provider located at: [address] [address]     Recent Visits  No visits were found meeting these conditions.  Showing recent visits within past 7 days and meeting all other requirements  Today's Visits  Date Type Provider Dept   24 Telemedicine Moe Solo DO [facility]   Showing today's visits and meeting all other requirements  Future Appointments  No visits were found meeting these conditions.  Showing future appointments within next 150 days and meeting all other requirements    History of Present Illness      This virtual check-in was done via [employer] and patient was informed that this is a secure, HIPAA-compliant platform. He agrees to proceed.    Patient agrees to  participate in a virtual check in via telephone or video visit instead of presenting to the office to address urgent/immediate medical needs. Patient is aware this is a billable service. He acknowledged consent and understanding of privacy and security of the video platform. The patient has agreed to participate and understands they can discontinue the visit at any time.    After connecting through [employer], the patient was identified by name and date of birth. Dangelo Cooper was informed that this was a telemedicine visit and that the exam was being conducted confidentially over secure lines. My office door was closed. No one else was in the room. Dangelo Cooper acknowledged consent and understanding of privacy and security of the telemedicine visit. I informed the patient that I have reviewed his record in Epic and presented the opportunity for him to ask any questions regarding the visit today. The patient agreed to participate.     Verification of patient location:  Patient is located in the following state in which I hold an active license: [place]    Subjective:   Dangelo Cooper is a 73 y.o. male who is concerned about COVID-19. Patient's symptoms include fatigue, nasal congestion, sore throat and cough. Patient denies fever, chills, malaise, rhinorrhea, anosmia, loss of taste, shortness of breath, chest tightness, abdominal pain, nausea, vomiting, diarrhea, myalgias and headaches.     - Date of symptom onset: 9/2/2024      COVID-19 vaccination status: Fully vaccinated with booster    Lab Results   Component Value Date    SARSCOV2 Negative 10/31/2021    SARSCOV2 Negative 05/05/2021    SARSCOVAG Negative 10/11/2022       Review of Systems   Constitutional:  Positive for fatigue. Negative for chills and fever.   HENT:  Positive for congestion and sore throat. Negative for ear pain and rhinorrhea.    Eyes:  Negative for pain and visual disturbance.   Respiratory:  Positive for cough. Negative for chest  tightness and shortness of breath.    Cardiovascular:  Negative for chest pain and palpitations.   Gastrointestinal:  Negative for abdominal pain, diarrhea, nausea and vomiting.   Genitourinary:  Negative for dysuria and hematuria.   Musculoskeletal:  Negative for arthralgias, back pain and myalgias.   Skin:  Negative for color change and rash.   Neurological:  Negative for seizures, syncope and headaches.   All other systems reviewed and are negative.    Objective     There were no vitals taken for this visit.    Physical Exam  Pulmonary:      Effort: Pulmonary effort is normal. No respiratory distress.   Neurological:      Mental Status: He is alert.

## 2024-09-10 ENCOUNTER — OFFICE VISIT (OUTPATIENT)
Dept: FAMILY MEDICINE CLINIC | Facility: CLINIC | Age: 74
End: 2024-09-10
Payer: COMMERCIAL

## 2024-09-10 VITALS
SYSTOLIC BLOOD PRESSURE: 122 MMHG | HEART RATE: 69 BPM | BODY MASS INDEX: 33.23 KG/M2 | OXYGEN SATURATION: 96 % | RESPIRATION RATE: 18 BRPM | TEMPERATURE: 98.1 F | HEIGHT: 71 IN | WEIGHT: 237.4 LBS | DIASTOLIC BLOOD PRESSURE: 74 MMHG

## 2024-09-10 DIAGNOSIS — H66.90 ACUTE OTITIS MEDIA, UNSPECIFIED OTITIS MEDIA TYPE: Primary | ICD-10-CM

## 2024-09-10 DIAGNOSIS — U07.1 COVID-19: ICD-10-CM

## 2024-09-10 PROBLEM — J02.9 SORE THROAT: Status: ACTIVE | Noted: 2024-09-10

## 2024-09-10 PROCEDURE — 99214 OFFICE O/P EST MOD 30 MIN: CPT

## 2024-09-10 PROCEDURE — G2211 COMPLEX E/M VISIT ADD ON: HCPCS

## 2024-09-10 RX ORDER — AMOXICILLIN 500 MG/1
500 CAPSULE ORAL EVERY 8 HOURS SCHEDULED
Qty: 21 CAPSULE | Refills: 0 | Status: ON HOLD | OUTPATIENT
Start: 2024-09-10 | End: 2024-09-18 | Stop reason: ALTCHOICE

## 2024-09-10 NOTE — PROGRESS NOTES
Ambulatory Visit  Name: Dangelo Cooper      : 1950      MRN: [id number]  Encounter Provider: ROBER Blanc  Encounter Date: 9/10/2024   Encounter department: [facility]    Assessment & Plan  Acute otitis media, unspecified otitis media type  This problem appears to be a secondary infection after Covid 19 infection. The patient will begin treatment with amoxicillin as ordered today. The patient will follow up if symptoms worsen or fail to improve. Follow up as needed or at next regularly scheduled appointment.    Orders:    amoxicillin (AMOXIL) 500 mg capsule; Take 1 capsule (500 mg total) by mouth every 8 (eight) hours for 7 days    COVID-19  The patient had a positive Covid test on . The patient reports his cough has improved. Advised the patient regarding Vitamin supplementation and wearing a mask through day 10 of infection. Continue previously prescribed cough medicine and OTC pain medications as needed. Follow up as needed or at next regularly scheduled appointment.           Depression Screening and Follow-up Plan: Patient was screened for depression during today's encounter. They screened negative with a PHQ-2 score of 0.      History of Present Illness     Dangelo Cooper is a 73 y.o. year old male who presents today for follow up and concerns include sore throat and ear pain. He reports he had a positive Covid test on  and had a virtual visit. He has been taking the prescribed cough medicine and Tylenol.             Review of Systems   Constitutional:  Positive for fatigue. Negative for chills and fever.   HENT:  Positive for ear pain (right side), postnasal drip, rhinorrhea, sinus pressure, sinus pain and sore throat. Negative for congestion and trouble swallowing.    Eyes: Negative.  Negative for pain and visual disturbance.   Respiratory:  Positive for cough (occasional). Negative for shortness of breath.    Cardiovascular: Negative.  Negative  "for chest pain, palpitations and leg swelling.   Gastrointestinal:  Negative for abdominal pain, constipation, diarrhea, nausea and vomiting.   Endocrine: Negative.    Genitourinary: Negative.  Negative for dysuria, frequency and urgency.   Musculoskeletal: Negative.  Negative for back pain and myalgias.   Skin: Negative.  Negative for rash.   Allergic/Immunologic: Negative.    Neurological: Negative.  Negative for dizziness, weakness, light-headedness and headaches.   Hematological: Negative.    Psychiatric/Behavioral: Negative.             Objective     /74 (BP Location: Left arm, Patient Position: Sitting, Cuff Size: Standard)   Pulse 69   Temp 98.1 °F (36.7 °C) (Tympanic)   Resp 18   Ht 5' 11\" (1.803 m)   Wt 108 kg (237 lb 6.4 oz)   SpO2 96%   BMI 33.11 kg/m²     Physical Exam  Vitals and nursing note reviewed.   Constitutional:       General: He is not in acute distress.     Appearance: Normal appearance. He is not ill-appearing.   HENT:      Head: Normocephalic and atraumatic.      Right Ear: Ear canal and external ear normal. A middle ear effusion is present. Tympanic membrane is erythematous and bulging. Tympanic membrane is not perforated.      Left Ear: Tympanic membrane, ear canal and external ear normal.  No middle ear effusion. Tympanic membrane is not perforated, erythematous or bulging.      Nose: Mucosal edema present.      Right Sinus: Maxillary sinus tenderness present. No frontal sinus tenderness.      Left Sinus: Maxillary sinus tenderness present. No frontal sinus tenderness.      Mouth/Throat:      Lips: Pink.      Pharynx: No pharyngeal swelling, oropharyngeal exudate or posterior oropharyngeal erythema.      Tonsils: No tonsillar exudate or tonsillar abscesses. 0 on the right. 0 on the left.   Cardiovascular:      Rate and Rhythm: Normal rate and regular rhythm.      Pulses: Normal pulses.      Heart sounds: Murmur heard.   Pulmonary:      Effort: Pulmonary effort is normal. No " respiratory distress.      Breath sounds: Normal breath sounds. No wheezing.   Musculoskeletal:         General: Normal range of motion.   Skin:     General: Skin is warm and dry.      Capillary Refill: Capillary refill takes less than 2 seconds.   Neurological:      General: No focal deficit present.      Mental Status: He is alert and oriented to person, place, and time.   Psychiatric:         Mood and Affect: Mood normal.         Behavior: Behavior normal.

## 2024-09-10 NOTE — ASSESSMENT & PLAN NOTE
The patient had a positive Covid test on 9/5. The patient reports his cough has improved. Advised the patient regarding Vitamin supplementation and wearing a mask through day 10 of infection. Continue previously prescribed cough medicine and OTC pain medications as needed. Follow up as needed or at next regularly scheduled appointment.

## 2024-09-18 ENCOUNTER — HOSPITAL ENCOUNTER (INPATIENT)
Facility: HOSPITAL | Age: 74
LOS: 4 days | Discharge: HOME HEALTH | DRG: 064 | End: 2024-09-22
Attending: EMERGENCY MEDICINE | Admitting: INTERNAL MEDICINE
Payer: COMMERCIAL

## 2024-09-18 ENCOUNTER — APPOINTMENT (EMERGENCY)
Dept: RADIOLOGY | Facility: HOSPITAL | Age: 74
DRG: 064 | End: 2024-09-18
Payer: COMMERCIAL

## 2024-09-18 ENCOUNTER — APPOINTMENT (EMERGENCY)
Dept: CT IMAGING | Facility: HOSPITAL | Age: 74
DRG: 064 | End: 2024-09-18
Payer: COMMERCIAL

## 2024-09-18 ENCOUNTER — APPOINTMENT (EMERGENCY)
Dept: CT IMAGING | Facility: HOSPITAL | Age: 74
DRG: 064 | End: 2024-09-18
Attending: EMERGENCY MEDICINE
Payer: COMMERCIAL

## 2024-09-18 ENCOUNTER — APPOINTMENT (EMERGENCY)
Dept: NON INVASIVE DIAGNOSTICS | Facility: HOSPITAL | Age: 74
DRG: 064 | End: 2024-09-18
Payer: COMMERCIAL

## 2024-09-18 DIAGNOSIS — R29.90 STROKE-LIKE SYMPTOMS: Primary | ICD-10-CM

## 2024-09-18 DIAGNOSIS — I50.9 CHF, ACUTE (HCC): ICD-10-CM

## 2024-09-18 DIAGNOSIS — R29.898 LEFT LEG WEAKNESS: ICD-10-CM

## 2024-09-18 DIAGNOSIS — I50.21 ACUTE HFREF (HEART FAILURE WITH REDUCED EJECTION FRACTION) (HCC): ICD-10-CM

## 2024-09-18 DIAGNOSIS — I63.9 ACUTE CVA (CEREBROVASCULAR ACCIDENT) (HCC): ICD-10-CM

## 2024-09-18 DIAGNOSIS — I50.9 CONGESTIVE HEART FAILURE (CHF) (HCC): ICD-10-CM

## 2024-09-18 DIAGNOSIS — M51.26 LUMBAR HERNIATED DISC: ICD-10-CM

## 2024-09-18 DIAGNOSIS — I50.20 HFREF (HEART FAILURE WITH REDUCED EJECTION FRACTION) (HCC): ICD-10-CM

## 2024-09-18 PROBLEM — N18.4 CKD (CHRONIC KIDNEY DISEASE) STAGE 4, GFR 15-29 ML/MIN (HCC): Status: ACTIVE | Noted: 2021-03-16

## 2024-09-18 PROBLEM — I5A NON-ISCHEMIC MYOCARDIAL INJURY (NON-TRAUMATIC): Status: ACTIVE | Noted: 2024-09-18

## 2024-09-18 PROBLEM — M54.16 LUMBAR RADICULOPATHY: Status: ACTIVE | Noted: 2024-09-18

## 2024-09-18 PROBLEM — I48.91 A-FIB (HCC): Status: ACTIVE | Noted: 2024-09-18

## 2024-09-18 LAB
2HR DELTA HS TROPONIN: -1 NG/L
ALBUMIN SERPL BCG-MCNC: 3.7 G/DL (ref 3.5–5)
ALP SERPL-CCNC: 72 U/L (ref 34–104)
ALT SERPL W P-5'-P-CCNC: 16 U/L (ref 7–52)
ANION GAP SERPL CALCULATED.3IONS-SCNC: 9 MMOL/L (ref 4–13)
AORTIC ROOT: 3.6 CM
AORTIC VALVE MEAN VELOCITY: 15.9 M/S
APICAL FOUR CHAMBER EJECTION FRACTION: 40 %
APTT PPP: 36 SECONDS (ref 23–34)
ASCENDING AORTA: 3.8 CM
AST SERPL W P-5'-P-CCNC: 20 U/L (ref 13–39)
AV LVOT MEAN GRADIENT: 2 MMHG
AV LVOT PEAK GRADIENT: 5 MMHG
AV MEAN GRADIENT: 12 MMHG
AV PEAK GRADIENT: 21 MMHG
AV VALVE AREA: 1.3 CM2
AV VELOCITY RATIO: 0.35
BILIRUB SERPL-MCNC: 1.21 MG/DL (ref 0.2–1)
BNP SERPL-MCNC: 1532 PG/ML (ref 0–100)
BSA FOR ECHO PROCEDURE: 2.24 M2
BUN SERPL-MCNC: 45 MG/DL (ref 5–25)
CALCIUM SERPL-MCNC: 9.2 MG/DL (ref 8.4–10.2)
CARDIAC TROPONIN I PNL SERPL HS: 66 NG/L
CARDIAC TROPONIN I PNL SERPL HS: 67 NG/L
CHLORIDE SERPL-SCNC: 104 MMOL/L (ref 96–108)
CO2 SERPL-SCNC: 30 MMOL/L (ref 21–32)
CREAT SERPL-MCNC: 2.49 MG/DL (ref 0.6–1.3)
D DIMER PPP FEU-MCNC: 1.12 UG/ML FEU
DOP CALC AO PEAK VEL: 2.3 M/S
DOP CALC AO VTI: 44 CM
DOP CALC LVOT AREA: 3.8 CM2
DOP CALC LVOT DIAMETER: 2.2 CM
DOP CALC LVOT PEAK VEL VTI: 15 CM
DOP CALC LVOT PEAK VEL: 0.8 M/S
DOP CALC LVOT STROKE INDEX: 37.9 ML/M2
DOP CALC LVOT STROKE VOLUME: 56.99 CM3
E WAVE DECELERATION TIME: 93 MS
ERYTHROCYTE [DISTWIDTH] IN BLOOD BY AUTOMATED COUNT: 17.2 % (ref 11.6–15.1)
EST. AVERAGE GLUCOSE BLD GHB EST-MCNC: 166 MG/DL
FRACTIONAL SHORTENING: 16 (ref 28–44)
GFR SERPL CREATININE-BSD FRML MDRD: 24 ML/MIN/1.73SQ M
GLUCOSE SERPL-MCNC: 102 MG/DL (ref 65–140)
GLUCOSE SERPL-MCNC: 224 MG/DL (ref 65–140)
GLUCOSE SERPL-MCNC: 237 MG/DL (ref 65–140)
HBA1C MFR BLD: 7.4 %
HCT VFR BLD AUTO: 42 % (ref 36.5–49.3)
HGB BLD-MCNC: 13.2 G/DL (ref 12–17)
INR PPP: 1.72 (ref 0.85–1.19)
INTERVENTRICULAR SEPTUM IN DIASTOLE (PARASTERNAL SHORT AXIS VIEW): 1.2 CM
INTERVENTRICULAR SEPTUM: 1.2 CM (ref 0.6–1.1)
LA/AORTA RATIO 2D: 1.69
LAAS-AP2: 48.2 CM2
LAAS-AP4: 51.4 CM2
LEFT ATRIUM SIZE: 6.1 CM
LEFT ATRIUM VOLUME (MOD BIPLANE): 254 ML
LEFT ATRIUM VOLUME INDEX (MOD BIPLANE): 113.4 ML/M2
LEFT INTERNAL DIMENSION IN SYSTOLE: 5.6 CM (ref 2.1–4)
LEFT VENTRICLE DIASTOLIC VOLUME (MOD BIPLANE): 233 ML
LEFT VENTRICLE DIASTOLIC VOLUME INDEX (MOD BIPLANE): 104 ML/M2
LEFT VENTRICLE SYSTOLIC VOLUME (MOD BIPLANE): 155 ML
LEFT VENTRICLE SYSTOLIC VOLUME INDEX (MOD BIPLANE): 69.2 ML/M2
LEFT VENTRICULAR INTERNAL DIMENSION IN DIASTOLE: 6.7 CM (ref 3.5–6)
LEFT VENTRICULAR POSTERIOR WALL IN END DIASTOLE: 1.2 CM
LEFT VENTRICULAR STROKE VOLUME: 79 ML
LV EF: 34 %
LVSV (TEICH): 79 ML
MCH RBC QN AUTO: 28.3 PG (ref 26.8–34.3)
MCHC RBC AUTO-ENTMCNC: 31.4 G/DL (ref 31.4–37.4)
MCV RBC AUTO: 90 FL (ref 82–98)
MITRAL REGURGITATION PEAK VELOCITY: 5.72 M/S
MITRAL VALVE MEAN INFLOW VELOCITY: 4.19 M/S
MITRAL VALVE REGURGITANT PEAK GRADIENT: 131 MMHG
MV E'TISSUE VEL-SEP: 3 CM/S
MV PEAK E VEL: 107 CM/S
MV STENOSIS PRESSURE HALF TIME: 27 MS
MV VALVE AREA P 1/2 METHOD: 8.15
PLATELET # BLD AUTO: 229 THOUSANDS/UL (ref 149–390)
PMV BLD AUTO: 8.9 FL (ref 8.9–12.7)
POTASSIUM SERPL-SCNC: 4 MMOL/L (ref 3.5–5.3)
PROT SERPL-MCNC: 7.2 G/DL (ref 6.4–8.4)
PROTHROMBIN TIME: 20.6 SECONDS (ref 12.3–15)
RBC # BLD AUTO: 4.67 MILLION/UL (ref 3.88–5.62)
RIGHT VENTRICLE ID DIMENSION: 5.2 CM
SL CV DOP CALC MV VTI RETROGRADE: 166 CM
SL CV LV EF: 30
SL CV MV MEAN GRADIENT RETROGRADE: 81 MMHG
SL CV PED ECHO LEFT VENTRICLE DIASTOLIC VOLUME (MOD BIPLANE) 2D: 230 ML
SL CV PED ECHO LEFT VENTRICLE SYSTOLIC VOLUME (MOD BIPLANE) 2D: 151 ML
SODIUM SERPL-SCNC: 143 MMOL/L (ref 135–147)
TR MAX PG: 52 MMHG
TR PEAK VELOCITY: 3.6 M/S
TRICUSPID ANNULAR PLANE SYSTOLIC EXCURSION: 1.6 CM
TRICUSPID VALVE PEAK REGURGITATION VELOCITY: 3.59 M/S
TSH SERPL DL<=0.05 MIU/L-ACNC: 0.74 UIU/ML (ref 0.45–4.5)
WBC # BLD AUTO: 8.25 THOUSAND/UL (ref 4.31–10.16)

## 2024-09-18 PROCEDURE — 99223 1ST HOSP IP/OBS HIGH 75: CPT | Performed by: INTERNAL MEDICINE

## 2024-09-18 PROCEDURE — 72131 CT LUMBAR SPINE W/O DYE: CPT

## 2024-09-18 PROCEDURE — 93971 EXTREMITY STUDY: CPT

## 2024-09-18 PROCEDURE — 85379 FIBRIN DEGRADATION QUANT: CPT | Performed by: EMERGENCY MEDICINE

## 2024-09-18 PROCEDURE — 82948 REAGENT STRIP/BLOOD GLUCOSE: CPT

## 2024-09-18 PROCEDURE — 84443 ASSAY THYROID STIM HORMONE: CPT | Performed by: INTERNAL MEDICINE

## 2024-09-18 PROCEDURE — 80061 LIPID PANEL: CPT | Performed by: INTERNAL MEDICINE

## 2024-09-18 PROCEDURE — 93306 TTE W/DOPPLER COMPLETE: CPT

## 2024-09-18 PROCEDURE — 36415 COLL VENOUS BLD VENIPUNCTURE: CPT | Performed by: EMERGENCY MEDICINE

## 2024-09-18 PROCEDURE — 93306 TTE W/DOPPLER COMPLETE: CPT | Performed by: INTERNAL MEDICINE

## 2024-09-18 PROCEDURE — 83036 HEMOGLOBIN GLYCOSYLATED A1C: CPT | Performed by: INTERNAL MEDICINE

## 2024-09-18 PROCEDURE — 93971 EXTREMITY STUDY: CPT | Performed by: SURGERY

## 2024-09-18 PROCEDURE — 94664 DEMO&/EVAL PT USE INHALER: CPT

## 2024-09-18 PROCEDURE — 94660 CPAP INITIATION&MGMT: CPT

## 2024-09-18 PROCEDURE — 70498 CT ANGIOGRAPHY NECK: CPT

## 2024-09-18 PROCEDURE — 84484 ASSAY OF TROPONIN QUANT: CPT | Performed by: EMERGENCY MEDICINE

## 2024-09-18 PROCEDURE — 99205 OFFICE O/P NEW HI 60 MIN: CPT | Performed by: STUDENT IN AN ORGANIZED HEALTH CARE EDUCATION/TRAINING PROGRAM

## 2024-09-18 PROCEDURE — 93005 ELECTROCARDIOGRAM TRACING: CPT

## 2024-09-18 PROCEDURE — 85610 PROTHROMBIN TIME: CPT | Performed by: EMERGENCY MEDICINE

## 2024-09-18 PROCEDURE — 70496 CT ANGIOGRAPHY HEAD: CPT

## 2024-09-18 PROCEDURE — 99285 EMERGENCY DEPT VISIT HI MDM: CPT | Performed by: EMERGENCY MEDICINE

## 2024-09-18 PROCEDURE — 85027 COMPLETE CBC AUTOMATED: CPT | Performed by: EMERGENCY MEDICINE

## 2024-09-18 PROCEDURE — 80053 COMPREHEN METABOLIC PANEL: CPT | Performed by: EMERGENCY MEDICINE

## 2024-09-18 PROCEDURE — 71045 X-RAY EXAM CHEST 1 VIEW: CPT

## 2024-09-18 PROCEDURE — 85730 THROMBOPLASTIN TIME PARTIAL: CPT | Performed by: EMERGENCY MEDICINE

## 2024-09-18 PROCEDURE — 96374 THER/PROPH/DIAG INJ IV PUSH: CPT

## 2024-09-18 PROCEDURE — 99285 EMERGENCY DEPT VISIT HI MDM: CPT

## 2024-09-18 PROCEDURE — 83880 ASSAY OF NATRIURETIC PEPTIDE: CPT | Performed by: EMERGENCY MEDICINE

## 2024-09-18 PROCEDURE — 94760 N-INVAS EAR/PLS OXIMETRY 1: CPT

## 2024-09-18 RX ORDER — ALBUTEROL SULFATE 90 UG/1
2 INHALANT RESPIRATORY (INHALATION) EVERY 6 HOURS PRN
Status: DISCONTINUED | OUTPATIENT
Start: 2024-09-18 | End: 2024-09-22 | Stop reason: HOSPADM

## 2024-09-18 RX ORDER — ALLOPURINOL 100 MG/1
50 TABLET ORAL EVERY OTHER DAY
Status: DISCONTINUED | OUTPATIENT
Start: 2024-09-19 | End: 2024-09-22 | Stop reason: HOSPADM

## 2024-09-18 RX ORDER — INSULIN LISPRO 100 [IU]/ML
1-5 INJECTION, SOLUTION INTRAVENOUS; SUBCUTANEOUS
Status: DISCONTINUED | OUTPATIENT
Start: 2024-09-18 | End: 2024-09-22 | Stop reason: HOSPADM

## 2024-09-18 RX ORDER — ECHINACEA PURPUREA EXTRACT 125 MG
2 TABLET ORAL
Status: DISCONTINUED | OUTPATIENT
Start: 2024-09-18 | End: 2024-09-22 | Stop reason: HOSPADM

## 2024-09-18 RX ORDER — FUROSEMIDE 10 MG/ML
40 INJECTION INTRAMUSCULAR; INTRAVENOUS
Status: DISCONTINUED | OUTPATIENT
Start: 2024-09-18 | End: 2024-09-22 | Stop reason: HOSPADM

## 2024-09-18 RX ORDER — TAMSULOSIN HYDROCHLORIDE 0.4 MG/1
0.8 CAPSULE ORAL
Status: DISCONTINUED | OUTPATIENT
Start: 2024-09-18 | End: 2024-09-22 | Stop reason: HOSPADM

## 2024-09-18 RX ORDER — ACETAMINOPHEN 325 MG/1
650 TABLET ORAL EVERY 6 HOURS PRN
Status: DISCONTINUED | OUTPATIENT
Start: 2024-09-18 | End: 2024-09-22 | Stop reason: HOSPADM

## 2024-09-18 RX ORDER — FUROSEMIDE 10 MG/ML
40 INJECTION INTRAMUSCULAR; INTRAVENOUS
Status: DISCONTINUED | OUTPATIENT
Start: 2024-09-18 | End: 2024-09-18

## 2024-09-18 RX ORDER — FLUTICASONE FUROATE AND VILANTEROL 100; 25 UG/1; UG/1
1 POWDER RESPIRATORY (INHALATION)
Status: DISCONTINUED | OUTPATIENT
Start: 2024-09-18 | End: 2024-09-22 | Stop reason: HOSPADM

## 2024-09-18 RX ORDER — ASPIRIN 81 MG/1
81 TABLET, CHEWABLE ORAL DAILY
Status: DISCONTINUED | OUTPATIENT
Start: 2024-09-19 | End: 2024-09-22 | Stop reason: HOSPADM

## 2024-09-18 RX ORDER — PANTOPRAZOLE SODIUM 40 MG/1
40 TABLET, DELAYED RELEASE ORAL DAILY
Status: DISCONTINUED | OUTPATIENT
Start: 2024-09-18 | End: 2024-09-22 | Stop reason: HOSPADM

## 2024-09-18 RX ORDER — ATORVASTATIN CALCIUM 40 MG/1
40 TABLET, FILM COATED ORAL EVERY EVENING
Status: DISCONTINUED | OUTPATIENT
Start: 2024-09-18 | End: 2024-09-22 | Stop reason: HOSPADM

## 2024-09-18 RX ORDER — DOCUSATE SODIUM 100 MG/1
100 CAPSULE, LIQUID FILLED ORAL 2 TIMES DAILY
Status: DISCONTINUED | OUTPATIENT
Start: 2024-09-18 | End: 2024-09-22 | Stop reason: HOSPADM

## 2024-09-18 RX ORDER — FUROSEMIDE 10 MG/ML
20 INJECTION INTRAMUSCULAR; INTRAVENOUS ONCE
Status: COMPLETED | OUTPATIENT
Start: 2024-09-18 | End: 2024-09-18

## 2024-09-18 RX ADMIN — ATORVASTATIN CALCIUM 40 MG: 40 TABLET, FILM COATED ORAL at 17:04

## 2024-09-18 RX ADMIN — DOCUSATE SODIUM 100 MG: 100 CAPSULE, LIQUID FILLED ORAL at 17:05

## 2024-09-18 RX ADMIN — TAMSULOSIN HYDROCHLORIDE 0.8 MG: 0.4 CAPSULE ORAL at 17:05

## 2024-09-18 RX ADMIN — PANTOPRAZOLE SODIUM 40 MG: 40 TABLET, DELAYED RELEASE ORAL at 17:03

## 2024-09-18 RX ADMIN — IOHEXOL 75 ML: 350 INJECTION, SOLUTION INTRAVENOUS at 09:44

## 2024-09-18 RX ADMIN — FLUTICASONE FUROATE AND VILANTEROL TRIFENATATE 1 PUFF: 100; 25 POWDER RESPIRATORY (INHALATION) at 17:20

## 2024-09-18 RX ADMIN — FUROSEMIDE 20 MG: 10 INJECTION, SOLUTION INTRAMUSCULAR; INTRAVENOUS at 11:06

## 2024-09-18 RX ADMIN — TICAGRELOR 180 MG: 90 TABLET ORAL at 11:05

## 2024-09-18 RX ADMIN — FUROSEMIDE 40 MG: 10 INJECTION, SOLUTION INTRAVENOUS at 17:05

## 2024-09-18 NOTE — ASSESSMENT & PLAN NOTE
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    Evaluated by neurology, recommend continue with stroke pathway  MRI brain ordered and pending  Echo ordered and pending  Check lipid panel and HbA1c for risk stratification  Continue with aspirin  Hold home Eliquis, loaded with Brilinta, continue with Brilinta 90 twice daily in a.m.  Permissive hypertension  Continue telemetry  PT/OT  Frequent neurochecks

## 2024-09-18 NOTE — STROKE DOCUMENTATION
Oxygen saturation decreasing (85%) with patients current supine position on CT table. Oxygen administered via nasal cannula at rate of 6 L/min at this time.

## 2024-09-18 NOTE — ED PROVIDER NOTES
1. Stroke-like symptoms    2. Left leg weakness    3. Lumbar herniated disc    4. CHF, acute (HCC)      ED Disposition       ED Disposition   Admit    Condition   Stable    Date/Time   Wed Sep 18, 2024 11:23 AM    Comment   Case was discussed with Terry and the patient's admission status was agreed to be Admission Status: inpatient status to the service of Dr. Stewart .               Assessment & Plan       Medical Decision Making  Amount and/or Complexity of Data Reviewed  Labs: ordered.  Radiology: ordered.    Risk  Prescription drug management.  Decision regarding hospitalization.      Diff includes left leg weakness with some low back pain consider lumbar radiculopathy, DVT, edema, consider stroke.  Patient had remote hx of fall with chronic left leg decreased sensation and swelling.  Patient has associated SOB bloating and cough.  He has been gaining couple pounds of last couple days so he took extra dose of diuretic.  Consider CHF, pneumonia.          Stroke Assessment       Row Name 09/18/24 0936             NIH Stroke Scale    Interval Baseline      Level of Consciousness (1a.) 0      LOC Questions (1b.) 0      LOC Commands (1c.) 0      Best Gaze (2.) 0      Visual (3.) 0      Facial Palsy (4.) 0      Motor Arm, Left (5a.) 0      Motor Arm, Right (5b.) 0      Motor Leg, Left (6a.) 1      Motor Leg, Right (6b.) 0      Limb Ataxia (7.) 1      Sensory (8.) 1      Best Language (9.) 0      Dysarthria (10.) 0      Extinction and Inattention (11.) (Formerly Neglect) 0      Total 3                           Medications   iohexol (OMNIPAQUE) 350 MG/ML injection (MULTI-DOSE) 100 mL (75 mL Intravenous Given 9/18/24 0944)   ticagrelor (BRILINTA) tablet 180 mg (180 mg Oral Given 9/18/24 1105)   furosemide (LASIX) injection 20 mg (20 mg Intravenous Given 9/18/24 1106)       History of Present Illness       HPI    Hx from patient, medical records, family  members, [facility] chf, afibb arthritis, copd, dm, presents with concern  for left leg weakness.  Patient noticed it around 3 pm yesterday when he got home from [facility].  He does not recall injury it,  he has chronic bilateral lower extremity decreased sensation and chronic left leg swelling.  He has associated left low back dull pain.  He felt associated bloating and SOB but that was over last few days and he took his diuretic as he noticed weight increase couple pounds as well.  Jc h/a, cp, fever, leg pain.    Review of Systems   Constitutional:  Negative for chills and fever.   HENT:  Negative for rhinorrhea and sore throat.    Respiratory:  Positive for shortness of breath.    Cardiovascular:  Negative for chest pain.   Gastrointestinal:  Positive for abdominal distention. Negative for constipation, diarrhea, nausea and vomiting.   Genitourinary:  Negative for dysuria and frequency.   Skin:  Negative for rash.   All other systems reviewed and are negative.          Objective     ED Triage Vitals   Temperature Pulse Blood Pressure Respirations SpO2 Patient Position - Orthostatic VS   09/18/24 1015 09/18/24 0858 09/18/24 0858 09/18/24 0857 09/18/24 0858 09/18/24 0858   98.4 °F (36.9 °C) 75 137/91 18 91 % Lying      Temp Source Heart Rate Source BP Location FiO2 (%) Pain Score    09/18/24 1015 09/18/24 0858 09/18/24 0858 -- --    Oral Monitor Left arm          Physical Exam  Vitals and nursing note reviewed.   Constitutional:       Appearance: He is well-developed.   HENT:      Head: Normocephalic and atraumatic.      Right Ear: External ear normal.      Left Ear: External ear normal.      Nose: Nose normal.   Eyes:      Conjunctiva/sclera: Conjunctivae normal.      Pupils: Pupils are equal, round, and reactive to light.   Cardiovascular:      Rate and Rhythm: Normal rate and regular rhythm.      Heart sounds: Normal heart sounds.   Pulmonary:      Effort: Pulmonary effort is normal. No respiratory distress.      Breath sounds: Decreased breath sounds present. No wheezing.    Abdominal:      General: Bowel sounds are normal. There is no distension.      Palpations: Abdomen is soft.      Tenderness: There is no abdominal tenderness.   Musculoskeletal:         General: No deformity. Normal range of motion.      Cervical back: Normal range of motion and neck supple. No bony tenderness. No spinous process tenderness.      Thoracic back: No bony tenderness.      Lumbar back: Tenderness present. No bony tenderness.        Back:       Right lower leg: No tenderness.      Left lower leg: No tenderness. Edema present.   Skin:     General: Skin is warm and dry.      Findings: No rash.   Neurological:      General: No focal deficit present.      Mental Status: He is alert and oriented to person, place, and time.      GCS: GCS eye subscore is 4. GCS verbal subscore is 5. GCS motor subscore is 6.      Cranial Nerves: Cranial nerves 2-12 are intact.      Sensory: Sensory deficit present.      Motor: Weakness present.      Deep Tendon Reflexes:      Reflex Scores:       Patellar reflexes are 2+ on the right side and 2+ on the left side.     Comments: Sensation decreased left lower extremity compared to right    Weak left hip flexion.  Unable to do heel shin left heel   Psychiatric:         Mood and Affect: Mood normal.         Labs Reviewed   CBC AND PLATELET - Abnormal       Result Value    WBC 8.25      RBC 4.67      Hemoglobin 13.2      Hematocrit 42.0      MCV 90      MCH 28.3      MCHC 31.4      RDW 17.2 (*)     Platelets 229      MPV 8.9     PROTIME-INR - Abnormal    Protime 20.6 (*)     INR 1.72 (*)     Narrative:     INR Therapeutic Range    Indication                                             INR Range      Atrial Fibrillation                                               2.0-3.0  Hypercoagulable State                                    2.0.2.3  Left Ventricular Asist Device                            2.0-3.0  Mechanical Heart Valve                                  -    Aortic(with afib,  MI, embolism, HF, LA enlargement,    and/or coagulopathy)                                     2.0-3.0 (2.5-3.5)     Mitral                                                             2.5-3.5  Prosthetic/Bioprosthetic Heart Valve               2.0-3.0  Venous thromboembolism (VTE: VT, PE        2.0-3.0   APTT - Abnormal    PTT 36 (*)    HS TROPONIN I 0HR - Abnormal    hs TnI 0hr 67 (*)    COMPREHENSIVE METABOLIC PANEL - Abnormal    Sodium 143      Potassium 4.0      Chloride 104      CO2 30      ANION GAP 9      BUN 45 (*)     Creatinine 2.49 (*)     Glucose 237 (*)     Calcium 9.2      AST 20      ALT 16      Alkaline Phosphatase 72      Total Protein 7.2      Albumin 3.7      Total Bilirubin 1.21 (*)     eGFR 24      Narrative:     National Kid[employer] Disease Foundation guidelines for Chronic Kidney Disease (CKD):     Stage 1 with normal or high GFR (GFR > 90 mL/min/1.73 square meters)    Stage 2 Mild CKD (GFR = 60-89 mL/min/1.73 square meters)    Stage 3A Moderate CKD (GFR = 45-59 mL/min/1.73 square meters)    Stage 3B Moderate CKD (GFR = 30-44 mL/min/1.73 square meters)    Stage 4 Severe CKD (GFR = 15-29 mL/min/1.73 square meters)    Stage 5 End Stage CKD (GFR <15 mL/min/1.73 square meters)  Note: GFR calculation is accurate only with a steady state creatinine   D-DIMER, QUANTITATIVE - Abnormal    D-Dimer, Quant 1.12 (*)     Narrative:     In the evaluation for possible pulmonary embolism, in the appropriate (Well's Score of 4 or less) patient, the age adjusted d-dimer cutoff for this patient can be calculated as:    Age x 0.01 (in ug/mL) for Age-adjusted D-dimer exclusion threshold for a patient over 50 years.   B-TYPE NATRIURETIC PEPTIDE (BNP) - Abnormal    BNP 1,532 (*)    HS TROPONIN I 2HR - Abnormal    hs TnI 2hr 66 (*)     Delta 2hr hsTnI -1     POCT GLUCOSE - Abnormal    POC Glucose 224 (*)    TSH, 3RD GENERATION - Normal    TSH 3RD GENERATON 0.736     HS TROPONIN I 4HR     CT spine lumbar without contrast    Final Interpretation by Lasha Valadez DO (09/18 1013)      Diffuse lumbar spondylitic degenerative change with annular bulging, osteophyte formation and annular calcifications. Multilevel moderate canal stenosis and foraminal narrowing from L2-3 through L5-S1.      At L5-S1 foraminal narrowing is more severe. At L3-4 and L4-5 there is moderate bilateral foraminal narrowing including mass effect upon the exiting nerves, worst at the L3-4 level.      Workstation performed: [id number]         CT stroke alert brain   Final Interpretation by Lasha Valadez DO (09/18 1008)      Stable chronic microangiopathic change with no mass, hemorrhage or definitive signs of acute territorial infarction.      Findings were directly discussed with Steven Mckeon at approximately 10:00 a.m.      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 The V4 segment on the left also demonstrates either high-grade stenosis or short segment occlusion of the hypoplastic intracranial portion of the vessel.      The right vertebral artery demonstrates a moderate to high-grade origin stenosis and moderate stenosis intracranially.      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         Findings were directly discussed with Steven Mckeon at approximately 10:00 a.m.      Workstation performed: [id number]         XR chest portable   Final Interpretation by Jose Miguel Fernandez MD (09/18 0935)      Cardiomegaly with pulmonary vascular congestion.      Questionable small area of left lung infiltrate and left pleural effusion. Recommend continued follow-up.                  Workstation performed: [id number]         VAS VENOUS DUPLEX -LOWER LIMB UNILATERAL    (Results Pending)   MRI inpatient order    (Results Pending)       ECG 12 Lead Documentation  Only    Date/Time: 9/18/2024 12:35 PM    Performed by: Fito Izquierdo DO  Authorized by: Fito Izquierdo DO    Indications / Diagnosis:  Sob  ECG reviewed by me, the ED Provider: yes    Patient location:  ED  Previous ECG:     Previous ECG:  Compared to current    Comparison ECG info:  11-21    Similarity:  No change  Interpretation:     Interpretation: abnormal    Rate:     ECG rate:  70    ECG rate assessment: normal    Rhythm:     Rhythm: paced    Pacing:     Capture:  Complete    Type of pacing:  Ventricular  Ectopy:     Ectopy: none    ST segments:     ST segments:  Normal  T waves:     T waves: normal    Comments:      This EKG was interpreted by me.         Fito Izquierdo DO  09/18/24 1525

## 2024-09-18 NOTE — ASSESSMENT & PLAN NOTE
>>ASSESSMENT AND PLAN FOR ACUTE CVA (CEREBROVASCULAR ACCIDENT) (HCC) WRITTEN ON 9/18/2024 11:03 AM BY AN ROBER MAC    72 y/o male with Afib on Eliquis, arthritis, CHF, CKD, COPD, DM, HTN, HLD, recent COVID infection, pacemaker, NANCY on CPAP, who presented on 9/18 with LLE weakness since 9/17. Stroke alert was initiated. BP on presentation 137/91. NIHSS 3- LLE weakness and L sensory deficit. Patient was deemed not a TNK candidate due to being outside of the appropriate time window/bleeding risk on Eliquis.    Workup:  -CT head: Stable chronic microangiopathic change with no mass, hemorrhage or definitive signs of acute territorial infarction.   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 The V4 segment on the left also demonstrates either high-grade stenosis or short segment occlusion of the hypoplastic intracranial portion of the vessel.  The right vertebral artery demonstrates a moderate to high-grade origin stenosis and moderate stenosis intracranially.  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  -Labs: troponin 67, creatinine 2.49, d-dimer 1.12, BNP 1532    Plan:  - Stroke pathway  MRI brain  Echo  Lipid Panel  Hemoglobin A1c  Aspirin 81 mg- start tomorrow as patient already took his AM dose  Give Brilinta 180 mg x 1, then start Brilinta 90 mg BID tomorrow AM  Hold Eliquis for now   Atorvastatin 40 mg  Permissive HTN, labetalol if SBP >180  x 24 hours from symptom onset; then goal normotension, avoid hypotension  Continue telemetry  PT/OT/ST  Frequent neuro checks. Continue to monitor and notify neurology with any changes.   - MRI lumbar spine  - Medical management and supportive care per primary team. Correction of any metabolic or infectious disturbances.

## 2024-09-18 NOTE — ASSESSMENT & PLAN NOTE
Lab Results   Component Value Date    HGBA1C 5.2 03/15/2021       Recent Labs     09/18/24  0931   POCGLU 224*       Blood Sugar Average: Last 72 hrs:  (P) 224    Continue with insulin on sliding scale.  If well-controlled, check glucose with daily BMPs only

## 2024-09-18 NOTE — ASSESSMENT & PLAN NOTE
Wt Readings from Last 3 Encounters:   09/18/24 105 kg (231 lb 7.7 oz)   09/10/24 108 kg (237 lb 6.4 oz)   10/27/23 119 kg (261 lb 12.8 oz)     Chest x-ray with pulmonary venous congestion   BNP-1532    Home regimen torsemide 40 mg daily, recently decreased to 20 mg daily  Patient reports compliance with regimen    Will continue with IV Lasix 40 twice daily  Daily weights and strict I's and O's  Salt restriction  Appreciate cardiology's assistance

## 2024-09-18 NOTE — CONSULTS
Consult - Cardiology   Dangelo Cooper 73 y.o. male MRN: [id number]  Unit/Bed#: -01 Encounter: [id number]            ASSESSMENT:  Acute on chronic HFpEF  BNP 1532  CXR: pulm venous congestion  Home diuretic: torsemide 40mg QD (ran out of Rx and placed on 20mg for the past 1 week)  Permanent atrial fibrillation s/p ablation x2 and CV x2  11-beat run of NSVT vs aberrancy noted on telemetry, pacer rhythm  SSS s/p SC MDT PPM (2020)  Hx of Pacer induced cardiomyopathy (reported as low as 40% in 2022) s/p BiV upgrade (8/26/2022)  Moderate-severe MR -- conservatively managed  Secundum ASD  Hypertension  Hyperlipidemia  JOSH on CKD3 -- likely cardiorenal from #1     PLAN/ DISCUSSION:     Start IV lasix 40mg BID with high BP hold parameters to allow more permissive hypertension  Follow BMP, daily weights, I/O monitoring (has capone cath)   Restrict Na to <2GM and fluids to <1.8L/ daily  Resume Eliquis 5mg BID when feasible   Prefer resuming Toprol XL once able, followed by remainder of antihypertensives         History of Present Illness:  Dangelo Cooper is a 73 y.o. male with history of chronic HFpEF, moderate MR, PFO, HTN, PAF, SSS with MDT PPM, CKD3, follows with Dr. Elaine  Presents with leg weakness/heaviness over the past 72 hours prompting arrival to [facility].  He is being w/u by neurology for possible acute CVA. He was found with ischemic CV disease on CT head. He has brain MR that is pending at the time of this consult.  He was deemed non-TNK candidate due to being on Eliquis and affirms compliance with this without any missed doses. Eliquis is now on hold due to risk of hemorrhagic conversion. He was started on Brilinta in addition to his home dose ASA.   His workup in the ED found objective findings for acute CHF with elevated BNP, vascular congestion on CXR. He become short of breath with reclining back to about 30 degrees with is new. Usually can lay in bed at night without issue. He has LLE edema at  baseline due to prior L hip injury and denies any acute worsening. He denies dyspnea at rest and with mild exertion.   Of note, reports that his torsemide was unintentionally reduced to 20mg (from 40mg QD) since last week. [facility] but [facility]semide acutely. He called his nephrologist who Rx for 20mg QD tabs.           Past Medical History:        Past Medical History:   Diagnosis Date    A-fib (HCC)     Arthritis     CHF (congestive heart failure) (HCC)     Chronic kidney disease don't remember    COPD (chronic obstructive pulmonary disease) (HCC)     Diabetes mellitus (HCC)     Enlarged prostate     Gout     Heart murmur birth    Hyperlipidemia     Hypertension       Past Surgical History:   Procedure Laterality Date    A-V CARDIAC PACEMAKER INSERTION  08/2022    EAR SURGERY      EYE SURGERY  2021    cat. repaired    FRACTURE SURGERY          Allergy:        Allergies   Allergen Reactions    Bee Venom Anaphylaxis    Enalapril Other (See Comments)     unknown    Morphine GI Intolerance       Medications:       Prior to Admission medications    Medication Sig Start Date End Date Taking? Authorizing Provider   albuterol (PROVENTIL HFA,VENTOLIN HFA) 90 mcg/act inhaler INHALE 2 PUFFS BY MOUTH FOUR TIMES A DAY AS NEEDED FOR BREATHING 10/19/20   Historical Provider, MD   allopurinol (ZYLOPRIM) 100 mg tablet Take 100 mg by mouth daily      Historical Provider, MD   amLODIPine (NORVASC) 10 mg tablet Take 5 mg by mouth daily Patient cuts 10mg pill in half. 5mg    Historical Provider, MD   amoxicillin (AMOXIL) 500 mg capsule Take 1 capsule (500 mg total) by mouth every 8 (eight) hours for 7 days 9/10/24 9/17/24  ROBER Abdalla   apixaban (ELIQUIS) 5 mg Take 5 mg by mouth 2 (two) times a day    Historical Provider, MD   aspirin 81 mg chewable tablet Chew 81 mg daily    Historical Provider, MD   brompheniramine-pseudoephedrine-DM 30-2-10 MG/5ML syrup Take 5 mL by mouth 4 (four)  times a day as needed for congestion, cough or allergies 9/5/24   Moe Solo DO   cholecalciferol (VITAMIN D3) 400 units tablet Take 400 Units by mouth daily    Historical Provider, MD   Empagliflozin 25 MG TABS Take 12.5 mg by mouth in the morning 11/1/22   Historical Provider, MD   ferrous sulfate 325 (65 Fe) mg tablet Take 325 mg by mouth daily with breakfast    Historical Provider, MD   fluticasone-salmeterol (Advair) 250-50 mcg/dose inhaler Inhale 1 puff 2 (two) times a day Rinse mouth after use.    Historical Provider, MD   folic acid (FOLVITE) 1 mg tablet Take 1 mg by mouth daily     Historical Provider, MD   hydrALAZINE (APRESOLINE) 50 mg tablet Take 1 tablet (50 mg total) by mouth 2 (two) times a day 11/3/21   Nancy Sosa PA-C   losartan (COZAAR) 50 mg tablet Take 100 mg by mouth daily     Historical Provider, MD   LUTEIN PO Take by mouth    Historical Provider, MD   metoprolol succinate (TOPROL-XL) 50 mg 24 hr tablet Take 50 mg by mouth daily    Historical Provider, MD   multivitamin (THERAGRAN) TABS Take 1 tablet by mouth daily    Historical Provider, MD   Nutritional Supplements (VITAMIN D BOOSTER PO) Take 2,000 Units by mouth     Historical Provider, MD   pantoprazole (PROTONIX) 40 mg tablet Take 40 mg by mouth daily    Historical Provider, MD   potassium chloride (K-DUR,KLOR-CON) 20 mEq tablet Take 20 mEq by mouth daily    Historical Provider, MD   simvastatin (ZOCOR) 10 mg tablet Take 10 mg by mouth daily at bedtime    Historical Provider, MD   sodium chloride (OCEAN) 0.65 % nasal spray 1 spray into each nostril 2 (two) times a day as needed    Historical Provider, MD   tamsulosin (FLOMAX) 0.4 mg Take 0.8 mg by mouth daily with dinner    Historical Provider, MD   torsemide (DEMADEX) 20 mg tablet Take 3 tablets (60 mg total) by mouth daily 11/4/21   Nancy Sosa PA-C       Family History:     Family History   Problem Relation Age of Onset    Dementia Mother     Diabetes Mother     Cancer Father          Social History:       Social History     Socioeconomic History    Marital status: /Civil Union     Spouse name: None    Number of children: None    Years of education: None    Highest education level: None   Occupational History    None   Tobacco Use    Smoking status: Former     Types: Pipe    Smokeless tobacco: Never   Vaping Use    Vaping status: Never Used   Substance and Sexual Activity    Alcohol use: Yes     Alcohol/week: 2.0 standard drinks of alcohol     Types: 1 Cans of beer, 1 Standard drinks or equivalent per week     Comment: occassional    Drug use: No    Sexual activity: Not Currently     Partners: Female   Other Topics Concern    None   Social History Narrative    None     Social Determinants of Health     Financial Resource Strain: Low Risk  (10/21/2023)    Overall Financial Resource Strain (CARDIA)     Difficulty of Paying Living Expenses: Not hard at all   Food Insecurity: Not on file   Transportation Needs: No Transportation Needs (10/21/2023)    PRAPARE - Transportation     Lack of Transportation (Medical): No     Lack of Transportation (Non-Medical): No   Physical Activity: Inactive (11/8/2021)    Exercise Vital Sign     Days of Exercise per Week: 0 days     Minutes of Exercise per Session: 0 min   Stress: No Stress Concern Present (11/8/2021)    Citizen of [place] of Occupational Health - Occupational Stress Questionnaire     Feeling of Stress : Not at all   Social Connections: Not on file   Intimate Partner Violence: Not At Risk (10/27/2023)    Humiliation, Afraid, Rape, and Kick questionnaire     Fear of Current or Ex-Partner: No     Emotionally Abused: No     Physically Abused: No     Sexually Abused: No   Housing Stability: Not on file       Weights/BMI:    Wt Readings from Last 3 Encounters:   09/18/24 105 kg (230 lb 6.4 oz)   09/10/24 108 kg (237 lb 6.4 oz)   10/27/23 119 kg (261 lb 12.8 oz)   , Body mass index is 32.13 kg/m².      ROS:  14 point ROS negative except as  outlined above  Remainder review of systems is negative    Exam:  General: Alert, oriented and in no acute distress, cooperative  Head: Normocephalic, atraumatic.  Eyes: PERRLA. No icterus. Normal Conjunctiva.   Oropharynx: Moist and normal-appearing mucosa  Neck: Supple, symmetrical, trachea midline, JVD not appreciated.   Heart: RRR, no murmur, rub or gallop, S1 & S2 normal   Lungs: Normal air entry, lungs clear to auscultation and no rales, rhonchi or wheezing   Abdomen: Flat, normal findings: bowel sounds normal and soft, non-tender  Lower Limbs:  No pitting edema, 2+ peripheral pulses, capillary refill within normal limits  Musculoskeletal: ROM grossly normal

## 2024-09-18 NOTE — RESPIRATORY THERAPY NOTE
RT Protocol Note  Dangelo Cooper 73 y.o. male MRN: [id number]  Unit/Bed#: -01 Encounter: [id number]    Assessment    Principal Problem:    Stroke-like symptoms  Active Problems:    Chronic obstructive pulmonary disease (COPD) ([facility])    Type 2 diabetes mellitus, without long-term current use of insulin ([facility])    Essential hypertension    Acute HFrEF (heart failure with reduced ejection fraction) ([facility])    CKD (chronic kidney disease) stage 4, GFR 15-29 ml/min ([facility])    Obstructive sleep apnea    Hypoxia    Pacemaker    A-fib ([facility])    Non-ischemic myocardial injury (non-traumatic)    Lumbar radiculopathy      Home Pulmonary Medications:  Albuterol MDI, Advair    Home Devices/Therapy: BiPAP/CPAP    Past Medical History:   Diagnosis Date    A-fib ([facility])     Arthritis     CHF (congestive heart failure) ([facility])     Chronic kidney disease don't remember    COPD (chronic obstructive pulmonary disease) ([facility])     Diabetes mellitus ([facility])     Enlarged prostate     Gout     Heart murmur birth    Hyperlipidemia     Hypertension      Social History     Socioeconomic History    Marital status: /Civil Union     Spouse name: None    Number of children: None    Years of education: None    Highest education level: None   Occupational History    None   Tobacco Use    Smoking status: Former     Types: Pipe    Smokeless tobacco: Never   Vaping Use    Vaping status: Never Used   Substance and Sexual Activity    Alcohol use: Yes     Alcohol/week: 2.0 standard drinks of alcohol     Types: 1 Cans of beer, 1 Standard drinks or equivalent per week     Comment: occassional    Drug use: No    Sexual activity: Not Currently     Partners: Female   Other Topics Concern    None   Social History Narrative    None     Social Determinants of Health     Financial Resource Strain: Low Risk  (10/21/2023)    Overall Financial Resource Strain (CARDIA)     Difficulty of Paying Living Expenses: Not hard at all   Food Insecurity: Not on file  "  Transportation Needs: No Transportation Needs (10/21/2023)    PRAPARE - Transportation     Lack of Transportation (Medical): No     Lack of Transportation (Non-Medical): No   Physical Activity: Inactive (11/8/2021)    Exercise Vital Sign     Days of Exercise per Week: 0 days     Minutes of Exercise per Session: 0 min   Stress: No Stress Concern Present (11/8/2021)    [place] of Occupational Health - Occupational Stress Questionnaire     Feeling of Stress : Not at all   Social Connections: Not on file   Intimate Partner Violence: Not At Risk (10/27/2023)    Humiliation, Afraid, Rape, and Kick questionnaire     Fear of Current or Ex-Partner: No     Emotionally Abused: No     Physically Abused: No     Sexually Abused: No   Housing Stability: Not on file       Subjective         Objective    Physical Exam:   Assessment Type: Assess only  General Appearance: Alert, Awake  Respiratory Pattern: Normal  Chest Assessment: Chest expansion symmetrical  Bilateral Breath Sounds: Clear  Cough: None  O2 Device: None    Vitals:  Blood pressure (!) 163/103, pulse 81, temperature 98.2 °F (36.8 °C), resp. rate 18, height 5' 11\" (1.803 m), weight 105 kg (231 lb 7.7 oz), SpO2 93%.          Imaging and other studies: Reviewed radiology reports from this admission including: chest xray.    O2 Device: None     Plan    Respiratory Plan: Home Bronchodilator Patient pathway        Resp Comments: Patient states that he wears cpap with auto pap setting from 17 to 21cm H2O at home. No respiratory disress is noted upon assessment. Continue daily Breo and Q6PRN 2 puffs Albuterol MDI as ordered to [facility] for home regimen while in hospital.   "

## 2024-09-18 NOTE — PHYSICAL THERAPY NOTE
Physical Therapy Cancellation Note       09/18/24 1406   PT Last Visit   PT Visit Date 09/18/24   Note Type   Note type Cancelled Session   Additional Comments Off floor for test and awaiting read of venous duplex.  Will contiue to follow     Verena Sahu

## 2024-09-18 NOTE — OCCUPATIONAL THERAPY NOTE
Occupational Therapy Cancellation    Patient Name: Dangelo Cooper  Today's Date: 9/18/2024 09/18/24 1406   OT Last Visit   OT Visit Date 09/18/24   Note Type   Note type Cancelled Session   Cancel Reasons Patient off floor/test   Additional Comments OT orders received and chart reviewed. Pt currently CHER for testing. Also awaiting read of venous duplex. Will continue to follow.     Rosaura Pinedo MS, OTR/L

## 2024-09-18 NOTE — ASSESSMENT & PLAN NOTE
74 y/o male with Afib on Eliquis, arthritis, CHF, CKD, COPD, DM, HTN, HLD, recent COVID infection, pacemaker, NANCY on CPAP, who presented on 9/18 with LLE weakness since 9/17. Stroke alert was initiated. BP on presentation 137/91. NIHSS 3- LLE weakness and L sensory deficit. Patient was deemed not a TNK candidate due to being outside of the appropriate time window/bleeding risk on Eliquis.    Workup:  -CT head: Stable chronic microangiopathic change with no mass, hemorrhage or definitive signs of acute territorial infarction.   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 The V4 segment on the left also demonstrates either high-grade stenosis or short segment occlusion of the hypoplastic intracranial portion of the vessel.  The right vertebral artery demonstrates a moderate to high-grade origin stenosis and moderate stenosis intracranially.  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  -Labs: troponin 67, creatinine 2.49, d-dimer 1.12, BNP 1532    Plan:  - Stroke pathway  MRI brain  Echo  Lipid Panel  Hemoglobin A1c  Aspirin 81 mg- start tomorrow as patient already took his AM dose  Give Brilinta 180 mg x 1, then start Brilinta 90 mg BID tomorrow AM  Hold Eliquis for now   Atorvastatin 40 mg  Permissive HTN, labetalol if SBP >180  x 24 hours from symptom onset; then goal normotension, avoid hypotension  Continue telemetry  PT/OT/ST  Frequent neuro checks. Continue to monitor and notify neurology with any changes.   - MRI lumbar spine  - Medical management and supportive care per primary team. Correction of any metabolic or infectious disturbances.

## 2024-09-18 NOTE — PLAN OF CARE
Problem: Neurological Deficit  Goal: Neurological status is stable or improving  Description: Interventions:  - Monitor and assess patient's level of consciousness, motor function, sensory function, and level of assistance needed for ADLs.   - Monitor and report changes from baseline. Collaborate with interdisciplinary team to initiate plan and implement interventions as ordered.   - Provide and maintain a safe environment.  - Consider seizure precautions.  - Consider fall precautions.  - Consider aspiration precautions.  - Consider bleeding precautions.  Outcome: Progressing     Problem: Potential for Aspiration  Goal: Non-ventilated patient's risk of aspiration is minimized  Description: Assess and monitor vital signs, respiratory status, and labs (WBC).  Monitor for signs of aspiration (tachypnea, cough, rales, wheezing, cyanosis, fever).    - Assess and monitor patient's ability to swallow.  - Place patient up in chair to eat if possible.  - HOB up at 90 degrees to eat if unable to get patient up into chair.  - Supervise patient during oral intake.   - Instruct patient/ family to take small bites.  - Instruct patient/ family to take small single sips when taking liquids.  - Follow patient-specific strategies generated by speech pathologist.  Outcome: Progressing  Goal: Ventilated patient's risk of aspiration is minimized  Description: Assess and monitor vital signs, respiratory status, airway cuff pressure, and labs (WBC).  Monitor for signs of aspiration (tachypnea, cough, rales, wheezing, cyanosis, fever).    - Elevate head of bed 30 degrees if patient has tube feeding.  - Monitor tube feeding.  Outcome: Progressing     Problem: Nutrition  Goal: Nutrition/Hydration status is improving  Description: Monitor and assess patient's nutrition/hydration status for malnutrition (ex- brittle hair, bruises, dry skin, pale skin and conjunctiva, muscle wasting, smooth red tongue, and disorientation). Collaborate with  interdisciplinary team and initiate plan and interventions as ordered.  Monitor patient's weight and dietary intake as ordered or per policy. Utilize nutrition screening tool and intervene per policy. Determine patient's food preferences and provide high-protein, high-caloric foods as appropriate.     - Assist patient with eating.  - Allow adequate time for meals.  - Encourage patient to take dietary supplement as ordered.  - Collaborate with clinical nutritionist.  - Include patient/family/caregiver in decisions related to nutrition.  Outcome: Progressing

## 2024-09-18 NOTE — ASSESSMENT & PLAN NOTE
CT with - Diffuse lumbar spondylitic degenerative change with annular bulging, osteophyte formation and annular calcifications. Multilevel moderate canal stenosis and foraminal narrowing from L2-3 through L5-S1.  At L5-S1 foraminal narrowing is more severe. At L3-4 and L4-5 there is moderate bilateral foraminal narrowing including mass effect upon the exiting nerves, worst at the L3-4 level    Discussed with neurosurgery, recommended getting MRI lumbar spine  Patient denies any red flag symptoms  Continue with conservative management for now

## 2024-09-18 NOTE — ASSESSMENT & PLAN NOTE
Does not appear to be in exacerbation  On 2 L of oxygen secondary to volume overload  Continue with home bronchodilator therapy  Respiratory protocol

## 2024-09-18 NOTE — H&P
H&P - Hospitalist   Name: Dangelo Cooper 73 y.o. 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    Evaluated by neurology, recommend continue with stroke pathway  MRI brain ordered and pending  Echo ordered and pending  Check lipid panel and HbA1c for risk stratification  Continue with aspirin  Hold home Eliquis, loaded with Brilinta, continue with Brilinta 90 twice daily in a.m.  Permissive hypertension  Continue telemetry  PT/OT  Frequent neurochecks  A-fib (formerly [facility])  With history of paroxysmal atrial fibrillation  Home regimen consists of Toprol XL 50 mg daily and Eliquis 5 mg twice daily  Hold Eliquis  Hold Toprol-XL to allow for permissive hypertension in the setting of strokelike symptoms  Continue with tele  Chronic obstructive pulmonary disease (COPD) (formerly [facility])  Does not appear to be in exacerbation  On 2 L of oxygen secondary to volume overload  Continue with home bronchodilator therapy  Respiratory protocol  Type 2 diabetes mellitus, without long-term current use of insulin (formerly [facility])  Lab Results   Component Value Date    HGBA1C 5.2 03/15/2021       Recent Labs     09/18/24  0931   POCGLU 224*       Blood Sugar Average: Last 72 hrs:  (P) 224    Continue with insulin on sliding scale.  If  "well-controlled, check glucose with daily BMPs only  Essential hypertension  /92   Pulse 76   Temp 98.4 °F (36.9 °C) (Oral)   Resp 20   Ht 5' 11\" (1.803 m)   Wt 105 kg (231 lb 7.7 oz)   SpO2 98%   BMI 32.29 kg/m²     Hold home regimen of amlodipine, losartan, hydralazine, Toprol-XL and torsemide  IV diuretics for now  Allow for permissive hypertension  Acute HFrEF (heart failure with reduced ejection fraction) ([facility])  Wt Readings from Last 3 Encounters:   09/18/24 105 kg (231 lb 7.7 oz)   09/10/24 108 kg (237 lb 6.4 oz)   10/27/23 119 kg (261 lb 12.8 oz)     Chest x-ray with pulmonary venous congestion   BNP-1532    Home regimen torsemide 40 mg daily, recently decreased to 20 mg daily  Patient reports compliance with regimen    Will continue with IV Lasix 40 twice daily  Daily weights and strict I's and O's  Salt restriction  Appreciate cardiology's assistance        CKD (chronic kidney disease) stage 4, GFR 15-29 ml/min ([facility])  Lab Results   Component Value Date    EGFR 24 09/18/2024    EGFR 27 (L) 08/17/2022    EGFR 30 11/08/2021    CREATININE 2.49 (H) 09/18/2024    CREATININE 2.45 (H) 08/17/2022    CREATININE 2.16 (H) 11/08/2021     Urinary retention protocol  Monitor BUN/creatinine while on IV diuretics  Hold losartan     Obstructive sleep apnea  Continue with CPAP   Hypoxia  Not on oxygen at baseline  Now on 2 litres   CXR with pulmonary venous congestion   Expect improvement with IV diuretics  Respiratory protocol  Pacemaker  noted  Non-ischemic myocardial injury (non-traumatic)  Lab Results   Component Value Date    HSTNI0 67 (H) 09/18/2024    HSTNI2 66 (H) 09/18/2024    HSTNID2 -1 09/18/2024      Secondary to acute heart failure  Denies any chest pain  Continue with telemetry monitoring  Cardiology following  Lumbar radiculopathy  CT with - Diffuse lumbar spondylitic degenerative change with annular bulging, osteophyte formation and annular calcifications. Multilevel moderate canal stenosis and " foraminal narrowing from L2-3 through L5-S1.  At L5-S1 foraminal narrowing is more severe. At L3-4 and L4-5 there is moderate bilateral foraminal narrowing including mass effect upon the exiting nerves, worst at the L3-4 level    Discussed with neurosurgery, recommended getting MRI lumbar spine  Patient denies any red flag symptoms  Continue with conservative management for now    VTE Pharmacologic Prophylaxis:    brilinta   Code Status: Level 1 - Full Code full  Discussion with family: Updated  (wife and daughter) at bedside.    Anticipated Length of Stay: Patient will be admitted on an inpatient basis with an anticipated length of stay of greater than 2 midnights secondary to strokelike symptoms, acute CHF.    History of Present Illness   Chief Complaint:   Chief Complaint   Patient presents with    Extremity Weakness     Pt reports left leg weakness since yesterday. He notices pain in his lower back when he lifts left leg. No trauma          Dangelo Cooper is a 73 y.o. male with a PMH of HFrEF, paroxysmal atrial fibrillation, hypertension, hyperlipidemia, status post pacemaker placement, NANCY on CPAP, prior history of TIA presented with left lower extremity weakness/numbness.  On my encounter, he is awake, alert and hemodynamically stable.  He endorses left lower extremity weakness/heaviness.  He denies any urinary incontinence, bowel incontinence or saddle anesthesia.  He endorses back ache which is chronic.  He denies any fevers and chills.  He also endorses chest tightness.  He stated that recently his torsemide dose was decreased from 40 to 20 mg daily.  Patient normally is not on oxygen, and currently requiring 2 L of oxygen.  Wife and daughter updated at bedside.  Patient confirmed level 1 full CODE STATUS..    Review of Systems   Constitutional:  Positive for activity change and fatigue. Negative for chills and fever.   HENT:  Negative for congestion.    Respiratory:  Positive for shortness  of breath. Negative for cough and wheezing.    Cardiovascular:  Positive for leg swelling. Negative for chest pain and palpitations.   Gastrointestinal:  Negative for abdominal pain.   Genitourinary:  Negative for dysuria.   Musculoskeletal:  Positive for arthralgias and back pain.   Neurological:  Positive for weakness.   Psychiatric/Behavioral:  Negative for agitation and confusion.        I have reviewed the patient's PMH, PSH, Social History, Family History, Meds, and Allergies  Historical Information   Past Medical History:   Diagnosis Date    A-fib (HCC)     Arthritis     CHF (congestive heart failure) (HCC)     Chronic kidney disease don't remember    COPD (chronic obstructive pulmonary disease) (HCC)     Diabetes mellitus (HCC)     Enlarged prostate     Gout     Heart murmur birth    Hyperlipidemia     Hypertension      Past Surgical History:   Procedure Laterality Date    A-V CARDIAC PACEMAKER INSERTION  08/2022    EAR SURGERY      EYE SURGERY  2021    cat. repaired    FRACTURE SURGERY       Social History     Tobacco Use    Smoking status: Former     Types: Pipe    Smokeless tobacco: Never   Vaping Use    Vaping status: Never Used   Substance and Sexual Activity    Alcohol use: Yes     Alcohol/week: 2.0 standard drinks of alcohol     Types: 1 Cans of beer, 1 Standard drinks or equivalent per week     Comment: occassional    Drug use: No    Sexual activity: Not Currently     Partners: Female     E-Cigarette/Vaping    E-Cigarette Use Never User      E-Cigarette/Vaping Substances    Nicotine No     THC No     CBD No     Flavoring No     Other No     Unknown No      Family History   Problem Relation Age of Onset    Dementia Mother     Diabetes Mother     Cancer Father      Social History     Tobacco Use    Smoking status: Former     Types: Pipe    Smokeless tobacco: Never   Vaping Use    Vaping status: Never Used   Substance and Sexual Activity    Alcohol use: Yes     Alcohol/week: 2.0 standard drinks of  alcohol     Types: 1 Cans of beer, 1 Standard drinks or equivalent per week     Comment: occassional    Drug use: No    Sexual activity: Not Currently     Partners: Female       Current Facility-Administered Medications:     acetaminophen (TYLENOL) tablet 650 mg, Q6H PRN    albuterol (PROVENTIL HFA,VENTOLIN HFA) inhaler 2 puff, Q6H PRN    [START ON 9/19/2024] allopurinol (ZYLOPRIM) tablet 50 mg, Every Other Day    [START ON 9/19/2024] aspirin chewable tablet 81 mg, Daily    atorvastatin (LIPITOR) tablet 40 mg, QPM    docusate sodium (COLACE) capsule 100 mg, BID    Fluticasone Furoate-Vilanterol 100-25 mcg/actuation 1 puff, Daily    furosemide (LASIX) injection 40 mg, BID (diuretic)    insulin lispro (HumALOG/ADMELOG) 100 units/mL subcutaneous injection 1-5 Units, TID AC **AND** Fingerstick Glucose (POCT), TID AC    pantoprazole (PROTONIX) EC tablet 40 mg, Daily    sodium chloride (OCEAN) 0.65 % nasal spray 2 spray, Q1H PRN    tamsulosin (FLOMAX) capsule 0.8 mg, Daily With Dinner    [START ON 9/19/2024] ticagrelor (BRILINTA) tablet 90 mg, Q12H SUSAN  Prior to Admission Medications   Prescriptions Last Dose Informant Patient Reported? Taking?   LUTEIN PO  Self Yes No   Sig: Take by mouth   Nutritional Supplements (VITAMIN D BOOSTER PO)  Self Yes No   Sig: Take 2,000 Units by mouth    albuterol (PROVENTIL HFA,VENTOLIN HFA) 90 mcg/act inhaler  Self Yes No   Sig: INHALE 2 PUFFS BY MOUTH FOUR TIMES A DAY AS NEEDED FOR BREATHING   allopurinol (ZYLOPRIM) 100 mg tablet  Self Yes No   Sig: Take 100 mg by mouth daily     amLODIPine (NORVASC) 10 mg tablet  Self Yes No   Sig: Take 5 mg by mouth daily Patient cuts 10mg pill in half. 5mg   amoxicillin (AMOXIL) 500 mg capsule   No No   Sig: Take 1 capsule (500 mg total) by mouth every 8 (eight) hours for 7 days   apixaban (ELIQUIS) 5 mg  Self Yes No   Sig: Take 5 mg by mouth 2 (two) times a day   aspirin 81 mg chewable tablet  Self Yes No   Sig: Chew 81 mg daily  "  brompheniramine-pseudoephedrine-DM 30-2-10 MG/5ML syrup  Self No No   Sig: Take 5 mL by mouth 4 (four) times a day as needed for congestion, cough or allergies   cholecalciferol (VITAMIN D3) 400 units tablet  Self Yes No   Sig: Take 400 Units by mouth daily   ferrous sulfate 325 (65 Fe) mg tablet  Self Yes No   Sig: Take 325 mg by mouth daily with breakfast   fluticasone-salmeterol (Advair) 250-50 mcg/dose inhaler  Self Yes No   Sig: Inhale 1 puff 2 (two) times a day Rinse mouth after use.   folic acid (FOLVITE) 1 mg tablet  Self Yes No   Sig: Take 1 mg by mouth daily    hydrALAZINE (APRESOLINE) 50 mg tablet  Self No No   Sig: Take 1 tablet (50 mg total) by mouth 2 (two) times a day   losartan (COZAAR) 50 mg tablet  Self Yes No   Sig: Take 100 mg by mouth daily    metoprolol succinate (TOPROL-XL) 50 mg 24 hr tablet  Self Yes No   Sig: Take 50 mg by mouth daily   multivitamin (THERAGRAN) TABS  Self Yes No   Sig: Take 1 tablet by mouth daily   pantoprazole (PROTONIX) 40 mg tablet  Self Yes No   Sig: Take 40 mg by mouth daily   potassium chloride (K-DUR,KLOR-CON) 20 mEq tablet  Self Yes No   Sig: Take 20 mEq by mouth daily   simvastatin (ZOCOR) 10 mg tablet  Self Yes No   Sig: Take 10 mg by mouth daily at bedtime   sodium chloride (OCEAN) 0.65 % nasal spray  Self Yes No   Si spray into each nostril 2 (two) times a day as needed   tamsulosin (FLOMAX) 0.4 mg  Self Yes No   Sig: Take 0.8 mg by mouth daily with dinner   torsemide (DEMADEX) 20 mg tablet  Self No No   Sig: Take 3 tablets (60 mg total) by mouth daily      Facility-Administered Medications: None     Bee venom, Enalapril, and Morphine  Social History:  Marital Status: /Civil Union     Objective     Vitals:   Blood Pressure: 148/92 (24 1513)  Pulse: 76 (24 1513)  Temperature: 98.4 °F (36.9 °C) (24 1015)  Temp Source: Oral (24 1015)  Respirations: 20 (24 1210)  Height: 5' 11\" (180.3 cm) (24 1513)  Weight - Scale: " 105 kg (231 lb 7.7 oz) (09/18/24 1513)  SpO2: 98 % (09/18/24 1513)    Physical Exam  Vitals reviewed.   Constitutional:       Appearance: Normal appearance.   HENT:      Head: Atraumatic.      Mouth/Throat:      Mouth: Mucous membranes are dry.   Cardiovascular:      Rate and Rhythm: Normal rate.      Heart sounds: Normal heart sounds.   Pulmonary:      Effort: No respiratory distress.      Breath sounds: No wheezing.   Abdominal:      General: Bowel sounds are normal.      Tenderness: There is no abdominal tenderness.   Musculoskeletal:      Right lower leg: No edema.      Left lower leg: Edema present.   Skin:     General: Skin is dry.      Capillary Refill: Capillary refill takes less than 2 seconds.      Findings: Erythema and lesion present.   Neurological:      Mental Status: He is alert and oriented to person, place, and time.      Motor: Weakness present.         Lines/Drains:  Lines/Drains/Airways       Active Status       None                        Additional Data:   Lab Results: I have reviewed the following results: CBC/BMP:   .     09/18/24  0925   WBC 8.25   HGB 13.2   HCT 42.0      SODIUM 143   K 4.0      CO2 30   BUN 45*   CREATININE 2.49*   GLUC 237*    , LFTs:   .     09/18/24  0925   AST 20   ALT 16   ALB 3.7   TBILI 1.21*   ALKPHOS 72    , PTT/INR:  .     09/18/24  0925   PTT 36*   INR 1.72*    , Troponin,BNP:  .     09/18/24  0925 09/18/24  1144   HSTNI0 67*  --    HSTNI2  --  66*   BNP 1,532*  --       Results from last 7 days   Lab Units 09/18/24  0925   WBC Thousand/uL 8.25   HEMOGLOBIN g/dL 13.2   HEMATOCRIT % 42.0   PLATELETS Thousands/uL 229     Results from last 7 days   Lab Units 09/18/24  0925   SODIUM mmol/L 143   POTASSIUM mmol/L 4.0   CHLORIDE mmol/L 104   CO2 mmol/L 30   BUN mg/dL 45*   CREATININE mg/dL 2.49*   ANION GAP mmol/L 9   CALCIUM mg/dL 9.2   ALBUMIN g/dL 3.7   TOTAL BILIRUBIN mg/dL 1.21*   ALK PHOS U/L 72   ALT U/L 16   AST U/L 20   GLUCOSE RANDOM mg/dL 237*      Results from last 7 days   Lab Units 09/18/24  0925   INR  1.72*     Results from last 7 days   Lab Units 09/18/24  0931   POC GLUCOSE mg/dl 224*     Lab Results   Component Value Date    HGBA1C 5.2 03/15/2021    HGBA1C 5.6 08/20/2020           Imaging Review: Reviewed radiology reports from this admission including: chest xray, CT head, and ct lumbar spine .  Other Studies: EKG was reviewed.     Administrative Statements   I have spent a total time of 75 minutes in caring for this patient on the day of the visit/encounter including Diagnostic results, Prognosis, Patient and family education, Counseling / Coordination of care, Documenting in the medical record, Reviewing / ordering tests, medicine, procedures  , Obtaining or reviewing history  , and Communicating with other healthcare professionals .    ** Please Note: This note has been constructed using a voice recognition system. **

## 2024-09-18 NOTE — ASSESSMENT & PLAN NOTE
"/92   Pulse 76   Temp 98.4 °F (36.9 °C) (Oral)   Resp 20   Ht 5' 11\" (1.803 m)   Wt 105 kg (231 lb 7.7 oz)   SpO2 98%   BMI 32.29 kg/m²     Hold home regimen of amlodipine, losartan, hydralazine, Toprol-XL and torsemide  IV diuretics for now  Allow for permissive hypertension  "

## 2024-09-18 NOTE — ASSESSMENT & PLAN NOTE
">>ASSESSMENT AND PLAN FOR ESSENTIAL HYPERTENSION WRITTEN ON 9/18/2024  4:04 PM BY CAMMIE NAILS MD    /92   Pulse 76   Temp 98.4 °F (36.9 °C) (Oral)   Resp 20   Ht 5' 11\" (1.803 m)   Wt 105 kg (231 lb 7.7 oz)   SpO2 98%   BMI 32.29 kg/m²     Hold home regimen of amlodipine, losartan, hydralazine, Toprol-XL and torsemide  IV diuretics for now  Allow for permissive hypertension  "

## 2024-09-18 NOTE — CONSULTS
Consultation - Neurology   Name: Dangelo Cooper 73 y.o. male I MRN: [id number]  Unit/Bed#: ED 11 I Date of Admission: 9/18/2024   Date of Service: 9/18/2024 I Hospital Day: 0     Consult to Neurology  Consult performed by: ROBER Chaparro  Consult ordered by: Fito Izquierdo DO        Physician Requesting Evaluation: Fito Izquierdo DO   Reason for Evaluation / Principal Problem: Stroke-like symptoms    Assessment & Plan  Stroke-like symptoms  74 y/o male with Afib on Eliquis, arthritis, CHF, CKD, COPD, DM, HTN, HLD, recent COVID infection, pacemaker, NANCY on CPAP, who presented on 9/18 with LLE weakness since 9/17. Stroke alert was initiated. BP on presentation 137/91. NIHSS 3- LLE weakness and L sensory deficit. Patient was deemed not a TNK candidate due to being outside of the appropriate time window/bleeding risk on Eliquis.    Workup:  -CT head: Stable chronic microangiopathic change with no mass, hemorrhage or definitive signs of acute territorial infarction.   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 The V4 segment on the left also demonstrates either high-grade stenosis or short segment occlusion of the hypoplastic intracranial portion of the vessel.  The right vertebral artery demonstrates a moderate to high-grade origin stenosis and moderate stenosis intracranially.  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  -Labs: troponin 67, creatinine 2.49, d-dimer 1.12, BNP 1532    Plan:  - Stroke pathway  MRI brain  Echo  Lipid Panel  Hemoglobin A1c  Aspirin 81 mg- start tomorrow as patient already took his AM dose  Give Brilinta 180 mg x 1, then start Brilinta 90 mg BID tomorrow AM  Hold Eliquis for now   Atorvastatin 40 mg  Permissive HTN, labetalol if SBP >180  x 24 hours from symptom onset; then goal normotension, avoid hypotension  Continue telemetry  PT/OT/ST  Frequent neuro checks. Continue to monitor and notify neurology with any changes.   -  MRI lumbar spine  - Medical management and supportive care per primary team. Correction of any metabolic or infectious disturbances.   A-fib (HCC)  -On Eliquis at baseline  -Telemetry  -Medical management per primary team    Thrombolytic Decision: Patient not a candidate. Unclear time of onset outside appropriate time window. and Bleeding risk.    Recommendations for outpatient neurological follow up have yet to be determined.    Case and treatment plan reviewed with attending neurologist, Dr. Mckeon. Please see attending attestation for any further recommendations.    History of Present Illness   Patient last known well: 9/17 3pm  Stroke alert called: 9/18 9:37am  Neurology time of arrival: 9:43am  HPI: Dangelo Cooper is a 73 y.o.  male with Afib on Eliquis, arthritis, CHF, CKD, COPD, DM, HTN, HLD, recent COVID infection, pacemaker, NANCY on CPAP, who presents with stroke-like symptoms.    Patient states he woke up yesterday morning with some tingling in his R hand that lasted several minutes before resolving on its own. He came home late afternoon yesterday when he started having LLE weakness, noting he was having difficulty walking on it. At times, he also noted some L lower back pressure when walking. He denies any dizziness/lightheadedness, headaches, incontinence, vision changes, speech difficulty, UE weakness or numbness/tingling, tremors. He is on Eliquis and endorses compliance. He was using a cane to walk, which is not normal for him. The weakness persisted so he presented to the ED for further evaluation. He does report that he had COVID about 3 weeks ago and has been slowly recovering from it.    Review of Systems  A 12 system ROS was completed. Other than the above mentioned complaints in the HPI and those commented on below, all remaining systems were negative.    Objective      Temp:  [98.4 °F (36.9 °C)] 98.4 °F (36.9 °C)  HR:  [69-78] 69  Resp:  [18-22] 20  BP: (129-143)/(88-98) 143/97  O2 Device:  Nasal cannula          No intake/output data recorded.  Lines/Drains/Airways       Active Status       None                  Physical and neurologic exam performed by attending neurologist and AP:  Physical Exam  Vitals and nursing note reviewed.   Constitutional:       General: He is not in acute distress.     Appearance: He is not ill-appearing or diaphoretic.   HENT:      Head: Normocephalic.      Mouth/Throat:      Mouth: Mucous membranes are moist.      Pharynx: Oropharynx is clear.   Eyes:      General: No scleral icterus.        Right eye: No discharge.         Left eye: No discharge.      Extraocular Movements: Extraocular movements intact and EOM normal.      Conjunctiva/sclera: Conjunctivae normal.   Cardiovascular:      Rate and Rhythm: Normal rate.   Pulmonary:      Effort: Pulmonary effort is normal. No respiratory distress.   Musculoskeletal:      Left lower leg: Edema present.   Skin:     General: Skin is warm and dry.      Coloration: Skin is not jaundiced or pale.   Neurological:      Mental Status: He is alert and oriented to person, place, and time.      Coordination: Finger-Nose-Finger Test normal. Heel-to-shin test: R normal, unable to perform L due to weakness.     Deep Tendon Reflexes:      Reflex Scores:       Bicep reflexes are 1+ on the right side and 1+ on the left side.       Brachioradialis reflexes are 1+ on the right side and 1+ on the left side.       Patellar reflexes are 1+ on the left side.       Achilles reflexes are 1+ on the right side and 1+ on the left side.  Psychiatric:         Mood and Affect: Mood normal.        Neurologic Exam     Mental Status   Oriented to person, place, and time.   Level of consciousness: alert  No dysarthria or aphasia noted. Able to follow commands.     Cranial Nerves     CN II   Right visual field deficit: none  Left visual field deficit: none     CN III, IV, VI   Extraocular motions are normal.     CN V   Right facial sensation deficit: none  Left  facial sensation deficit: forehead    CN VII   Facial expression full, symmetric.     CN VIII   Hearing: intact    CN IX, X   Palate: symmetric    CN XI   CN XI normal.     CN XII   CN XII normal.     Motor Exam   Muscle bulk: normal  Able to raise bilateral UE and hold antigravity x 10 seconds without drift noted  Able to raise RLE and hold antigravity x 5 seconds without drift noted  Able to raise LLE but not fully. When manually raised, maintains LLE antigravity but with drift noted  R hip flexion 5/5  L hip flexion 4-/5     Sensory Exam   Right arm vibration: normal  Left arm vibration: normal  Right leg vibration: normal  Left leg vibration: decreased from knee  Right arm proprioception: normal  Left arm proprioception: normal  Right leg proprioception: normal  Left leg proprioception: normal  Right arm pinprick: normal  Left arm pinprick: normal  Right leg pinprick: normal  Left leg pinprick: Slight decreased.  Decreased sensation to light touch in LUE/LLE  Equal sensation to temperature in bilateral UE  Decreased sensation to temperature in LLE  No sensory level changes to pinprick in back bilaterally  No extinction noted     Gait, Coordination, and Reflexes     Coordination   Finger to nose coordination: normal  Heel-to-shin test: R normal, unable to perform L due to weakness.    Tremor   Resting tremor: absent  Intention tremor: absent    Reflexes   Right brachioradialis: 1+  Left brachioradialis: 1+  Right biceps: 1+  Left biceps: 1+  Right patellar reflex grade: Knee replacement.  Left patellar: 1+  Right achilles: 1+  Left achilles: 1+  Right Artis: absent  Left Artis: absent  Right ankle clonus: absent  Left ankle clonus: absent      NIHSS:  1a.Level of Consciousness: 0 = Alert   1b. LOC Questions: 0 = Answers both correctly   1c. LOC Commands: 0 = Obeys both correctly   2. Best Gaze: 0 = Normal   3. Visual: 0 = No visual field loss   4. Facial Palsy: 0=Normal symmetric movement   5a. Motor Right  Arm: 0=No drift, limb holds 90 (or 45) degrees for full 10 seconds   5b. Motor Left Arm: 0=No drift, limb holds 90 (or 45) degrees for full 10 seconds   6a. Motor Right Le=No drift, limb holds 90 (or 45) degrees for full 10 seconds   6b. Motor Left Le=Some effort against gravity, limb cannot get to or maintain (if cured) 90 (or 45) degrees, drifts down to bed, but has some effort against gravity   7. Limb Ataxia:  0=Absent   8. Sensory: 1=Mild to moderate sensory loss; patient feels pinprick is less sharp or is dull on the affected side; there is a loss of superficial pain with pinprick but patient is aware He is being touched   9. Best Language:  0=No aphasia, normal   10. Dysarthria: 0=Normal articulation   11. Extinction and Inattention (formerly Neglect): 0=No abnormality   Total Score: 3     Time NIHSS was completed: 10:04 am    Modified [place] Score:  Unable to determine currently, will gather additional data      Lab Results: I have reviewed the following results: CBC/BMP:   .     24   WBC 8.25   HGB 13.2   HCT 42.0      SODIUM 143   K 4.0      CO2 30   BUN 45*   CREATININE 2.49*   GLUC 237*    , PTT/INR:  .     24   PTT 36*   INR 1.72*    , Troponin,BNP:  .     24   HSTNI0 67*   BNP 1,532*      Imaging Review: Reviewed radiology reports from this admission including: CT head.  Other Studies: No additional pertinent studies reviewed.    Code Status: Prior  Advance Directive and Living Will:      Power of :    POLST:

## 2024-09-18 NOTE — ASSESSMENT & PLAN NOTE
With history of paroxysmal atrial fibrillation  Home regimen consists of Toprol XL 50 mg daily and Eliquis 5 mg twice daily  Hold Eliquis  Hold Toprol-XL to allow for permissive hypertension in the setting of strokelike symptoms  Continue with tele

## 2024-09-18 NOTE — ASSESSMENT & PLAN NOTE
Lab Results   Component Value Date    HSTNI0 67 (H) 09/18/2024    HSTNI2 66 (H) 09/18/2024    HSTNID2 -1 09/18/2024      Secondary to acute heart failure  Denies any chest pain  Continue with telemetry monitoring  Cardiology following

## 2024-09-18 NOTE — ASSESSMENT & PLAN NOTE
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    Evaluated by neurology, recommend continue with stroke pathway  MRI brain ordered and pending  Echo ordered and pending  Check lipid panel and HbA1c for risk stratification  Continue with aspirin  Hold home Eliquis, loaded with Brilinta, continue with Brilinta 90 twice daily in a.m.  Permissive hypertension  Continue telemetry  PT/OT  Frequent neurochecks

## 2024-09-18 NOTE — PROGRESS NOTES
Gave patient a scale to take home to weigh self. Also given and CHF  plus stroke book for educational material.

## 2024-09-18 NOTE — ASSESSMENT & PLAN NOTE
Lab Results   Component Value Date    EGFR 24 09/18/2024    EGFR 27 (L) 08/17/2022    EGFR 30 11/08/2021    CREATININE 2.49 (H) 09/18/2024    CREATININE 2.45 (H) 08/17/2022    CREATININE 2.16 (H) 11/08/2021     Urinary retention protocol  Monitor BUN/creatinine while on IV diuretics  Hold losartan

## 2024-09-18 NOTE — ASSESSMENT & PLAN NOTE
Not on oxygen at baseline  Now on 2 litres   CXR with pulmonary venous congestion   Expect improvement with IV diuretics  Respiratory protocol

## 2024-09-19 ENCOUNTER — APPOINTMENT (INPATIENT)
Dept: MRI IMAGING | Facility: HOSPITAL | Age: 74
DRG: 064 | End: 2024-09-19
Payer: COMMERCIAL

## 2024-09-19 PROBLEM — I63.9 ACUTE CVA (CEREBROVASCULAR ACCIDENT) (HCC): Status: ACTIVE | Noted: 2024-09-18

## 2024-09-19 LAB
ALBUMIN SERPL BCG-MCNC: 3.6 G/DL (ref 3.5–5)
ALP SERPL-CCNC: 69 U/L (ref 34–104)
ALT SERPL W P-5'-P-CCNC: 14 U/L (ref 7–52)
ANION GAP SERPL CALCULATED.3IONS-SCNC: 9 MMOL/L (ref 4–13)
AST SERPL W P-5'-P-CCNC: 18 U/L (ref 13–39)
BILIRUB SERPL-MCNC: 1.78 MG/DL (ref 0.2–1)
BUN SERPL-MCNC: 40 MG/DL (ref 5–25)
CALCIUM SERPL-MCNC: 9.1 MG/DL (ref 8.4–10.2)
CHLORIDE SERPL-SCNC: 105 MMOL/L (ref 96–108)
CHOLEST SERPL-MCNC: 103 MG/DL
CO2 SERPL-SCNC: 28 MMOL/L (ref 21–32)
CREAT SERPL-MCNC: 2.3 MG/DL (ref 0.6–1.3)
ERYTHROCYTE [DISTWIDTH] IN BLOOD BY AUTOMATED COUNT: 17.4 % (ref 11.6–15.1)
EST. AVERAGE GLUCOSE BLD GHB EST-MCNC: 163 MG/DL
GFR SERPL CREATININE-BSD FRML MDRD: 26 ML/MIN/1.73SQ M
GLUCOSE SERPL-MCNC: 120 MG/DL (ref 65–140)
GLUCOSE SERPL-MCNC: 130 MG/DL (ref 65–140)
GLUCOSE SERPL-MCNC: 149 MG/DL (ref 65–140)
GLUCOSE SERPL-MCNC: 158 MG/DL (ref 65–140)
HBA1C MFR BLD: 7.3 %
HCT VFR BLD AUTO: 40.8 % (ref 36.5–49.3)
HDLC SERPL-MCNC: 35 MG/DL
HGB BLD-MCNC: 13 G/DL (ref 12–17)
LDLC SERPL CALC-MCNC: 56 MG/DL (ref 0–100)
MAGNESIUM SERPL-MCNC: 2.5 MG/DL (ref 1.9–2.7)
MCH RBC QN AUTO: 28 PG (ref 26.8–34.3)
MCHC RBC AUTO-ENTMCNC: 31.9 G/DL (ref 31.4–37.4)
MCV RBC AUTO: 88 FL (ref 82–98)
PLATELET # BLD AUTO: 250 THOUSANDS/UL (ref 149–390)
PMV BLD AUTO: 9.2 FL (ref 8.9–12.7)
POTASSIUM SERPL-SCNC: 3.8 MMOL/L (ref 3.5–5.3)
PROT SERPL-MCNC: 6.8 G/DL (ref 6.4–8.4)
RBC # BLD AUTO: 4.65 MILLION/UL (ref 3.88–5.62)
SODIUM SERPL-SCNC: 142 MMOL/L (ref 135–147)
TRIGL SERPL-MCNC: 62 MG/DL
WBC # BLD AUTO: 9.38 THOUSAND/UL (ref 4.31–10.16)

## 2024-09-19 PROCEDURE — 94760 N-INVAS EAR/PLS OXIMETRY 1: CPT

## 2024-09-19 PROCEDURE — 99233 SBSQ HOSP IP/OBS HIGH 50: CPT | Performed by: STUDENT IN AN ORGANIZED HEALTH CARE EDUCATION/TRAINING PROGRAM

## 2024-09-19 PROCEDURE — 85027 COMPLETE CBC AUTOMATED: CPT | Performed by: INTERNAL MEDICINE

## 2024-09-19 PROCEDURE — 83735 ASSAY OF MAGNESIUM: CPT | Performed by: INTERNAL MEDICINE

## 2024-09-19 PROCEDURE — 82948 REAGENT STRIP/BLOOD GLUCOSE: CPT

## 2024-09-19 PROCEDURE — 99232 SBSQ HOSP IP/OBS MODERATE 35: CPT | Performed by: INTERNAL MEDICINE

## 2024-09-19 PROCEDURE — 80053 COMPREHEN METABOLIC PANEL: CPT | Performed by: INTERNAL MEDICINE

## 2024-09-19 PROCEDURE — 92610 EVALUATE SWALLOWING FUNCTION: CPT

## 2024-09-19 PROCEDURE — 94660 CPAP INITIATION&MGMT: CPT

## 2024-09-19 PROCEDURE — 72148 MRI LUMBAR SPINE W/O DYE: CPT

## 2024-09-19 PROCEDURE — 83036 HEMOGLOBIN GLYCOSYLATED A1C: CPT | Performed by: INTERNAL MEDICINE

## 2024-09-19 PROCEDURE — 70551 MRI BRAIN STEM W/O DYE: CPT

## 2024-09-19 RX ORDER — LOSARTAN POTASSIUM 50 MG/1
100 TABLET ORAL DAILY
Status: DISCONTINUED | OUTPATIENT
Start: 2024-09-19 | End: 2024-09-19

## 2024-09-19 RX ORDER — METOPROLOL SUCCINATE 50 MG/1
50 TABLET, EXTENDED RELEASE ORAL DAILY
Status: DISCONTINUED | OUTPATIENT
Start: 2024-09-19 | End: 2024-09-22 | Stop reason: HOSPADM

## 2024-09-19 RX ORDER — GUAIFENESIN 600 MG/1
600 TABLET, EXTENDED RELEASE ORAL EVERY 12 HOURS SCHEDULED
Status: DISCONTINUED | OUTPATIENT
Start: 2024-09-19 | End: 2024-09-22 | Stop reason: HOSPADM

## 2024-09-19 RX ADMIN — TICAGRELOR 90 MG: 90 TABLET ORAL at 20:07

## 2024-09-19 RX ADMIN — TAMSULOSIN HYDROCHLORIDE 0.8 MG: 0.4 CAPSULE ORAL at 17:53

## 2024-09-19 RX ADMIN — FUROSEMIDE 40 MG: 10 INJECTION, SOLUTION INTRAVENOUS at 09:04

## 2024-09-19 RX ADMIN — FUROSEMIDE 40 MG: 10 INJECTION, SOLUTION INTRAVENOUS at 17:53

## 2024-09-19 RX ADMIN — PANTOPRAZOLE SODIUM 40 MG: 40 TABLET, DELAYED RELEASE ORAL at 08:59

## 2024-09-19 RX ADMIN — DOCUSATE SODIUM 100 MG: 100 CAPSULE, LIQUID FILLED ORAL at 17:53

## 2024-09-19 RX ADMIN — ASPIRIN 81 MG CHEWABLE TABLET 81 MG: 81 TABLET CHEWABLE at 09:00

## 2024-09-19 RX ADMIN — ALLOPURINOL 50 MG: 100 TABLET ORAL at 08:59

## 2024-09-19 RX ADMIN — GUAIFENESIN 600 MG: 600 TABLET ORAL at 20:07

## 2024-09-19 RX ADMIN — METOPROLOL SUCCINATE 50 MG: 50 TABLET, EXTENDED RELEASE ORAL at 17:53

## 2024-09-19 RX ADMIN — ATORVASTATIN CALCIUM 40 MG: 40 TABLET, FILM COATED ORAL at 17:53

## 2024-09-19 RX ADMIN — INSULIN LISPRO 1 UNITS: 100 INJECTION, SOLUTION INTRAVENOUS; SUBCUTANEOUS at 15:04

## 2024-09-19 RX ADMIN — DOCUSATE SODIUM 100 MG: 100 CAPSULE, LIQUID FILLED ORAL at 08:59

## 2024-09-19 RX ADMIN — TICAGRELOR 90 MG: 90 TABLET ORAL at 08:59

## 2024-09-19 RX ADMIN — FLUTICASONE FUROATE AND VILANTEROL TRIFENATATE 1 PUFF: 100; 25 POWDER RESPIRATORY (INHALATION) at 09:05

## 2024-09-19 NOTE — ASSESSMENT & PLAN NOTE
Lab Results   Component Value Date    HGBA1C 7.3 (H) 09/19/2024       Recent Labs     09/18/24  0931 09/18/24  1718 09/19/24  0826 09/19/24  1147   POCGLU 224* 102 120 158*       Blood Sugar Average: Last 72 hrs:  (P) 151    Continue with insulin on sliding scale.    Current sugars within goal

## 2024-09-19 NOTE — ASSESSMENT & PLAN NOTE
Wt Readings from Last 3 Encounters:   09/19/24 101 kg (222 lb 14.2 oz)   09/10/24 108 kg (237 lb 6.4 oz)   10/27/23 119 kg (261 lb 12.8 oz)     Chest x-ray with pulmonary venous congestion   BNP-1532    Home regimen torsemide 40 mg daily, recently decreased to 20 mg daily  Patient reports compliance with regimen    Will continue with IV Lasix 40 twice daily  Daily weights and strict I's and O's  Salt restriction  Echo with EF of 30%, with moderate to severe AS, global hypokinesis  Appreciate cardiology's assistance

## 2024-09-19 NOTE — ASSESSMENT & PLAN NOTE
">>ASSESSMENT AND PLAN FOR ESSENTIAL HYPERTENSION WRITTEN ON 9/19/2024  3:36 PM BY CAMMIE NAILS MD    /92 (BP Location: Right arm)   Pulse 100   Temp 97.7 °F (36.5 °C) (Oral)   Resp 18   Ht 5' 11\" (1.803 m)   Wt 101 kg (222 lb 14.2 oz) Comment: Weighed patient twice. Patient being diuresed with IV lasix, so weight loss is expected  SpO2 94%   BMI 31.09 kg/m²     Hold home regimen of amlodipine, losartan, hydralazine and torsemide  Continue with IV diuretics  Resumed Toprol-XL on 9/19  Vitals as per unit protocol  "

## 2024-09-19 NOTE — PLAN OF CARE
Problem: Neurological Deficit  Goal: Neurological status is stable or improving  Description: Interventions:  - Monitor and assess patient's level of consciousness, motor function, sensory function, and level of assistance needed for ADLs.   - Monitor and report changes from baseline. Collaborate with interdisciplinary team to initiate plan and implement interventions as ordered.   - Provide and maintain a safe environment.  - Consider seizure precautions.  - Consider fall precautions.  - Consider aspiration precautions.  - Consider bleeding precautions.  Outcome: Progressing     Problem: Communication Impairment  Goal: Ability to express needs and understand communication  Description: Assess patient's communication skills and ability to understand information.  Patient will demonstrate use of effective communication techniques, alternative methods of communication and understanding even if not able to speak.     - Encourage communication and provide alternate methods of communication as needed.  - Collaborate with case management/ for discharge needs.  - Include patient/family/caregiver in decisions related to communication.  Outcome: Progressing     Problem: Nutrition  Goal: Nutrition/Hydration status is improving  Description: Monitor and assess patient's nutrition/hydration status for malnutrition (ex- brittle hair, bruises, dry skin, pale skin and conjunctiva, muscle wasting, smooth red tongue, and disorientation). Collaborate with interdisciplinary team and initiate plan and interventions as ordered.  Monitor patient's weight and dietary intake as ordered or per policy. Utilize nutrition screening tool and intervene per policy. Determine patient's food preferences and provide high-protein, high-caloric foods as appropriate.     - Assist patient with eating.  - Allow adequate time for meals.  - Encourage patient to take dietary supplement as ordered.  - Collaborate with clinical  nutritionist.  - Include patient/family/caregiver in decisions related to nutrition.  Outcome: Progressing     Problem: CARDIOVASCULAR - ADULT  Goal: Maintains optimal cardiac output and hemodynamic stability  Description: INTERVENTIONS:  - Monitor I/O, vital signs and rhythm  - Monitor for S/S and trends of decreased cardiac output  - Administer and titrate ordered vasoactive medications to optimize hemodynamic stability  - Assess quality of pulses, skin color and temperature  - Assess for signs of decreased coronary artery perfusion  - Instruct patient to report change in severity of symptoms  Outcome: Progressing  Goal: Absence of cardiac dysrhythmias or at baseline rhythm  Description: INTERVENTIONS:  - Continuous cardiac monitoring, vital signs, obtain 12 lead EKG if ordered  - Administer antiarrhythmic and heart rate control medications as ordered  - Monitor electrolytes and administer replacement therapy as ordered  Outcome: Progressing

## 2024-09-19 NOTE — ASSESSMENT & PLAN NOTE
Does not appear to be in exacerbation  On 2 L of oxygen secondary to volume overload, however on room air now  Continue with home bronchodilator therapy  Respiratory protocol

## 2024-09-19 NOTE — ASSESSMENT & PLAN NOTE
Wt Readings from Last 3 Encounters:   09/19/24 101 kg (222 lb 14.2 oz)   09/10/24 108 kg (237 lb 6.4 oz)   10/27/23 119 kg (261 lb 12.8 oz)       -Cardiology following  -Medical management per primary team and Cardiology

## 2024-09-19 NOTE — UTILIZATION REVIEW
Initial Clinical Review    Admission: Date/Time/Statement:   Admission Orders (From admission, onward)       Ordered        09/18/24 1123  INPATIENT ADMISSION  Once                          Orders Placed This Encounter   Procedures    INPATIENT ADMISSION     Standing Status:   Standing     Number of Occurrences:   1     Order Specific Question:   Level of Care     Answer:   Med Surg [16]     Order Specific Question:   Estimated length of stay     Answer:   More than 2 Midnights     Order Specific Question:   Certification     Answer:   I certify that inpatient services are medically necessary for this patient for a duration of greater than two midnights. See H&P and MD Progress Notes for additional information about the patient's course of treatment.     ED Arrival Information       Expected   -    Arrival   9/18/2024 08:51    Acuity   Urgent              Means of arrival   Walk-In    Escorted by   Family Member    Service   Hospitalist    Admission type   Emergency              Arrival complaint   leg pain             Chief Complaint   Patient presents with    Extremity Weakness     Pt reports left leg weakness since yesterday. He notices pain in his lower back when he lifts left leg. No trauma        Initial Presentation: 73 y.o. male  to ED via walk in from home.    Admitted to inpatient with Dx: stroke like symptoms/atrial fib/acute heart failure/CKD/non ischemic myocardial injury/lumbar radiculopathy.  Presented to ED with left leg weakness noticing about 18 hours prior to arrival after being at [employer].   low back pain .  Last couple of days gained weight, bloating and short of breath and took extra Lasix.  PMHx:HFrEF, paroxysmal atrial fibrillation, hypertension, hyperlipidemia, status post pacemaker placement, NANCY on CPAP, prior history of TIA. On exam: decreased breath sounds.  Lumbar tenderness.   LLE edema.  Sensation decreased on LLE compared to right.  Weak left hip flexion, unable to do heel shin left  heel.   NIHSS 3.  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   D dimer 1.12.  INR 1.72 on Eliquis.  Hs tnl 67.  BNP 1532.  Bun 45. Creatinine 2.49.  glucose 237.   Imaging shows no DVT of LLE.  ED treatment: Brilinta load and IV lasix.  Placed on oxygen 2 liters.    Plan includes:  neuro checks.  MRI brain.  Echo.  Check lipid panel and A1c.  Hold home Eliquis, load with Brilinta and continue.  Permissive hypertension.  Hold home regimen of amlodipine, losartan, hydralazine, Toprol-XL and torsemide.  Start IV diuretics.  Fluid and salt restriction.  Consult Cardiology    Telemetry.  PT/OT.   Continue with home bronchodilator therapy.  Start SSI.      9/18/24 per neurology differential is potential SURINDER territory infarct vs lumbar spine disease. CT head and CTA H/N reviewed with radiology: no acute intracranial abnormality on CT head, CTA H/N with moderate to severe stenosis in the mid-basilar artery, right vertebral artery and left vertebral artery intracranially, no IR target. 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  Hold eliquis and check MRI brain.  Continue home asa and add Brilinta.  Permissive hypertension to 180 systolic.  Neuro checks.     9/18/24 per Cardiology - acute on chronic CHF with preserved EF/Permanent atrial fib/history of permanent pacer in 2020 for SSS and upgraded to BiV in 2022 after pacer induced cardiomyopathy and ED of 40%. Start IV lasix.  Check echo.   On Eliquis  Neurology working  up for left lower weakness.      Anticipated Length of Stay/Certification Statement:  Patient will be admitted on an inpatient basis with an anticipated length of stay of greater than 2 midnights secondary to strokelike symptoms, acute CHF.     Date:  9/19   Day 2:  feels steadier when uses walker.   LLE still weak but better than admission.  Overnight responded to IV lasix with -2.5L out.  Still with fluid overload.   On exam:  alert and oriented.  BLE edema.  Lungs crackles.     Finger-Nose-Finger Test normal. Heel-to-shin test: R normal, attempts to perform L but unable to due to weakness. Full strength throughout bilateral UE/LE except for:  L hip flexion 4/5.  Continue IV Diuresis.  neuro checks.   Continue aspirin 81 mg and Brilinta 90 mg BID.  Normotension.  Telemetry.   MRI brain this afternoon    ED Triage Vitals   Temperature Pulse Respirations Blood Pressure SpO2 Pain Score   09/18/24 1015 09/18/24 0858 09/18/24 0857 09/18/24 0858 09/18/24 0858 09/18/24 1515   98.4 °F (36.9 °C) 75 18 137/91 91 % No Pain     Weight (last 2 days)       Date/Time Weight    09/19/24 0422 101 (222.89)     Weight: Weighed patient twice. Patient being diuresed with IV lasix, so weight loss is expected at 09/19/24 0422    09/18/24 1513 105 (231.48)    09/18/24 1319 105 (230.4)    09/18/24 0926 105 (231)            Vital Signs (last 3 days)       Date/Time Temp Pulse Resp BP MAP (mmHg) SpO2 Calculated FIO2 (%) - Nasal Cannula O2 Flow Rate (L/min) Nasal Cannula O2 Flow Rate (L/min) O2 Device O2 Interface Device Patient Position - Orthostatic VS Umer Coma Scale Score Pain    09/19/24 1403 -- 100 -- -- -- 95 % 28 -- 2 L/min Nasal cannula -- -- -- --    09/19/24 1342 -- 100 -- -- -- 96 % 28 -- 2 L/min Nasal cannula -- -- -- --    09/19/24 1328 -- 100 18 -- -- 95 % 28 -- 2 L/min Nasal cannula -- -- -- --    09/19/24 1116 -- -- -- -- -- -- -- -- -- -- -- -- -- No Pain    09/19/24 09:02:32 97.7 °F (36.5 °C) 76 20 140/92 108 96 % -- --  -- None (Room air) -- Sitting -- --    09/19/24 05:59:39 -- 69 -- 132/91 105 98 % -- -- -- -- -- -- 15 --    09/19/24 0254 -- -- -- -- -- -- -- 3 L/min -- CPAP Face mask -- -- --    09/19/24 02:05:01 97.8 °F (36.6 °C) 69 16 124/88 100 99 % -- -- -- CPAP -- Lying 15 --    09/19/24 00:01:33 -- 66 -- 129/80 96 98 % -- -- -- -- -- -- 15 --    09/18/24 2228 -- -- -- -- -- -- -- 3 L/min -- CPAP Face mask -- -- --    09/18/24 22:02:09 98 °F (36.7 °C) 79 -- 138/90 106 92 % -- -- -- -- -- -- 15 --    09/18/24 19:55:53 97.8 °F (36.6 °C) 83 18 136/95 109 93 % -- -- -- None (Room air) -- Sitting 15 No Pain    09/18/24 18:17:02 -- 81 18 163/103 123 93 % -- -- -- None (Room air) -- Sitting -- --    09/18/24 1749 -- 78 20 -- -- 92 % -- -- -- None (Room air) -- -- -- --    09/18/24 16:50:29 98.2 °F (36.8 °C) 81 20 146/110 122 98 % -- -- -- -- -- -- -- --    09/18/24 1515 -- -- -- -- -- -- -- -- -- -- -- -- -- No Pain    09/18/24 1513 -- 76 -- 148/92 -- 98 % -- -- -- -- -- -- -- --    09/18/24 12:10:29 -- 81 20 148/92 111 98 % -- -- -- -- -- -- -- --    09/18/24 1045 -- 69 20 143/97 116 93 % -- -- -- -- -- -- 15 --    09/18/24 1030 -- 71 20 131/97 -- 95 % -- -- -- -- -- -- 15 --    09/18/24 1015 98.4 °F (36.9 °C) 69 20 138/97 -- 94 % -- -- -- -- -- -- 15 --    09/18/24 1000 -- 71 22 143/98 -- 93 % -- -- -- -- -- -- 15 --    09/18/24 0954 -- 69 18 129/92 -- 95 % -- -- -- Nasal cannula -- -- 15 --    09/18/24 0939 -- 78 18 139/88 -- 91 % -- -- -- None (Room air) -- -- 15 --    09/18/24 0858 -- 75 -- 137/91 -- 91 % -- -- -- None (Room air) -- Lying -- --    09/18/24 0857 -- -- 18 -- -- -- -- -- -- -- -- -- -- --             09/17 0701 09/18 0700 09/18 0701 09/19 0700 09/19 0701 09/20 0700   P.O.  1840    Total Intake(mL/kg)  1840 (18.2)    Urine (mL/kg/hr)  4527 1200 (1.6)   Stool  0    Total Output  4527 1200   Net  -2687 -1200         Unmeasured Stool Occurrence  1 x      Pertinent Labs/Diagnostic Test Results:   Radiology:  VAS  VENOUS DUPLEX -LOWER LIMB UNILATERAL   Final Interpretation by Boom Fallon MD (09/18 1617)   RIGHT LOWER LIMB LIMITED:  Evaluation shows no evidence of thrombus in the common femoral vein.  Doppler evaluation shows a normal response to augmentation maneuvers.     LEFT LOWER LIMB:  No evidence of acute or chronic deep vein thrombosis  No evidence of superficial thrombophlebitis noted.  Doppler evaluation shows a normal response to augmentation maneuvers.  Popliteal, posterior tibial and anterior tibial arterial Doppler waveform's are  triphasic.   CT spine lumbar without contrast   Final Interpretation by Lasha Valadez DO (09/18 1013)      Diffuse lumbar spondylitic degenerative change with annular bulging, osteophyte formation and annular calcifications. Multilevel moderate canal stenosis and foraminal narrowing from L2-3 through L5-S1.      At L5-S1 foraminal narrowing is more severe. At L3-4 and L4-5 there is moderate bilateral foraminal narrowing including mass effect upon the exiting nerves, worst at the L3-4 level.      Workstation performed: [id number]         CT stroke alert brain   Final Interpretation by Lasha Valadez DO (09/18 1008)      Stable chronic microangiopathic change with no mass, hemorrhage or definitive signs of acute territorial infarction.      Findings were directly discussed with Steven Mckeon at approximately 10:00 a.m.      Workstation performed: [id number]         CTA stroke alert (head/neck)   Final Interpretation by Lasha Valadez DO (09/18 1006)      Proximal occlusion of the left vertebral artery with diffuse hypoplasia. The V4 segment on the left also demonstrates either high-grade stenosis or short segment occlusion of the hypoplastic intracranial portion of the vessel.      The right vertebral artery demonstrates a moderate to high-grade origin stenosis and moderate stenosis intracranially.      There is a high-grade stenosis of the mid  basilar artery.         Findings were directly discussed with Steven Mckeon at approximately 10:00 a.m.      Workstation performed: [id number]         XR chest portable   Final Interpretation by Jose Miguel Fernandez MD (09/18 0922)      Cardiomegaly with pulmonary vascular congestion.      Questionable small area of left lung infiltrate and left pleural effusion. Recommend continued follow-up.                  Workstation performed: [id number]         MRI lumbar spine wo contrast    (Results Pending)   MRI brain wo contrast    (Results Pending)     Cardiology:  Echo complete w/ contrast if indicated   Final Result by Angel Alas MD (09/18 5533)        Left Ventricle: Left ventricular cavity size is moderately dilated.    Wall thickness is mildly increased. The left ventricular ejection fraction    is 30%. Systolic function is severely reduced. There is severe global    hypokinesis with regional variation. Abnormal diastolic inflow patterns    due to atrial fibrillation. Left atrial filling pressure is elevated.     Right Ventricle: Right ventricular cavity size is dilated. Systolic    function is moderately reduced.     Left Atrium: The atrium is severely dilated.     Right Atrium: The atrium is severely dilated.     Aortic Valve: The aortic valve is trileaflet. The leaflets are severely    calcified. There is severely reduced mobility. There appears to be    moderate to severe stenosis, especially visually. The aortic valve mean    gradient is 12.0 mmHg. The aortic valve velocity is increased due to    stenosis but lower than expected due to the presence of decreased flow.     Aortic valve area could be underestimated.     Mitral Valve: There is mild annular calcification. There is moderate to    severe regurgitation.     Tricuspid Valve: There is moderate regurgitation.     Aorta: The aortic root is normal in size. The ascending aorta is mildly    dilated.     Pericardium: There is a small pericardial  effusion posterior to the    heart. There is no echocardiographic evidence of tamponade.     Pulmonary Artery: The pulmonary artery systolic pressure is severely    increased.           Date/Time: 9/18/2024 12:35 PM  Indications / Diagnosis:  Sob  ECG reviewed by me, the ED Provider: yes    Patient location:  ED  Previous ECG:     Previous ECG:  Compared to current    Comparison ECG info:  11-21    Similarity:  No change  Interpretation:     Interpretation: abnormal    Rate:     ECG rate:  70    ECG rate assessment: normal    Rhythm:     Rhythm: paced    Pacing:     Capture:  Complete    Type of pacing:  Ventricular  Ectopy:     Ectopy: none    ST segments:     ST segments:  Normal  T waves:     T waves: normal         Results from last 7 days   Lab Units 09/19/24  0402 09/18/24  0925   WBC Thousand/uL 9.38 8.25   HEMOGLOBIN g/dL 13.0 13.2   HEMATOCRIT % 40.8 42.0   PLATELETS Thousands/uL 250 229     Results from last 7 days   Lab Units 09/19/24  0402 09/18/24  0925   SODIUM mmol/L 142 143   POTASSIUM mmol/L 3.8 4.0   CHLORIDE mmol/L 105 104   CO2 mmol/L 28 30   ANION GAP mmol/L 9 9   BUN mg/dL 40* 45*   CREATININE mg/dL 2.30* 2.49*   EGFR ml/min/1.73sq m 26 24   CALCIUM mg/dL 9.1 9.2   MAGNESIUM mg/dL 2.5  --      Results from last 7 days   Lab Units 09/19/24  0402 09/18/24  0925   AST U/L 18 20   ALT U/L 14 16   ALK PHOS U/L 69 72   TOTAL PROTEIN g/dL 6.8 7.2   ALBUMIN g/dL 3.6 3.7   TOTAL BILIRUBIN mg/dL 1.78* 1.21*     Results from last 7 days   Lab Units 09/19/24  1147 09/19/24  0826 09/18/24  1718 09/18/24  0931   POC GLUCOSE mg/dl 158* 120 102 224*     Results from last 7 days   Lab Units 09/19/24  0402 09/18/24  0925   GLUCOSE RANDOM mg/dL 130 237*     Results from last 7 days   Lab Units 09/19/24  0402 09/18/24  0925   HEMOGLOBIN A1C % 7.3* 7.4*   EAG mg/dl 163 166     Results from last 7 days   Lab Units 09/18/24  1144 09/18/24  0925   HS TNI 0HR ng/L  --  67*   HS TNI 2HR ng/L 66*  --    HSTNI D2 ng/L -1   --      Results from last 7 days   Lab Units 09/18/24  0925   D-DIMER QUANTITATIVE ug/ml FEU 1.12*     Results from last 7 days   Lab Units 09/18/24  0925   PROTIME seconds 20.6*   INR  1.72*   PTT seconds 36*     Results from last 7 days   Lab Units 09/18/24  0925   TSH 3RD GENERATON uIU/mL 0.736     Results from last 7 days   Lab Units 09/18/24  0925   BNP pg/mL 1,532*     ED Treatment-Medication Administration from 09/18/2024 0851 to 09/18/2024 1200         Date/Time Order Dose Route Action     09/18/2024 1105 ticagrelor (BRILINTA) tablet 180 mg 180 mg Oral Given     09/18/2024 1106 furosemide (LASIX) injection 20 mg 20 mg Intravenous Given            Past Medical History:   Diagnosis Date    A-fib ([facility])     Arthritis     CHF (congestive heart failure) ([facility])     Chronic kidney disease don't remember    COPD (chronic obstructive pulmonary disease) ([facility])     Diabetes mellitus ([facility])     Enlarged prostate     Gout     Heart murmur birth    Hyperlipidemia     Hypertension      Present on Admission:   Chronic obstructive pulmonary disease (COPD) ([facility])   Type 2 diabetes mellitus, without long-term current use of insulin ([facility])   Essential hypertension   CKD (chronic kidney disease) stage 4, GFR 15-29 ml/min ([facility])   Obstructive sleep apnea   Hypoxia   Pacemaker   Acute HFrEF (heart failure with reduced ejection fraction) ([facility])   Non-ischemic myocardial injury (non-traumatic)   Lumbar radiculopathy      Admitting Diagnosis: Left leg weakness [R29.898]  Lumbar herniated disc [M51.26]  CHF, acute ([facility]) [I50.9]  Weakness of extremity [R29.898]  Age/Sex: 73 y.o. male  Admission Orders:  Scheduled Medications:  allopurinol, 50 mg, Oral, Every Other Day  aspirin, 81 mg, Oral, Daily  atorvastatin, 40 mg, Oral, QPM  docusate sodium, 100 mg, Oral, BID  Fluticasone Furoate-Vilanterol, 1 puff, Inhalation, Daily  furosemide, 40 mg, Intravenous, BID (diuretic)  insulin lispro, 1-5 Units, Subcutaneous, TID AC  pantoprazole, 40 mg, Oral,  Daily  tamsulosin, 0.8 mg, Oral, Daily With Dinner  ticagrelor, 90 mg, Oral, Q12H SUSAN      Continuous IV Infusions:     PRN Meds:  acetaminophen, 650 mg, Oral, Q6H PRN  albuterol, 2 puff, Inhalation, Q6H PRN  sodium chloride, 2 spray, Each Nare, Q1H PRN    Telemetry   Neuro checks -  Every 1 hour x 4 hours, then every 2 hours x 8 hours, then every 4 hours x 72 hours   Fluid restriction  Cpap at bedtime   PT/OT/Speech    IP CONSULT TO NEUROLOGY  IP CONSULT TO CARDIOLOGY    Network Utilization Review Department  ATTENTION: Please call with any questions or concerns to [phone] and carefully listen to the prompts so that you are directed to the right person. All voicemails are confidential.   For Discharge needs, contact Care Management DC Support Team at [phone] opt. [phone]  Send all requests for admission clinical reviews, approved or denied determinations and any other requests to dedicated fax number below belonging to the campus where the patient is receiving treatment. List of dedicated fax numbers for the Facilities:  FACILITY NAME UR FAX NUMBER   ADMISSION DENIALS (Administrative/Medical Necessity) [phone]   DISCHARGE SUPPORT TEAM (NETWORK) [phone]   PARENT CHILD HEALTH (Maternity/NICU/Pediatrics) [phone]   [facility] [phone]   [facility] [phone]   [facility] [phone]   [facility] [phone]   [facility] [phone]   [facility] [phone]   [facility] [phone]   [facility] [phone]   [facility] [phone]   [facility] [phone]   General acute hospital [phone]   [facility] [phone]

## 2024-09-19 NOTE — ASSESSMENT & PLAN NOTE
>>ASSESSMENT AND PLAN FOR ACUTE CVA (CEREBROVASCULAR ACCIDENT) (HCC) WRITTEN ON 9/19/2024 10:37 AM BY AN ROBER MAC    72 y/o male with Afib on Eliquis, arthritis, CHF, CKD, COPD, DM, HTN, HLD, recent COVID infection, pacemaker, NANCY on CPAP, who presented on 9/18 with LLE weakness since 9/17. Stroke alert was initiated. BP on presentation 137/91. NIHSS 3- LLE weakness and L sensory deficit. Patient was deemed not a TNK candidate due to being outside of the appropriate time window/bleeding risk on Eliquis. Unclear etiology at this time. Workup as noted below.    Workup:  -CT head: Stable chronic microangiopathic change with no mass, hemorrhage or definitive signs of acute territorial infarction.   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 The V4 segment on the left also demonstrates either high-grade stenosis or short segment occlusion of the hypoplastic intracranial portion of the vessel.  The right vertebral artery demonstrates a moderate to high-grade origin stenosis and moderate stenosis intracranially.  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  -CT lumbar spine:  Diffuse lumbar spondylitic degenerative change with annular bulging, osteophyte formation and annular calcifications. Multilevel moderate canal stenosis and foraminal narrowing from L2-3 through L5-S1.  At L5-S1 foraminal narrowing is more severe. At L3-4 and L4-5 there is moderate bilateral foraminal narrowing including mass effect upon the exiting nerves, worst at the L3-4 level.  -Echo: EF 35%. Severe global hypokinesis. Bilateral atrium severely dilated.  -Labs: troponin 67, creatinine 2.49, d-dimer 1.12, BNP 1532, LDL 56    Plan:  - Stroke pathway  MRI brain  Hemoglobin A1c  Continue aspirin 81 mg and Brilinta 90 mg BID  Hold Eliquis for now  Pending MRI, will discuss further when able to reinitiate  Atorvastatin 40 mg  Goal normotension, avoid hypotension  Continue  telemetry  PT/OT/ST  Frequent neuro checks. Continue to monitor and notify neurology with any changes.   - MRI lumbar spine  - Medical management and supportive care per primary team. Correction of any metabolic or infectious disturbances.

## 2024-09-19 NOTE — SPEECH THERAPY NOTE
Speech Language/Pathology  Speech-Language Pathology Bedside Swallow Evaluation      Patient Name: Dangelo Cooper    Today's Date: 9/19/2024     Problem List  Principal Problem:    Stroke-like symptoms  Active Problems:    Chronic obstructive pulmonary disease (COPD) ([facility])    Type 2 diabetes mellitus, without long-term current use of insulin ([facility])    Essential hypertension    Acute HFrEF (heart failure with reduced ejection fraction) ([facility])    CKD (chronic kidney disease) stage 4, GFR 15-29 ml/min ([facility])    Obstructive sleep apnea    Hypoxia    Pacemaker    A-fib ([facility])    Non-ischemic myocardial injury (non-traumatic)    Lumbar radiculopathy      Past Medical History  Past Medical History:   Diagnosis Date    A-fib ([facility])     Arthritis     CHF (congestive heart failure) ([facility])     Chronic kidney disease don't remember    COPD (chronic obstructive pulmonary disease) ([facility])     Diabetes mellitus ([facility])     Enlarged prostate     Gout     Heart murmur birth    Hyperlipidemia     Hypertension        Past Surgical History  Past Surgical History:   Procedure Laterality Date    A-V CARDIAC PACEMAKER INSERTION  08/2022    EAR SURGERY      EYE SURGERY  2021    cat. repaired    FRACTURE SURGERY         Summary   Pt presents w/ suspected functional oropharyngeal swallow skills.     Complete labial seal for retrieval and containment of all materials, no anterior bolus loss present. Timely rotary mastication of regular textures w/ complete bolus breakdown and timely bolus transfer. No oral residue noted. Suspected fairly prompt swallow initiation and fair hyolaryngeal elevation to palpation. No overt s/s of aspiration.     Education provided to pt regarding strategies to optimize swallow safety including frequent/thorough oral care and to notify medical staff if s/s of aspiration arise. Pt verbalized understanding and agreement, denies questions or concerns at this time.     Pt w/ increased risk of aspiration 2/2 current admission  c/f CVA and multiple medical co-morbidities. SLP to monitor MRI results, if MRI neg (-) SLP to s/o as no skilled intervention required, if MRI pos (+) SLP to f/u x1 for diet tolerance.       Recommended Diet: regular diet and thin liquids   Recommended Form of Meds: as tolerated    Aspiration precautions and swallowing strategies: upright posture  Other Recommendations: Continue frequent oral care        Current Medical Status  Pt is a 73 y.o. male with a PMH of HFrEF, paroxysmal atrial fibrillation, hypertension, hyperlipidemia, status post pacemaker placement, NANCY on CPAP, prior history of TIA presented with left lower extremity weakness/numbness.      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 The V4 segment on the left also demonstrates either high-grade stenosis or short segment occlusion of the hypoplastic intracranial portion of the vessel.     The right vertebral artery demonstrates a moderate to high-grade origin stenosis and moderate stenosis intracranially.     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    9/18/24 CT stroke alert brain:  Stable chronic microangiopathic change with no mass, hemorrhage or definitive signs of acute territorial infarction.     9/18/24 CT spine lumbar without contrast:  Diffuse lumbar spondylitic degenerative change with annular bulging, osteophyte formation and annular calcifications. Multilevel moderate canal stenosis and foraminal narrowing from L2-3 through L5-S1.     At L5-S1 foraminal narrowing is more severe. At L3-4 and L4-5 there is moderate bilateral foraminal narrowing including mass effect upon the exiting nerves, worst at the L3-4 level.    9/18/24 XR chest portable:  Cardiomegaly with pulmonary vascular congestion.     Questionable small area of left lung infiltrate and  left pleural effusion. Recommend continued follow-up    History of VFSS:  N/A     Social/Education/Vocational Hx:  Pt lives w/ spouse     Swallow Information   Current Diet: regular diet and thin liquids   Baseline Diet: regular diet and thin liquids per pt report       Baseline Assessment   Behavior/Cognition: alert  Speech/Language Status: able to participate in conversation and able to follow commands  Patient Positioning: upright in bed  Pain Status/Interventions/Response to Interventions: No report of or nonverbal indications of pain.       Oral Mechanism Exam  Facial: symmetrical  Labial: WFL  Lingual: WFL  Velum: symmetrical  Mandible: adequate ROM  Dentition: full upper denture   Vocal quality:clear/adequate   Volitional Cough: strong/productive   Respiratory Status: on RA   Additional: wears glasses       Consistencies Assessed and Performance   Consistencies Administered: thin liquids via cup and straw sip and hard solids    Oral Stage:   Complete labial seal for retrieval and containment of all materials, no anterior bolus loss present. Timely rotary mastication of regular textures w/ complete bolus breakdown and timely bolus transfer. No oral residue noted.     Pharyngeal Stage:   Suspected fairly prompt swallow initiation and fair hyolaryngeal elevation to palpation. No overt s/s of aspiration.     Esophageal Concerns: none reported    Summary and Recommendations (see above)    Results Reviewed with: patient, RN, and MD     Treatment Recommended: will monitor MRI results, if MRI neg (-) SLP to s/o as no skilled intervention required, if MRI pos (+) SLP to f/u for diet tolerance x1

## 2024-09-19 NOTE — ASSESSMENT & PLAN NOTE
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    Evaluated by neurology, recommend continue with stroke pathway  MRI brain : Recent infarcts in the white matter of right frontal parietal junction and left frontal cortex and adjacent subcortical white matter.  Bilateral involvement suggest embolic phenomena.  Possible subacute infarct of left occipital subcortical region  Echo-EF 30%, severe global hypokinesis, moderate to severe AAS  Lipid panel and HbA1c reviewed  Continue with aspirin  Hold home Eliquis, loaded with Brilinta, continue with Brilinta 90 twice daily   Normotension/euglycemia  Continue telemetry  PT/OT  Frequent neurochecks

## 2024-09-19 NOTE — OCCUPATIONAL THERAPY NOTE
Occupational Therapy Cx Note     Patient Name: Dangelo Cooper  Today's Date: 9/19/2024  Problem List  Principal Problem:    Stroke-like symptoms  Active Problems:    Chronic obstructive pulmonary disease (COPD) ([facility])    Type 2 diabetes mellitus, without long-term current use of insulin ([facility])    Essential hypertension    Acute HFrEF (heart failure with reduced ejection fraction) ([facility])    CKD (chronic kidney disease) stage 4, GFR 15-29 ml/min ([facility])    Obstructive sleep apnea    Hypoxia    Pacemaker    A-fib ([facility])    Non-ischemic myocardial injury (non-traumatic)    Lumbar radiculopathy            09/19/24 0845   OT Last Visit   OT Visit Date 09/19/24   Note Type   Note type Cancelled Session   Additional Comments OT orders received, chart reviewed. Pt presenting on stroke pathway. Pt currently pending MRI of brain & lumbar spine. Will hold & f/u once MRI lumbar spine completed/read & necessary POC established if warranted based off results.       Mylene Young, OTR/L

## 2024-09-19 NOTE — NURSING NOTE
Inpatient arrived for MRI study of brain, spine.  Cardiology order for pacemaker programming change to MRI sure scan signed by ADAIR Yi PA-C cardiology.                                   Baseline MDT Pacer Mode  VVIR Low rate 70 Upper track 120.MRI sure scan turned on, settings for scan changed to  via [employer] Smart Sync I pad application.   Patient continuously monitored during MRI scan by RN. See vitals flowsheet. Patient c/o shortness of breath when lying on mri table, 02 applied 2lnc for scan.     Post MRI interrogation completed. Pacer set back to baseline settings VVIR 70/120 using the same application. Device status, scan parameters and order to be scanned into patient chart.

## 2024-09-19 NOTE — CASE MANAGEMENT
Case Management Assessment & Discharge Planning Note    Patient name Dangelo Cooper  Location /-01 MRN [id number]  : 1950 Date 2024       Current Admission Date: 2024  Current Admission Diagnosis:Stroke-like symptoms   Patient Active Problem List    Diagnosis Date Noted Date Diagnosed    Stroke-like symptoms 2024     A-fib ([facility]) 2024     Non-ischemic myocardial injury (non-traumatic) 2024     Lumbar radiculopathy 2024     Sore throat 09/10/2024     COVID-19 2024     Polyneuropathy associated with underlying disease ([facility]) 2022     Stage 4 chronic kidney disease due to type 2 diabetes mellitus ([facility]) 2022     Ascending aorta dilatation ([facility]) 2022     Obesity, morbid ([facility]) 2022     Secondary hyperparathyroidism ([facility]) 2022     Pacemaker 2021     BMI 35.0-35.9,adult 2021     Chest pain 10/31/2021     Hypoxia 10/31/2021     Essential hypertension 2021     Acute HFrEF (heart failure with reduced ejection fraction) ([facility]) 2021     CKD (chronic kidney disease) stage 4, GFR 15-29 ml/min ([facility]) 2021     Obstructive sleep apnea 2021     NSTEMI (non-ST elevated myocardial infarction) ([facility]) 2017     Chronic obstructive pulmonary disease (COPD) ([facility]) 2017     Right sided weakness 2017     Hypertensive emergency 2017     Paroxysmal atrial fibrillation ([facility]) 2017     Type 2 diabetes mellitus, without long-term current use of insulin ([facility]) 2017       LOS (days): 1  Geometric Mean LOS (GMLOS) (days): 3.9  Days to GMLOS:2.8     OBJECTIVE:    Risk of Unplanned Readmission Score: 15.01         Current admission status: Inpatient       Preferred Pharmacy:   [facility] [place], [place] - [address] [address]  Phone: [phone] Fax: [phone]    Primary Care Provider: Ada Benites DO    Primary Insurance:  [employer] REP  Secondary Insurance:     ASSESSMENT:  Active Health Care Proxies       Dottie Cooper Health Care Representative - Spouse   Primary Phone: [phone] (Mobile)  Home Phone: [phone]                 Advance Directives  Does patient have a Health Care POA?: Yes  Does patient have Advance Directives?: Yes  Advance Directives: Power of  for health care  Primary Contact: Dottie Cooper, Spouse.         Readmission Root Cause  30 Day Readmission: No    Patient Information  Admitted from:: Home  Mental Status: Alert  During Assessment patient was accompanied by: Not accompanied during assessment  Assessment information provided by:: Patient  Primary Caregiver: Self  Support Systems: Spouse/significant other, Self  County of Residence: [place]  What city do you live in?: [place]  Home entry access options. Select all that apply.: Stairs  Number of steps to enter home.: 2  Do the steps have railings?: Yes  Type of Current Residence: Other (Comment), [facility]  Living Arrangements: Lives w/ Spouse/significant other  Is patient a ?: Yes  Is patient active with [facility] ([facility] [employer])?: Yes  Is patient service connected?: Yes    Activities of Daily Living Prior to Admission  Functional Status: Independent  Completes ADLs independently?: Yes  Ambulates independently?: Yes  Does patient use assisted devices?: Yes  Assisted Devices (DME) used: Straight Cane, Walker, CPAP  DME Company Name (respiratory supplies): [employer], per patient.  Does patient currently own DME?: Yes  What DME does the patient currently own?: Straight Cane, Walker, CPAP  Does patient have a history of Outpatient Therapy (PT/OT)?: Yes  Does the patient have a history of Short-Term Rehab?: No  Does patient have a history of HHC?: No  Does patient currently have HHC?: No         Patient Information Continued  Income Source: SSI/SSD  Does patient have prescription coverage?: Yes  Does patient receive  dialysis treatments?: No  Does patient have a history of substance abuse?: No  Does patient have a history of Mental Health Diagnosis?: No         Means of Transportation  Means of Transport to Appts:: Drives Self      Social Determinants of Health (SDOH)      Flowsheet Row Most Recent Value   Housing Stability    In the last 12 months, was there a time when you were not able to pay the mortgage or rent on time? N   In the past 12 months, how many times have you moved where you were living? 1   At any time in the past 12 months, were you homeless or living in a shelter (including now)? N   Transportation Needs    In the past 12 months, has lack of transportation kept you from medical appointments or from getting medications? no   In the past 12 months, has lack of transportation kept you from meetings, work, or from getting things needed for daily living? No   Food Insecurity    Within the past 12 months, you worried that your food would run out before you got the money to buy more. Never true   Within the past 12 months, the food you bought just didn't last and you didn't have money to get more. Never true   Utilities    In the past 12 months has the electric, gas, oil, or water company threatened to shut off services in your home? No            DISCHARGE DETAILS:    Discharge planning discussed with:: Pt  Freedom of Choice: Yes     CM contacted family/caregiver?: No- see comments (Pt is in contact with family.)  Were Treatment Team discharge recommendations reviewed with patient/caregiver?: Yes  Did patient/caregiver verbalize understanding of patient care needs?: Yes  Were patient/caregiver advised of the risks associated with not following Treatment Team discharge recommendations?: Yes         Requested Home Health Care         Is the patient interested in HHC at discharge?: No    DME Referral Provided  Referral made for DME?: No                                                           Additional Comments: [facility]  met with patient at bedside to discern discharge needs. Pt lives with spouse in a double wide modular home. Pt recently moved to the home 3 weeks ago. 2STE, 1st fl setup. Reports being indpendent with walking and ADLs PTA. Retired and drives. Uses a CPAP. Owns a cane and walker. Hx of OPPT. No hx of STR and HHC. Daughter is a nurse. Pt is a service connected , per patient, he is '90% disability with the [facility].' Reports no SDOH, mental health, and D&A concerns. Spouse Dottie is the medical POA. Family to transport.

## 2024-09-19 NOTE — PROGRESS NOTES
Cardiology Progress Note   Dangelo Cooper 73 y.o. male MRN: [id number]    Unit/Bed#: -01 Encounter: [id number]      ASSESSMENT:  Acute on chronic HFpEF  BNP 1532  CXR: pulm venous congestion  Home diuretic: torsemide 40mg QD (ran out of Rx and placed on 20mg for the past 1 week)  Permanent atrial fibrillation s/p ablation x2 and CV x2  11-beat run of NSVT vs aberrancy noted on telemetry, pacer rhythm  SSS s/p SC MDT PPM (2020)  Hx of Pacer induced cardiomyopathy (reported as low as 40% in 2022) s/p BiV upgrade (8/26/2022)  Moderate-severe MR -- conservatively managed  Secundum ASD  Hypertension  Hyperlipidemia  JOSH on CKD3 -- likely cardiorenal from #1     TTE 9/18/2024: EF 30% severely reduced LV systolic function with severe global hypokinesis with regional variation, RV dilated with moderately reduced function, severe biatrial dilation, moderate to severe AAS, moderate to severe MR, moderate TR, small pericardial effusion posterior to the heart without evidence of tamponade, severely increased PASP.    Plan:   Dangelo responded very well to IV Lasix with -2.5 L overnight. BMP shows improved renal function. Would continue with this dose as he still appears hypervolemic with lower extremity edema and crackles on exam.  Permissive hypertension as per neurology and is off all antihypertensives except for diuretics as noted above. Resume when able. Already on GDMT with Toprol-XL, Losartan. We will look to further optimize therapy by adding Aldactone when able.   He is currently on DAPT therapy with ASA and Brilinta as per neurology for significant cerebrovascular disease noted on imaging. Will look to resume Eliquis when feasible which is on hold due to concerns regarding hemorrhagic conversion. MRI brain pending this afternoon. Ideally, should be on single AP + DOAC vs triple therapy d/t increased bleed risk.     Subjective:   Patient seen and examined. Overnight events reviewed. Patient denies any acute  "complaints at this time.     Objective:     Vitals: Blood pressure 140/92, pulse 76, temperature 97.7 °F (36.5 °C), temperature source Oral, resp. rate 20, height 5' 11\" (1.803 m), weight 101 kg (222 lb 14.2 oz), SpO2 96%., Body mass index is 31.09 kg/m².,   Orthostatic Blood Pressures      Flowsheet Row Most Recent Value   Blood Pressure 140/92 filed at 09/19/2024 0902   Patient Position - Orthostatic VS Sitting filed at 09/19/2024 0902              Intake/Output Summary (Last 24 hours) at 9/19/2024 1310  Last data filed at 9/19/2024 1116  Gross per 24 hour   Intake 1840 ml   Output 5552 ml   Net -3712 ml         Physical Exam:    GEN: Dangelo Cooper appears well, alert and oriented x 3, pleasant and cooperative   HEENT: Sclera anicteric, conjunctivae pink, mucous membranes moist. Oropharynx clear.   NECK: supple, no significant JVD, Trachea midline, no thyromegaly.   HEART: regular rhythm, normal S1 and S2, no murmurs, clicks, gallops or rubs   LUNGS: clear to auscultation bilaterally; no wheezes, rales, or rhonchi. No increased work of breathing or signs of respiratory distress.   ABDOMEN: Soft, nontender, nondistended  EXTREMITIES: Skin warm and well perfused, no clubbing, cyanosis, or edema.  NEURO: No focal findings. Normal speech. Mood and affect normal.   SKIN: Normal without suspicious lesions on exposed skin.      Medications:      Current Facility-Administered Medications:     acetaminophen (TYLENOL) tablet 650 mg, 650 mg, Oral, Q6H PRN, Linda Moran MD    albuterol (PROVENTIL HFA,VENTOLIN HFA) inhaler 2 puff, 2 puff, Inhalation, Q6H PRN, Linda Moran MD    allopurinol (ZYLOPRIM) tablet 50 mg, 50 mg, Oral, Every Other Day, Linda Moran MD, 50 mg at 09/19/24 0859    aspirin chewable tablet 81 mg, 81 mg, Oral, Daily, Linda Moran MD, 81 mg at 09/19/24 0900    atorvastatin (LIPITOR) tablet 40 mg, 40 mg, Oral, QPM, Linda Moran MD, 40 mg at 09/18/24 1704    docusate sodium (COLACE) capsule 100 mg, 100 mg, Oral, " BID, Linda Moran MD, 100 mg at 09/19/24 0859    Fluticasone Furoate-Vilanterol 100-25 mcg/actuation 1 puff, 1 puff, Inhalation, Daily, Linda Moran MD, 1 puff at 09/19/24 0905    furosemide (LASIX) injection 40 mg, 40 mg, Intravenous, BID (diuretic), Tay Gaston PA-C, 40 mg at 09/19/24 0904    insulin lispro (HumALOG/ADMELOG) 100 units/mL subcutaneous injection 1-5 Units, 1-5 Units, Subcutaneous, TID AC **AND** Fingerstick Glucose (POCT), , , TID AC, Linda Moran MD    pantoprazole (PROTONIX) EC tablet 40 mg, 40 mg, Oral, Daily, Linda Moran MD, 40 mg at 09/19/24 0859    sodium chloride (OCEAN) 0.65 % nasal spray 2 spray, 2 spray, Each Nare, Q1H PRN, Linda Moran MD    tamsulosin (FLOMAX) capsule 0.8 mg, 0.8 mg, Oral, Daily With Dinner, Linda Moran MD, 0.8 mg at 09/18/24 1705    ticagrelor (BRILINTA) tablet 90 mg, 90 mg, Oral, Q12H SUSAN, Linda Moran MD, 90 mg at 09/19/24 0859     Labs & Results:        Results from last 7 days   Lab Units 09/19/24  0402 09/18/24  0925   WBC Thousand/uL 9.38 8.25   HEMOGLOBIN g/dL 13.0 13.2   HEMATOCRIT % 40.8 42.0   PLATELETS Thousands/uL 250 229     Results from last 7 days   Lab Units 09/18/24  0925   TRIGLYCERIDES mg/dL 62   HDL mg/dL 35*     Results from last 7 days   Lab Units 09/19/24  0402 09/18/24  0925   POTASSIUM mmol/L 3.8 4.0   CHLORIDE mmol/L 105 104   CO2 mmol/L 28 30   BUN mg/dL 40* 45*   CREATININE mg/dL 2.30* 2.49*   CALCIUM mg/dL 9.1 9.2   ALK PHOS U/L 69 72   ALT U/L 14 16   AST U/L 18 20     Results from last 7 days   Lab Units 09/18/24  0925   INR  1.72*   PTT seconds 36*     Results from last 7 days   Lab Units 09/19/24  0402   MAGNESIUM mg/dL 2.5

## 2024-09-19 NOTE — ASSESSMENT & PLAN NOTE
Not on oxygen at baseline  On 2 L at the time of admission  Now on room air  CXR with pulmonary venous congestion   Continue with IV diuretics  Respiratory protocol

## 2024-09-19 NOTE — ASSESSMENT & PLAN NOTE
Lab Results   Component Value Date    EGFR 26 09/19/2024    EGFR 24 09/18/2024    EGFR 27 (L) 08/17/2022    CREATININE 2.30 (H) 09/19/2024    CREATININE 2.49 (H) 09/18/2024    CREATININE 2.45 (H) 08/17/2022     Urinary retention protocol  Monitor BUN/creatinine while on IV diuretics  Hold losartan

## 2024-09-19 NOTE — ASSESSMENT & PLAN NOTE
>>ASSESSMENT AND PLAN FOR ESSENTIAL HYPERTENSION WRITTEN ON 9/19/2024 10:37 AM BY ROBER LEGGETT    -Goal normotension; avoid hypotension  -Medical management per primary team

## 2024-09-19 NOTE — ASSESSMENT & PLAN NOTE
"/92 (BP Location: Right arm)   Pulse 100   Temp 97.7 °F (36.5 °C) (Oral)   Resp 18   Ht 5' 11\" (1.803 m)   Wt 101 kg (222 lb 14.2 oz) Comment: Weighed patient twice. Patient being diuresed with IV lasix, so weight loss is expected  SpO2 94%   BMI 31.09 kg/m²     Hold home regimen of amlodipine, losartan, hydralazine and torsemide  Continue with IV diuretics  Resumed Toprol-XL on 9/19  Vitals as per unit protocol  "

## 2024-09-19 NOTE — ASSESSMENT & PLAN NOTE
>>ASSESSMENT AND PLAN FOR ACUTE CVA (CEREBROVASCULAR ACCIDENT) ([facility]) WRITTEN ON 9/19/2024  3:36 PM BY CAMMIE NAILS MD    Patient with prior history of TIA, A-fib on Eliquis HFrEF, hypertension hyperlipidemia NANCY on CPAP scented with left lower extremity weakness    CT head-with stable chronic microangiopathic changes  CTA head/neck-with proximal occlusion of left vertebral artery with diffuse hypoplasia.The V4 segment on the left also demonstrates either high-grade stenosis or short segment occlusion of the hypoplastic intracranial portion of the vessel.The right vertebral artery demonstrates a moderate to high-grade origin stenosis and moderate stenosis intracranially.There is a high-grade stenosis of the mid basilar artery.    Evaluated by neurology, recommend continue with stroke pathway  MRI brain : Recent infarcts in the white matter of right frontal parietal junction and left frontal cortex and adjacent subcortical white matter.  Bilateral involvement suggest embolic phenomena.  Possible subacute infarct of left occipital subcortical region  Echo-EF 30%, severe global hypokinesis, moderate to severe AAS  Lipid panel and HbA1c reviewed  Continue with aspirin  Hold home Eliquis, loaded with Brilinta, continue with Brilinta 90 twice daily   Normotension/euglycemia  Continue telemetry  PT/OT  Frequent neurochecks

## 2024-09-19 NOTE — ASSESSMENT & PLAN NOTE
74 y/o male with Afib on Eliquis, arthritis, CHF, CKD, COPD, DM, HTN, HLD, recent COVID infection, pacemaker, NANCY on CPAP, who presented on 9/18 with LLE weakness since 9/17. Stroke alert was initiated. BP on presentation 137/91. NIHSS 3- LLE weakness and L sensory deficit. Patient was deemed not a TNK candidate due to being outside of the appropriate time window/bleeding risk on Eliquis. Unclear etiology at this time. Workup as noted below.    Workup:  -CT head: Stable chronic microangiopathic change with no mass, hemorrhage or definitive signs of acute territorial infarction.   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 The V4 segment on the left also demonstrates either high-grade stenosis or short segment occlusion of the hypoplastic intracranial portion of the vessel.  The right vertebral artery demonstrates a moderate to high-grade origin stenosis and moderate stenosis intracranially.  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  -CT lumbar spine:  Diffuse lumbar spondylitic degenerative change with annular bulging, osteophyte formation and annular calcifications. Multilevel moderate canal stenosis and foraminal narrowing from L2-3 through L5-S1.  At L5-S1 foraminal narrowing is more severe. At L3-4 and L4-5 there is moderate bilateral foraminal narrowing including mass effect upon the exiting nerves, worst at the L3-4 level.  -Echo: EF 35%. Severe global hypokinesis. Bilateral atrium severely dilated.  -Labs: troponin 67, creatinine 2.49, d-dimer 1.12, BNP 1532, LDL 56    Plan:  - Stroke pathway  MRI brain  Hemoglobin A1c  Continue aspirin 81 mg and Brilinta 90 mg BID  Hold Eliquis for now  Pending MRI, will discuss further when able to reinitiate  Atorvastatin 40 mg  Goal normotension, avoid hypotension  Continue telemetry  PT/OT/ST  Frequent neuro checks. Continue to monitor and notify neurology with any changes.   - MRI lumbar spine  -  Medical management and supportive care per primary team. Correction of any metabolic or infectious disturbances.

## 2024-09-19 NOTE — ASSESSMENT & PLAN NOTE
Lab Results   Component Value Date    EGFR 26 09/19/2024    EGFR 24 09/18/2024    EGFR 27 (L) 08/17/2022    CREATININE 2.30 (H) 09/19/2024    CREATININE 2.49 (H) 09/18/2024    CREATININE 2.45 (H) 08/17/2022     -Medical management per primary team

## 2024-09-19 NOTE — ASSESSMENT & PLAN NOTE
Lab Results   Component Value Date    HGBA1C 7.4 (H) 09/18/2024       Recent Labs     09/18/24  0931 09/18/24  1718 09/19/24  0826   POCGLU 224* 102 120       Blood Sugar Average: Last 72 hrs:  (P) [id number]    -Medical management per primary team

## 2024-09-19 NOTE — PROGRESS NOTES
Progress Note - Hospitalist   Name: Dangelo Cooper 73 y.o. male I MRN: [id number]  Unit/Bed#: -01 I Date of Admission: 9/18/2024   Date of Service: 9/19/2024 I Hospital Day: 1    Assessment & Plan  Acute CVA (cerebrovascular accident) ([facility])  Patient with prior history of TIA, A-fib on Eliquis HFrEF, hypertension hyperlipidemia NANCY on CPAP scented with left lower extremity weakness    CT head-with stable chronic microangiopathic changes  CTA head/neck-with proximal occlusion of left vertebral artery with diffuse hypoplasia.The V4 segment on the left also demonstrates either high-grade stenosis or short segment occlusion of the hypoplastic intracranial portion of the vessel.The right vertebral artery demonstrates a moderate to high-grade origin stenosis and moderate stenosis intracranially.There is a high-grade stenosis of the mid basilar artery.    Evaluated by neurology, recommend continue with stroke pathway  MRI brain : Recent infarcts in the white matter of right frontal parietal junction and left frontal cortex and adjacent subcortical white matter.  Bilateral involvement suggest embolic phenomena.  Possible subacute infarct of left occipital subcortical region  Echo-EF 30%, severe global hypokinesis, moderate to severe AAS  Lipid panel and HbA1c reviewed  Continue with aspirin  Hold home Eliquis, loaded with Brilinta, continue with Brilinta 90 twice daily   Normotension/euglycemia  Continue telemetry  PT/OT  Frequent neurochecks  A-fib ([facility])  With history of paroxysmal atrial fibrillation  Home regimen consists of Toprol XL 50 mg daily and Eliquis 5 mg twice daily  Hold Eliquis  Continue with Toprol-XL  Continue with tele  Chronic obstructive pulmonary disease (COPD) ([facility])  Does not appear to be in exacerbation  On 2 L of oxygen secondary to volume overload, however on room air now  Continue with home bronchodilator therapy  Respiratory protocol  Type 2 diabetes mellitus, without long-term current use  "of insulin ([facility])  Lab Results   Component Value Date    HGBA1C 7.3 (H) 09/19/2024       Recent Labs     09/18/24  0931 09/18/24  1718 09/19/24  0826 09/19/24  1147   POCGLU 224* 102 120 158*       Blood Sugar Average: Last 72 hrs:  (P) 151    Continue with insulin on sliding scale.    Current sugars within goal  Essential hypertension  /92 (BP Location: Right arm)   Pulse 100   Temp 97.7 °F (36.5 °C) (Oral)   Resp 18   Ht 5' 11\" (1.803 m)   Wt 101 kg (222 lb 14.2 oz) Comment: Weighed patient twice. Patient being diuresed with IV lasix, so weight loss is expected  SpO2 94%   BMI 31.09 kg/m²     Hold home regimen of amlodipine, losartan, hydralazine and torsemide  Continue with IV diuretics  Resumed Toprol-XL on 9/19  Vitals as per unit protocol  Acute HFrEF (heart failure with reduced ejection fraction) ([facility])  Wt Readings from Last 3 Encounters:   09/19/24 101 kg (222 lb 14.2 oz)   09/10/24 108 kg (237 lb 6.4 oz)   10/27/23 119 kg (261 lb 12.8 oz)     Chest x-ray with pulmonary venous congestion   BNP-1532    Home regimen torsemide 40 mg daily, recently decreased to 20 mg daily  Patient reports compliance with regimen    Will continue with IV Lasix 40 twice daily  Daily weights and strict I's and O's  Salt restriction  Echo with EF of 30%, with moderate to severe AS, global hypokinesis  Appreciate cardiology's assistance        CKD (chronic kidney disease) stage 4, GFR 15-29 ml/min ([facility])  Lab Results   Component Value Date    EGFR 26 09/19/2024    EGFR 24 09/18/2024    EGFR 27 (L) 08/17/2022    CREATININE 2.30 (H) 09/19/2024    CREATININE 2.49 (H) 09/18/2024    CREATININE 2.45 (H) 08/17/2022     Urinary retention protocol  Monitor BUN/creatinine while on IV diuretics  Hold losartan     Obstructive sleep apnea  Continue with CPAP   Hypoxia  Not on oxygen at baseline  On 2 L at the time of admission  Now on room air  CXR with pulmonary venous congestion   Continue with IV diuretics  Respiratory " protocol  Pacemaker  noted  Non-ischemic myocardial injury (non-traumatic)  Lab Results   Component Value Date    HSTNI0 67 (H) 2024    HSTNI2 66 (H) 2024    HSTNID2 -1 2024      Secondary to acute heart failure  Denies any chest pain  Continue with telemetry monitoring  Cardiology following  Lumbar radiculopathy  CT with - Diffuse lumbar spondylitic degenerative change with annular bulging, osteophyte formation and annular calcifications. Multilevel moderate canal stenosis and foraminal narrowing from L2-3 through L5-S1.  At L5-S1 foraminal narrowing is more severe. At L3-4 and L4-5 there is moderate bilateral foraminal narrowing including mass effect upon the exiting nerves, worst at the L3-4 level    Discussed with neurosurgery, recommended getting MRI lumbar spine  Patient denies any red flag symptoms  Continue with conservative management for now    VTE Pharmacologic Prophylaxis:   Moderate Risk (Score 3-4) - Pharmacological DVT Prophylaxis Ordered: brillinta.    Mobility:   Basic Mobility Inpatient Raw Score: 18  JH-HLM Goal: 6: Walk 10 steps or more  JH-HLM Achieved: 6: Walk 10 steps or more  JH-HLM Goal achieved. Continue to encourage appropriate mobility.    Patient Centered Rounds: I performed bedside rounds with nursing staff today.   Discussions with Specialists or Other Care Team Provider: yes    Education and Discussions with Family / Patient: Updated  (wife and daughter) at bedside.    Current Length of Stay: 1 day(s)  Current Patient Status: Inpatient   Certification Statement: The patient will continue to require additional inpatient hospital stay due to pending stabilization  Discharge Plan:  until stabilized     Code Status: Level 1 - Full Code    Subjective   Seen and examined at bedside  Awake and alert   No complaints  Feels better    Objective     Vitals:   Temp (24hrs), Av.9 °F (36.6 °C), Min:97.7 °F (36.5 °C), Max:98.2 °F (36.8 °C)    Temp:  [97.7 °F (36.5  °C)-98.2 °F (36.8 °C)] 97.7 °F (36.5 °C)  HR:  [] 100  Resp:  [16-20] 18  BP: (124-163)/() 140/92  SpO2:  [92 %-99 %] 94 %  Body mass index is 31.09 kg/m².     Input and Output Summary (last 24 hours):     Intake/Output Summary (Last 24 hours) at 9/19/2024 1528  Last data filed at 9/19/2024 1511  Gross per 24 hour   Intake 1840 ml   Output 5500 ml   Net -3660 ml       Physical Exam  Vitals reviewed.   Constitutional:       Appearance: Normal appearance.   HENT:      Head: Atraumatic.      Mouth/Throat:      Mouth: Mucous membranes are dry.   Cardiovascular:      Heart sounds: Normal heart sounds.   Pulmonary:      Effort: No respiratory distress.      Comments: crackles  Abdominal:      General: Bowel sounds are normal.   Skin:     General: Skin is dry.      Capillary Refill: Capillary refill takes less than 2 seconds.   Neurological:      Mental Status: He is alert and oriented to person, place, and time.      Motor: Weakness present.          Lines/Drains:  Lines/Drains/Airways       Active Status       Name Placement date Placement time Site Days    External Urinary Catheter 09/18/24  1100  -- 1                      Telemetry:  Telemetry Orders (From admission, onward)               24 Hour Telemetry Monitoring  Continuous x 24 Hours (Telem)        Question:  Reason for 24 Hour Telemetry  Answer:  TIA/Suspected CVA/ Confirmed CVA                     Telemetry Reviewed:  paced  Indication for Continued Telemetry Use: Acute CVA               Lab Results: I have reviewed the following results: CBC/BMP:   .     09/19/24  0402   WBC 9.38   HGB 13.0   HCT 40.8      SODIUM 142   K 3.8      CO2 28   BUN 40*   CREATININE 2.30*   GLUC 130   MG 2.5    , Creatinine Clearance: Estimated Creatinine Clearance: 33.6 mL/min (A) (by C-G formula based on SCr of 2.3 mg/dL (H)).   Results from last 7 days   Lab Units 09/19/24  0402   WBC Thousand/uL 9.38   HEMOGLOBIN g/dL 13.0   HEMATOCRIT % 40.8   PLATELETS  Thousands/uL 250     Results from last 7 days   Lab Units 09/19/24  0402   SODIUM mmol/L 142   POTASSIUM mmol/L 3.8   CHLORIDE mmol/L 105   CO2 mmol/L 28   BUN mg/dL 40*   CREATININE mg/dL 2.30*   ANION GAP mmol/L 9   CALCIUM mg/dL 9.1   ALBUMIN g/dL 3.6   TOTAL BILIRUBIN mg/dL 1.78*   ALK PHOS U/L 69   ALT U/L 14   AST U/L 18   GLUCOSE RANDOM mg/dL 130     Results from last 7 days   Lab Units 09/18/24  0925   INR  1.72*     Results from last 7 days   Lab Units 09/19/24  1147 09/19/24  0826 09/18/24  1718 09/18/24  0931   POC GLUCOSE mg/dl 158* 120 102 224*     Results from last 7 days   Lab Units 09/19/24  0402 09/18/24  0925   HEMOGLOBIN A1C % 7.3* 7.4*           Recent Cultures (last 7 days):         Imaging Review: Reviewed radiology reports from this admission including: MRI brain.  Other Studies: No additional pertinent studies reviewed.    Last 24 Hours Medication List:     Current Facility-Administered Medications:     acetaminophen (TYLENOL) tablet 650 mg, Q6H PRN    albuterol (PROVENTIL HFA,VENTOLIN HFA) inhaler 2 puff, Q6H PRN    allopurinol (ZYLOPRIM) tablet 50 mg, Every Other Day    aspirin chewable tablet 81 mg, Daily    atorvastatin (LIPITOR) tablet 40 mg, QPM    docusate sodium (COLACE) capsule 100 mg, BID    Fluticasone Furoate-Vilanterol 100-25 mcg/actuation 1 puff, Daily    furosemide (LASIX) injection 40 mg, BID (diuretic)    insulin lispro (HumALOG/ADMELOG) 100 units/mL subcutaneous injection 1-5 Units, TID AC **AND** Fingerstick Glucose (POCT), TID AC    losartan (COZAAR) tablet 100 mg, Daily    metoprolol succinate (TOPROL-XL) 24 hr tablet 50 mg, Daily    pantoprazole (PROTONIX) EC tablet 40 mg, Daily    sodium chloride (OCEAN) 0.65 % nasal spray 2 spray, Q1H PRN    tamsulosin (FLOMAX) capsule 0.8 mg, Daily With Dinner    ticagrelor (BRILINTA) tablet 90 mg, Q12H SUSAN    Administrative Statements   Today, Patient Was Seen By: Linda Moran MD  I have spent a total time of 39 minutes in caring for  this patient on the day of the visit/encounter including .    **Please Note: This note may have been constructed using a voice recognition system.**

## 2024-09-19 NOTE — PLAN OF CARE
Problem: Neurological Deficit  Goal: Neurological status is stable or improving  Description: Interventions:  - Monitor and assess patient's level of consciousness, motor function, sensory function, and level of assistance needed for ADLs.   - Monitor and report changes from baseline. Collaborate with interdisciplinary team to initiate plan and implement interventions as ordered.   - Provide and maintain a safe environment.  - Consider seizure precautions.  - Consider fall precautions.  - Consider aspiration precautions.  - Consider bleeding precautions.  Outcome: Progressing     Problem: Activity Intolerance/Impaired Mobility  Goal: Mobility/activity is maintained at optimum level for patient  Description: Interventions:  - Assess and monitor patient  barriers to mobility and need for assistive/adaptive devices.  - Assess patient's emotional response to limitations.  - Collaborate with interdisciplinary team and initiate plans and interventions as ordered.  - Encourage independent activity per ability.  - Maintain proper body alignment.  - Perform active/passive rom as tolerated/ordered.  - Plan activities to conserve energy.  - Turn patient as appropriate  Outcome: Progressing     Problem: Communication Impairment  Goal: Ability to express needs and understand communication  Description: Assess patient's communication skills and ability to understand information.  Patient will demonstrate use of effective communication techniques, alternative methods of communication and understanding even if not able to speak.     - Encourage communication and provide alternate methods of communication as needed.  - Collaborate with case management/ for discharge needs.  - Include patient/family/caregiver in decisions related to communication.  Outcome: Progressing     Problem: Potential for Aspiration  Goal: Non-ventilated patient's risk of aspiration is minimized  Description: Assess and monitor vital signs,  respiratory status, and labs (WBC).  Monitor for signs of aspiration (tachypnea, cough, rales, wheezing, cyanosis, fever).    - Assess and monitor patient's ability to swallow.  - Place patient up in chair to eat if possible.  - HOB up at 90 degrees to eat if unable to get patient up into chair.  - Supervise patient during oral intake.   - Instruct patient/ family to take small bites.  - Instruct patient/ family to take small single sips when taking liquids.  - Follow patient-specific strategies generated by speech pathologist.  Outcome: Progressing  Goal: Ventilated patient's risk of aspiration is minimized  Description: Assess and monitor vital signs, respiratory status, airway cuff pressure, and labs (WBC).  Monitor for signs of aspiration (tachypnea, cough, rales, wheezing, cyanosis, fever).  Outcome: Progressing     Problem: Nutrition  Goal: Nutrition/Hydration status is improving  Description: Monitor and assess patient's nutrition/hydration status for malnutrition (ex- brittle hair, bruises, dry skin, pale skin and conjunctiva, muscle wasting, smooth red tongue, and disorientation). Collaborate with interdisciplinary team and initiate plan and interventions as ordered.  Monitor patient's weight and dietary intake as ordered or per policy. Utilize nutrition screening tool and intervene per policy. Determine patient's food preferences and provide high-protein, high-caloric foods as appropriate.     - Assist patient with eating.  - Allow adequate time for meals.  - Encourage patient to take dietary supplement as ordered.  - Collaborate with clinical nutritionist.  - Include patient/family/caregiver in decisions related to nutrition.  Outcome: Progressing     Problem: Prexisting or High Potential for Compromised Skin Integrity  Goal: Skin integrity is maintained or improved  Description: INTERVENTIONS:  - Identify patients at risk for skin breakdown  - Assess and monitor skin integrity  - Assess and monitor  nutrition and hydration status  - Monitor labs   - Assess for incontinence   - Turn and reposition patient  - Assist with mobility/ambulation  - Relieve pressure over bony prominences  - Avoid friction and shearing  - Provide appropriate hygiene as needed including keeping skin clean and dry  - Evaluate need for skin moisturizer/barrier cream  - Collaborate with interdisciplinary team   - Patient/family teaching  - Consider wound care consult   Outcome: Progressing     Problem: CARDIOVASCULAR - ADULT  Goal: Maintains optimal cardiac output and hemodynamic stability  Description: INTERVENTIONS:  - Monitor I/O, vital signs and rhythm  - Monitor for S/S and trends of decreased cardiac output  - Administer and titrate ordered vasoactive medications to optimize hemodynamic stability  - Assess quality of pulses, skin color and temperature  - Assess for signs of decreased coronary artery perfusion  - Instruct patient to report change in severity of symptoms  Outcome: Progressing  Goal: Absence of cardiac dysrhythmias or at baseline rhythm  Description: INTERVENTIONS:  - Continuous cardiac monitoring, vital signs, obtain 12 lead EKG if ordered  - Administer antiarrhythmic and heart rate control medications as ordered  - Monitor electrolytes and administer replacement therapy as ordered  Outcome: Progressing     Problem: METABOLIC, FLUID AND ELECTROLYTES - ADULT  Goal: Electrolytes maintained within normal limits  Description: INTERVENTIONS:  - Monitor labs and assess patient for signs and symptoms of electrolyte imbalances  - Administer electrolyte replacement as ordered  - Monitor response to electrolyte replacements, including repeat lab results as appropriate  - Instruct patient on fluid and nutrition as appropriate  Outcome: Progressing  Goal: Fluid balance maintained  Description: INTERVENTIONS:  - Monitor labs   - Monitor I/O and WT  - Instruct patient on fluid and nutrition as appropriate  - Assess for signs &  symptoms of volume excess or deficit  Outcome: Progressing  Goal: Glucose maintained within target range  Description: INTERVENTIONS:  - Monitor Blood Glucose as ordered  - Assess for signs and symptoms of hyperglycemia and hypoglycemia  - Administer ordered medications to maintain glucose within target range  - Assess nutritional intake and initiate nutrition service referral as needed  Outcome: Progressing     Problem: MUSCULOSKELETAL - ADULT  Goal: Maintain or return mobility to safest level of function  Description: INTERVENTIONS:  - Assess patient's ability to carry out ADLs; assess patient's baseline for ADL function and identify physical deficits which impact ability to perform ADLs (bathing, care of mouth/teeth, toileting, grooming, dressing, etc.)  - Assess/evaluate cause of self-care deficits   - Assess range of motion  - Assess patient's mobility  - Assess patient's need for assistive devices and provide as appropriate  - Encourage maximum independence but intervene and supervise when necessary  - Involve family in performance of ADLs  - Assess for home care needs following discharge   - Consider OT consult to assist with ADL evaluation and planning for discharge  - Provide patient education as appropriate  Outcome: Progressing  Goal: Maintain proper alignment of affected body part  Description: INTERVENTIONS:  - Support, maintain and protect limb and body alignment  - Provide patient/ family with appropriate education  Outcome: Progressing     Problem: SAFETY ADULT  Goal: Patient will remain free of falls  Description: INTERVENTIONS:  - Educate patient/family on patient safety including physical limitations  - Instruct patient to call for assistance with activity   - Consult OT/PT to assist with strengthening/mobility   - Keep Call bell within reach  - Keep bed low and locked with side rails adjusted as appropriate  - Keep care items and personal belongings within reach  - Initiate and maintain comfort  rounds  - Make Fall Risk Sign visible to staff  - Offer Toileting every 2 Hours, in advance of need  - Initiate/Maintain bed/chair alarm  - Obtain necessary fall risk management equipment: yellow bracelet, yellow socks, bed/chair alarm  - Apply yellow socks and bracelet for high fall risk patients  - Consider moving patient to room near nurses station  Outcome: Progressing  Goal: Maintain or return to baseline ADL function  Description: INTERVENTIONS:  -  Assess patient's ability to carry out ADLs; assess patient's baseline for ADL function and identify physical deficits which impact ability to perform ADLs (bathing, care of mouth/teeth, toileting, grooming, dressing, etc.)  - Assess/evaluate cause of self-care deficits   - Assess range of motion  - Assess patient's mobility; develop plan if impaired  - Assess patient's need for assistive devices and provide as appropriate  - Encourage maximum independence but intervene and supervise when necessary  - Involve family in performance of ADLs  - Assess for home care needs following discharge   - Consider OT consult to assist with ADL evaluation and planning for discharge  - Provide patient education as appropriate  Outcome: Progressing  Goal: Maintains/Returns to pre admission functional level  Description: INTERVENTIONS:  - Perform AM-PAC 6 Click Basic Mobility/ Daily Activity assessment daily.  - Set and communicate daily mobility goal to care team and patient/family/caregiver.   - Collaborate with rehabilitation services on mobility goals if consulted  - Perform Range of Motion 5 times a day.  - Reposition patient every 2 hours.  - Dangle patient 3 times a day  - Stand patient 5 times a day  - Ambulate patient 3 times a day  - Out of bed to chair 5 times a day   - Out of bed for meals 3 times a day  - Out of bed for toileting  - Record patient progress and toleration of activity level   Outcome: Progressing     Problem: DISCHARGE PLANNING  Goal: Discharge to home or  other facility with appropriate resources  Description: INTERVENTIONS:  - Identify barriers to discharge w/patient and caregiver  - Arrange for needed discharge resources and transportation as appropriate  - Identify discharge learning needs (meds, wound care, etc.)  - Arrange for interpretive services to assist at discharge as needed  - Refer to Case Management Department for coordinating discharge planning if the patient needs post-hospital services based on physician/advanced practitioner order or complex needs related to functional status, cognitive ability, or social support system  Outcome: Progressing     Problem: Knowledge Deficit  Goal: Patient/family/caregiver demonstrates understanding of disease process, treatment plan, medications, and discharge instructions  Description: Complete learning assessment and assess knowledge base.  Interventions:  - Provide teaching at level of understanding  - Provide teaching via preferred learning methods  Outcome: Progressing

## 2024-09-19 NOTE — ASSESSMENT & PLAN NOTE
With history of paroxysmal atrial fibrillation  Home regimen consists of Toprol XL 50 mg daily and Eliquis 5 mg twice daily  Hold Eliquis  Continue with Toprol-XL  Continue with tele

## 2024-09-20 ENCOUNTER — TELEPHONE (OUTPATIENT)
Age: 74
End: 2024-09-20

## 2024-09-20 DIAGNOSIS — I50.20 HFREF (HEART FAILURE WITH REDUCED EJECTION FRACTION) (HCC): Primary | ICD-10-CM

## 2024-09-20 LAB
ANION GAP SERPL CALCULATED.3IONS-SCNC: 11 MMOL/L (ref 4–13)
ATRIAL RATE: 74 BPM
ATRIAL RATE: 77 BPM
BASOPHILS # BLD AUTO: 0.06 THOUSANDS/ΜL (ref 0–0.1)
BASOPHILS NFR BLD AUTO: 1 % (ref 0–1)
BUN SERPL-MCNC: 39 MG/DL (ref 5–25)
CALCIUM SERPL-MCNC: 9.4 MG/DL (ref 8.4–10.2)
CHLORIDE SERPL-SCNC: 102 MMOL/L (ref 96–108)
CO2 SERPL-SCNC: 26 MMOL/L (ref 21–32)
CREAT SERPL-MCNC: 2.14 MG/DL (ref 0.6–1.3)
EOSINOPHIL # BLD AUTO: 0.26 THOUSAND/ΜL (ref 0–0.61)
EOSINOPHIL NFR BLD AUTO: 2 % (ref 0–6)
ERYTHROCYTE [DISTWIDTH] IN BLOOD BY AUTOMATED COUNT: 17.7 % (ref 11.6–15.1)
GFR SERPL CREATININE-BSD FRML MDRD: 29 ML/MIN/1.73SQ M
GLUCOSE SERPL-MCNC: 104 MG/DL (ref 65–140)
GLUCOSE SERPL-MCNC: 108 MG/DL (ref 65–140)
GLUCOSE SERPL-MCNC: 129 MG/DL (ref 65–140)
GLUCOSE SERPL-MCNC: 282 MG/DL (ref 65–140)
HCT VFR BLD AUTO: 45.4 % (ref 36.5–49.3)
HGB BLD-MCNC: 14 G/DL (ref 12–17)
IMM GRANULOCYTES # BLD AUTO: 0.07 THOUSAND/UL (ref 0–0.2)
IMM GRANULOCYTES NFR BLD AUTO: 1 % (ref 0–2)
LYMPHOCYTES # BLD AUTO: 1.22 THOUSANDS/ΜL (ref 0.6–4.47)
LYMPHOCYTES NFR BLD AUTO: 11 % (ref 14–44)
MAGNESIUM SERPL-MCNC: 2.4 MG/DL (ref 1.9–2.7)
MCH RBC QN AUTO: 27.1 PG (ref 26.8–34.3)
MCHC RBC AUTO-ENTMCNC: 30.8 G/DL (ref 31.4–37.4)
MCV RBC AUTO: 88 FL (ref 82–98)
MONOCYTES # BLD AUTO: 1 THOUSAND/ΜL (ref 0.17–1.22)
MONOCYTES NFR BLD AUTO: 9 % (ref 4–12)
NEUTROPHILS # BLD AUTO: 8.42 THOUSANDS/ΜL (ref 1.85–7.62)
NEUTS SEG NFR BLD AUTO: 76 % (ref 43–75)
NRBC BLD AUTO-RTO: 0 /100 WBCS
PLATELET # BLD AUTO: 292 THOUSANDS/UL (ref 149–390)
PMV BLD AUTO: 9.6 FL (ref 8.9–12.7)
POTASSIUM SERPL-SCNC: 3.7 MMOL/L (ref 3.5–5.3)
QRS AXIS: 260 DEGREES
QRS AXIS: 261 DEGREES
QRSD INTERVAL: 162 MS
QRSD INTERVAL: 176 MS
QT INTERVAL: 478 MS
QT INTERVAL: 486 MS
QTC INTERVAL: 516 MS
QTC INTERVAL: 549 MS
RBC # BLD AUTO: 5.16 MILLION/UL (ref 3.88–5.62)
SODIUM SERPL-SCNC: 139 MMOL/L (ref 135–147)
T WAVE AXIS: 76 DEGREES
T WAVE AXIS: 79 DEGREES
VENTRICULAR RATE: 70 BPM
VENTRICULAR RATE: 77 BPM
WBC # BLD AUTO: 11.03 THOUSAND/UL (ref 4.31–10.16)

## 2024-09-20 PROCEDURE — 85025 COMPLETE CBC W/AUTO DIFF WBC: CPT | Performed by: INTERNAL MEDICINE

## 2024-09-20 PROCEDURE — 83735 ASSAY OF MAGNESIUM: CPT | Performed by: INTERNAL MEDICINE

## 2024-09-20 PROCEDURE — 93010 ELECTROCARDIOGRAM REPORT: CPT | Performed by: INTERNAL MEDICINE

## 2024-09-20 PROCEDURE — 94660 CPAP INITIATION&MGMT: CPT

## 2024-09-20 PROCEDURE — 99232 SBSQ HOSP IP/OBS MODERATE 35: CPT | Performed by: INTERNAL MEDICINE

## 2024-09-20 PROCEDURE — 97163 PT EVAL HIGH COMPLEX 45 MIN: CPT

## 2024-09-20 PROCEDURE — 94760 N-INVAS EAR/PLS OXIMETRY 1: CPT

## 2024-09-20 PROCEDURE — 82948 REAGENT STRIP/BLOOD GLUCOSE: CPT

## 2024-09-20 PROCEDURE — 92526 ORAL FUNCTION THERAPY: CPT

## 2024-09-20 PROCEDURE — 97166 OT EVAL MOD COMPLEX 45 MIN: CPT

## 2024-09-20 PROCEDURE — 80048 BASIC METABOLIC PNL TOTAL CA: CPT | Performed by: INTERNAL MEDICINE

## 2024-09-20 PROCEDURE — NC001 PR NO CHARGE: Performed by: STUDENT IN AN ORGANIZED HEALTH CARE EDUCATION/TRAINING PROGRAM

## 2024-09-20 RX ORDER — DABIGATRAN ETEXILATE 150 MG/1
150 CAPSULE ORAL 2 TIMES DAILY
Qty: 60 CAPSULE | Refills: 0 | Status: SHIPPED | OUTPATIENT
Start: 2024-09-20 | End: 2024-09-20

## 2024-09-20 RX ORDER — SPIRONOLACTONE 25 MG/1
25 TABLET ORAL DAILY
Status: DISCONTINUED | OUTPATIENT
Start: 2024-09-20 | End: 2024-09-22 | Stop reason: HOSPADM

## 2024-09-20 RX ADMIN — GUAIFENESIN 600 MG: 600 TABLET ORAL at 21:39

## 2024-09-20 RX ADMIN — SPIRONOLACTONE 25 MG: 25 TABLET ORAL at 09:55

## 2024-09-20 RX ADMIN — RIVAROXABAN 15 MG: 15 TABLET, FILM COATED ORAL at 12:12

## 2024-09-20 RX ADMIN — PANTOPRAZOLE SODIUM 40 MG: 40 TABLET, DELAYED RELEASE ORAL at 08:34

## 2024-09-20 RX ADMIN — DOCUSATE SODIUM 100 MG: 100 CAPSULE, LIQUID FILLED ORAL at 08:34

## 2024-09-20 RX ADMIN — FLUTICASONE FUROATE AND VILANTEROL TRIFENATATE 1 PUFF: 100; 25 POWDER RESPIRATORY (INHALATION) at 08:35

## 2024-09-20 RX ADMIN — GUAIFENESIN 600 MG: 600 TABLET ORAL at 08:34

## 2024-09-20 RX ADMIN — FUROSEMIDE 40 MG: 10 INJECTION, SOLUTION INTRAVENOUS at 08:34

## 2024-09-20 RX ADMIN — ASPIRIN 81 MG CHEWABLE TABLET 81 MG: 81 TABLET CHEWABLE at 08:34

## 2024-09-20 RX ADMIN — FUROSEMIDE 40 MG: 10 INJECTION, SOLUTION INTRAVENOUS at 16:29

## 2024-09-20 RX ADMIN — INSULIN LISPRO 3 UNITS: 100 INJECTION, SOLUTION INTRAVENOUS; SUBCUTANEOUS at 12:13

## 2024-09-20 RX ADMIN — METOPROLOL SUCCINATE 50 MG: 50 TABLET, EXTENDED RELEASE ORAL at 08:34

## 2024-09-20 RX ADMIN — TAMSULOSIN HYDROCHLORIDE 0.8 MG: 0.4 CAPSULE ORAL at 16:28

## 2024-09-20 RX ADMIN — ATORVASTATIN CALCIUM 40 MG: 40 TABLET, FILM COATED ORAL at 17:10

## 2024-09-20 RX ADMIN — DOCUSATE SODIUM 100 MG: 100 CAPSULE, LIQUID FILLED ORAL at 17:10

## 2024-09-20 NOTE — ASSESSMENT & PLAN NOTE
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    Evaluated by neurology, recommend continue with stroke pathway  MRI brain : Recent infarcts in the white matter of right frontal parietal junction and left frontal cortex and adjacent subcortical white matter.  Bilateral involvement suggest embolic phenomena.  Possible subacute infarct of left occipital subcortical region  Echo-EF 30%, severe global hypokinesis, moderate to severe AAS  Lipid panel and HbA1c reviewed  Continue with aspirin  Eliquis discontinued, started on Xarelto, as acute CVA considered Eliquis failure  Normotension/euglycemia  Continue telemetry  PT/OT-no rehab needs

## 2024-09-20 NOTE — ASSESSMENT & PLAN NOTE
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    Evaluated by neurology, recommend continue with stroke pathway  MRI brain : Recent infarcts in the white matter of right frontal parietal junction and left frontal cortex and adjacent subcortical white matter.  Bilateral involvement suggest embolic phenomena.  Possible subacute infarct of left occipital subcortical region  Echo-EF 30%, severe global hypokinesis, moderate to severe AAS  Lipid panel and HbA1c reviewed  Continue with aspirin  Eliquis discontinued, started on Xarelto, as acute CVA considered Eliquis failure  Normotension/euglycemia  Continue telemetry  PT/OT-no rehab needs

## 2024-09-20 NOTE — OCCUPATIONAL THERAPY NOTE
Occupational Therapy Evaluation     Patient Name: Dangelo Cooper  Today's Date: 9/20/2024  Problem List  Principal Problem:    Acute CVA (cerebrovascular accident) ([facility])  Active Problems:    Chronic obstructive pulmonary disease (COPD) ([facility])    Type 2 diabetes mellitus, without long-term current use of insulin ([facility])    Essential hypertension    Acute HFrEF (heart failure with reduced ejection fraction) ([facility])    CKD (chronic kidney disease) stage 4, GFR 15-29 ml/min ([facility])    Obstructive sleep apnea    Hypoxia    Pacemaker    A-fib ([facility])    Non-ischemic myocardial injury (non-traumatic)    Lumbar radiculopathy    Past Medical History  Past Medical History:   Diagnosis Date    A-fib ([facility])     Arthritis     CHF (congestive heart failure) ([facility])     Chronic kidney disease don't remember    COPD (chronic obstructive pulmonary disease) ([facility])     Diabetes mellitus ([facility])     Enlarged prostate     Gout     Heart murmur birth    Hyperlipidemia     Hypertension      Past Surgical History  Past Surgical History:   Procedure Laterality Date    A-V CARDIAC PACEMAKER INSERTION  08/2022    EAR SURGERY      EYE SURGERY  2021    cat. repaired    FRACTURE SURGERY               09/20/24 1121   OT Last Visit   OT Visit Date 09/20/24   Note Type   Note type Evaluation   Pain Assessment   Pain Assessment Tool 0-10   Pain Score No Pain   Restrictions/Precautions   Weight Bearing Precautions Per Order No   Other Precautions Telemetry;Chair Alarm   Home Living   Type of Home House   Home Layout One level  (2 CHARLI)   Bathroom Shower/Tub Walk-in shower   Bathroom Equipment Built-in shower seat;Grab bars in shower   Home Equipment Walker;Cane   Additional Comments Not using DME PTA   Prior Function   Level of [place] Independent with ADLs;Independent with functional mobility;Independent with IADLS   Lives With Spouse   Receives Help From Family   IADLs Independent with driving;Independent with meal prep;Independent with medication  "management   Falls in the last 6 months 0   General   Family/Caregiver Present No   Subjective   Subjective \"I guess I had some strokes\"   ADL   Eating Assistance 7  Independent   Grooming Assistance 7  Independent   UB Bathing Assistance 7  Independent   LB Bathing Assistance 4  Minimal Assistance   UB Dressing Assistance 7  Independent   LB Dressing Assistance 4  Minimal Assistance   Toileting Assistance  5  Supervision/Setup   Bed Mobility   Sit to Supine Unable to assess   Additional Comments Received OOB in chair   Transfers   Sit to Stand 5  Supervision   Additional items Armrests   Stand to Sit 5  Supervision   Additional items Armrests   Functional Mobility   Functional Mobility 5  Supervision   Additional Comments Greater than functional household distance in unit almanzar. Pt with (+) trendelenburg gait  - L hip abductor weakness   Additional items Rolling walker   Balance   Static Sitting Normal   Dynamic Sitting Good   Static Standing Good   Dynamic Standing Good   Ambulatory Fair +   Activity Tolerance   Activity Tolerance Patient tolerated treatment well   Medical Staff Made Aware PT Edmundo   Nurse Made Aware RN Sonia   RUE Assessment   RUE Assessment WFL   LUE Assessment   LUE Assessment WFL   Hand Function   Gross Motor Coordination Functional   Fine Motor Coordination Functional   Sensation   Light Touch No apparent deficits   Vision-Basic Assessment   Current Vision Wears glasses all the time   Cognition   Overall Cognitive Status WFL   Arousal/Participation Alert   Attention Within functional limits   Orientation Level Oriented X4   Memory Within functional limits   Following Commands Follows all commands and directions without difficulty   Assessment   Prognosis Good   Assessment Pt is a 73 y.o. male seen for OT evaluation at [facility], admitted 9/18/2024 w/ Acute CVA (cerebrovascular accident) (HCC). OT completed expanded review of pt's medical and social history. Comorbidities affecting " pt's functional performance at time of assessment include: COPD, DM2, HTN, CKD, pacemaker, hypoxia, a-fib, NSTEMI, morbid obesity. Prior to admission, pt was living with his wife in a 1SH with 2 CHARLI. Pt was IND with I/ADLs & mobility. Upon evaluation, pt presents to OT as IND for UB ADLs, Min A for LB ADLs & S for mobility with RW. Pt reports his wife will assist him with LB dressing at home. Pt reports he will utilized RW at home. Based on findings, pt is of moderate complexity. The patient's raw score on the AM-PAC Daily Activity inpatient short form is 21, standardized score is 44.27, greater than 39.4. Patients at this level are likely to benefit from DC to home. Please refer to the recommendation of the Occupational Therapist for safe DC planning. At this time, OT recommendations at time of discharge are no OT needs. No further acute OT needs indicated at this time - Recommend pt continue to be OOB for meals, ambulation to/from BR, perform self care tasks, and mobility in hallway with nursing. D/C from OT caseload with above recommendations.   Goals   Patient Goals Pt wants to go home   Plan   OT Frequency Eval only   Discharge Recommendation   Rehab Resource Intensity Level, OT No post-acute rehabilitation needs   Additional Comments  Recommend pt utilize RW for mobility at DC - pt agreeable. Pt states his wife can help him with LB dressing at home   AM-PAC Daily Activity Inpatient   Lower Body Dressing 3   Bathing 3   Toileting 3   Upper Body Dressing 4   Grooming 4   Eating 4   Daily Activity Raw Score 21   Daily Activity Standardized Score (Calc for Raw Score >=11) 44.27   AM-PAC Applied Cognition Inpatient   Following a Speech/Presentation 4   Understanding Ordinary Conversation 4   Taking Medications 4   Remembering Where Things Are Placed or Put Away 4   Remembering List of 4-5 Errands 4   Taking Care of Complicated Tasks 4   Applied Cognition Raw Score 24   Applied Cognition Standardized Score 62.21    End of Consult   Education Provided Yes   Patient Position at End of Consult Bedside chair;Bed/Chair alarm activated;All needs within reach   Nurse Communication Nurse aware of consult     Mylene Young, OTR/L

## 2024-09-20 NOTE — ASSESSMENT & PLAN NOTE
Troponin trend: 67/66 in the setting of acute CHF and CVA.  Denies chest pain or anginal equivalent. No ischemia noted on EKG.

## 2024-09-20 NOTE — ASSESSMENT & PLAN NOTE
Wt Readings from Last 3 Encounters:   09/20/24 98.7 kg (217 lb 8 oz)   09/10/24 108 kg (237 lb 6.4 oz)   10/27/23 119 kg (261 lb 12.8 oz)     Chest x-ray with pulmonary venous congestion   BNP-1532    Home regimen torsemide 40 mg daily, recently decreased to 20 mg daily  Patient reports compliance with regimen    Will continue with IV Lasix 40 twice daily, continue for another 24-48 hours   Daily weights and strict I's and O's  Salt restriction  Echo with EF of 30%, with moderate to severe AS, global hypokinesis  Life vest prior to discharge  Appreciate cardiology's assistance

## 2024-09-20 NOTE — ASSESSMENT & PLAN NOTE
"/100 (BP Location: Right arm)   Pulse 85   Temp (!) 97.4 °F (36.3 °C) (Oral)   Resp 18   Ht 5' 11\" (1.803 m)   Wt 98.7 kg (217 lb 8 oz) Comment: Standing scale,  aware per rounding  SpO2 97%   BMI 30.34 kg/m²     Hold home regimen of amlodipine, losartan, hydralazine and torsemide  Continue with IV diuretics  Resumed Toprol-XL on 9/19  Aldactone 25 mg daily added 9/20   Vitals as per unit protocol  "

## 2024-09-20 NOTE — ASSESSMENT & PLAN NOTE
>>ASSESSMENT AND PLAN FOR ACUTE CVA (CEREBROVASCULAR ACCIDENT) (HCC) WRITTEN ON 9/20/2024  1:04 PM BY HAYES DANGELO PA-C    Presented with left leg weakness and gait instability.  MRI brain shows recent infarcts within the deep white matter of the right frontal parietal junction and left frontal cortex and adjacent subcortical white matter.  Bilateral involvement suggesting embolic phenomena.  Possible subacute infarct left occipital subcortical region.  Moderate to advanced chronic microangiopathic changes.  Patient was on ASA, statin and Eliquis prior to this admission. Reports compliance of the same. We should assume Eliquis failure at this point and recommend switching him to an alternative agent, likely Xarelto 15mg QD or Pradaxa 75mg BID (CrCl=42mL/min). Per neurology, plan to continue dual therapy by discontinuing Brilinta and continuing ASA. Continue statin.

## 2024-09-20 NOTE — ASSESSMENT & PLAN NOTE
Permissive hypertension allowed on admission. Now goal normotension.  Last /100.  Resumed on Toprol-XL 50 mg daily and added for Aldactone 25 mg daily.

## 2024-09-20 NOTE — PROGRESS NOTES
Progress Note - Hospitalist   Name: Dangelo Cooper 73 y.o. 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    Evaluated by neurology, recommend continue with stroke pathway  MRI brain : Recent infarcts in the white matter of right frontal parietal junction and left frontal cortex and adjacent subcortical white matter.  Bilateral involvement suggest embolic phenomena.  Possible subacute infarct of left occipital subcortical region  Echo-EF 30%, severe global hypokinesis, moderate to severe AAS  Lipid panel and HbA1c reviewed  Continue with aspirin  Eliquis discontinued, started on Xarelto, as acute CVA considered Eliquis failure  Normotension/euglycemia  Continue telemetry  PT/OT-no rehab needs    A-fib ([facility])  With history of paroxysmal atrial fibrillation  Home regimen consists of Toprol XL 50 mg daily and Eliquis 5 mg twice daily  Eliquis replaced by Xarelto  Continue with Toprol-XL  Continue with tele  Chronic obstructive pulmonary disease (COPD) ([facility])  Does not appear to be in exacerbation  On 2 L of oxygen secondary to volume overload, however on room air now  Continue with home bronchodilator therapy  Respiratory protocol  Type 2 diabetes mellitus, without long-term  "current use of insulin ([facility])  Lab Results   Component Value Date    HGBA1C 7.3 (H) 09/19/2024       Recent Labs     09/19/24  1147 09/19/24  1555 09/20/24  0833 09/20/24  1041   POCGLU 158* 149* 108 282*       Blood Sugar Average: Last 72 hrs:  (P) 163.0450794103379530    Continue with insulin on sliding scale.    Current sugars within goal  Essential hypertension  /100 (BP Location: Right arm)   Pulse 85   Temp (!) 97.4 °F (36.3 °C) (Oral)   Resp 18   Ht 5' 11\" (1.803 m)   Wt 98.7 kg (217 lb 8 oz) Comment: Standing scale,  aware per rounding  SpO2 97%   BMI 30.34 kg/m²     Hold home regimen of amlodipine, losartan, hydralazine and torsemide  Continue with IV diuretics  Resumed Toprol-XL on 9/19  Aldactone 25 mg daily added 9/20   Vitals as per unit protocol  Acute HFrEF (heart failure with reduced ejection fraction) ([facility])  Wt Readings from Last 3 Encounters:   09/20/24 98.7 kg (217 lb 8 oz)   09/10/24 108 kg (237 lb 6.4 oz)   10/27/23 119 kg (261 lb 12.8 oz)     Chest x-ray with pulmonary venous congestion   BNP-1532    Home regimen torsemide 40 mg daily, recently decreased to 20 mg daily  Patient reports compliance with regimen    Will continue with IV Lasix 40 twice daily, continue for another 24-48 hours   Daily weights and strict I's and O's  Salt restriction  Echo with EF of 30%, with moderate to severe AS, global hypokinesis  Life vest prior to discharge  Appreciate cardiology's assistance        CKD (chronic kidney disease) stage 4, GFR 15-29 ml/min ([facility])  Lab Results   Component Value Date    EGFR 29 09/20/2024    EGFR 26 09/19/2024    EGFR 24 09/18/2024    CREATININE 2.14 (H) 09/20/2024    CREATININE 2.30 (H) 09/19/2024    CREATININE 2.49 (H) 09/18/2024     Urinary retention protocol  Monitor BUN/creatinine while on IV diuretics  Hold losartan     Obstructive sleep apnea  Continue with CPAP   Hypoxia  Not on oxygen at baseline  On 2 L at the time of admission  Now on room air  CXR with " pulmonary venous congestion   Continue with IV diuretics  Respiratory protocol  Pacemaker  noted  Non-ischemic myocardial injury (non-traumatic)  Lab Results   Component Value Date    HSTNI0 67 (H) 09/18/2024    HSTNI2 66 (H) 09/18/2024    HSTNID2 -1 09/18/2024      Secondary to acute heart failure  Denies any chest pain  Continue with telemetry monitoring  Cardiology following  Lumbar radiculopathy  CT with - Diffuse lumbar spondylitic degenerative change with annular bulging, osteophyte formation and annular calcifications. Multilevel moderate canal stenosis and foraminal narrowing from L2-3 through L5-S1.  At L5-S1 foraminal narrowing is more severe. At L3-4 and L4-5 there is moderate bilateral foraminal narrowing including mass effect upon the exiting nerves, worst at the L3-4 level    Discussed with neurosurgery, recommended getting MRI lumbar spine that shows Multilevel lumbar spondylosis, as described above, contributing to at most severe canal stenosis at L3-L4, and multilevel neural foraminal stenosis, worst on the left at L3-L4 on the right at L4-S1.   Patient denies any red flag symptoms  Continue with conservative management for now    VTE Pharmacologic Prophylaxis:   Moderate Risk (Score 3-4) - Pharmacological DVT Prophylaxis Ordered: brillinta.    Mobility:   Basic Mobility Inpatient Raw Score: 22  JH-HLM Goal: 7: Walk 25 feet or more  JH-HLM Achieved: 8: Walk 250 feet ot more  JH-HLM Goal achieved. Continue to encourage appropriate mobility.    Patient Centered Rounds: I performed bedside rounds with nursing staff today.   Discussions with Specialists or Other Care Team Provider: yes    Education and Discussions with Family / Patient: Updated  (wife and daughter) at bedside.    Current Length of Stay: 2 day(s)  Current Patient Status: Inpatient   Certification Statement: The patient will continue to require additional inpatient hospital stay due to pending stabilization  Discharge Plan:   until stabilized     Code Status: Level 1 - Full Code    Subjective   Seen and examined at bedside  Awake and alert   No complaints  Feels better    Objective     Vitals:   Temp (24hrs), Av.6 °F (36.4 °C), Min:97.4 °F (36.3 °C), Max:97.9 °F (36.6 °C)    Temp:  [97.4 °F (36.3 °C)-97.9 °F (36.6 °C)] 97.4 °F (36.3 °C)  HR:  [67-85] 85  Resp:  [18-20] 18  BP: (137-150)/() 138/100  SpO2:  [93 %-97 %] 97 %  Body mass index is 30.34 kg/m².     Input and Output Summary (last 24 hours):     Intake/Output Summary (Last 24 hours) at 2024 1554  Last data filed at 2024 1440  Gross per 24 hour   Intake 2110 ml   Output 2100 ml   Net 10 ml       Physical Exam  Vitals reviewed.   Constitutional:       Appearance: Normal appearance.   HENT:      Head: Atraumatic.      Mouth/Throat:      Mouth: Mucous membranes are dry.   Cardiovascular:      Heart sounds: Normal heart sounds.   Pulmonary:      Effort: No respiratory distress.      Comments: crackles  Abdominal:      General: Bowel sounds are normal.   Skin:     General: Skin is dry.      Capillary Refill: Capillary refill takes less than 2 seconds.   Neurological:      Mental Status: He is alert and oriented to person, place, and time.      Motor: Weakness present.          Lines/Drains:  Lines/Drains/Airways       Active Status       Name Placement date Placement time Site Days    External Urinary Catheter Small 24  -- less than 1                      Telemetry:  Telemetry Orders (From admission, onward)               24 Hour Telemetry Monitoring  Continuous x 24 Hours (Telem)        Question:  Reason for 24 Hour Telemetry  Answer:  TIA/Suspected CVA/ Confirmed CVA                     Telemetry Reviewed:  paced  Indication for Continued Telemetry Use: Acute CVA               Lab Results: I have reviewed the following results: CBC/BMP:   .     24  0442   WBC 11.03*   HGB 14.0   HCT 45.4      SODIUM 139   K 3.7      CO2 26   BUN 39*    CREATININE 2.14*   GLUC 129   MG 2.4    , Creatinine Clearance: Estimated Creatinine Clearance: 35.7 mL/min (A) (by C-G formula based on SCr of 2.14 mg/dL (H)).   Results from last 7 days   Lab Units 09/20/24  0442   WBC Thousand/uL 11.03*   HEMOGLOBIN g/dL 14.0   HEMATOCRIT % 45.4   PLATELETS Thousands/uL 292   SEGS PCT % 76*   LYMPHO PCT % 11*   MONO PCT % 9   EOS PCT % 2     Results from last 7 days   Lab Units 09/20/24  0442 09/19/24  0402   SODIUM mmol/L 139 142   POTASSIUM mmol/L 3.7 3.8   CHLORIDE mmol/L 102 105   CO2 mmol/L 26 28   BUN mg/dL 39* 40*   CREATININE mg/dL 2.14* 2.30*   ANION GAP mmol/L 11 9   CALCIUM mg/dL 9.4 9.1   ALBUMIN g/dL  --  3.6   TOTAL BILIRUBIN mg/dL  --  1.78*   ALK PHOS U/L  --  69   ALT U/L  --  14   AST U/L  --  18   GLUCOSE RANDOM mg/dL 129 130     Results from last 7 days   Lab Units 09/18/24  0925   INR  1.72*     Results from last 7 days   Lab Units 09/20/24  1041 09/20/24  0833 09/19/24  1555 09/19/24  1147 09/19/24  0826 09/18/24  1718 09/18/24  0931   POC GLUCOSE mg/dl 282* 108 149* 158* 120 102 224*     Results from last 7 days   Lab Units 09/19/24  0402 09/18/24  0925   HEMOGLOBIN A1C % 7.3* 7.4*           Recent Cultures (last 7 days):         Imaging Review: Reviewed radiology reports from this admission including: MRI brain.  Other Studies: No additional pertinent studies reviewed.    Last 24 Hours Medication List:     Current Facility-Administered Medications:     acetaminophen (TYLENOL) tablet 650 mg, Q6H PRN    albuterol (PROVENTIL HFA,VENTOLIN HFA) inhaler 2 puff, Q6H PRN    allopurinol (ZYLOPRIM) tablet 50 mg, Every Other Day    aspirin chewable tablet 81 mg, Daily    atorvastatin (LIPITOR) tablet 40 mg, QPM    docusate sodium (COLACE) capsule 100 mg, BID    Fluticasone Furoate-Vilanterol 100-25 mcg/actuation 1 puff, Daily    furosemide (LASIX) injection 40 mg, BID (diuretic)    guaiFENesin (MUCINEX) 12 hr tablet 600 mg, Q12H SUSAN    insulin lispro  (HumALOG/ADMELOG) 100 units/mL subcutaneous injection 1-5 Units, TID AC **AND** Fingerstick Glucose (POCT), TID AC    metoprolol succinate (TOPROL-XL) 24 hr tablet 50 mg, Daily    pantoprazole (PROTONIX) EC tablet 40 mg, Daily    rivaroxaban (XARELTO) tablet 15 mg, Daily With Breakfast    sodium chloride (OCEAN) 0.65 % nasal spray 2 spray, Q1H PRN    spironolactone (ALDACTONE) tablet 25 mg, Daily    tamsulosin (FLOMAX) capsule 0.8 mg, Daily With Dinner    Administrative Statements   Today, Patient Was Seen By: Linda Moran MD  I have spent a total time of 39 minutes in caring for this patient on the day of the visit/encounter including .    **Please Note: This note may have been constructed using a voice recognition system.**

## 2024-09-20 NOTE — ASSESSMENT & PLAN NOTE
>>ASSESSMENT AND PLAN FOR ESSENTIAL HYPERTENSION WRITTEN ON 9/20/2024 11:17 AM BY HAYES DANGELO PA-C    Permissive hypertension allowed on admission. Now goal normotension.  Last /100.  Resumed on Toprol-XL 50 mg daily and added for Aldactone 25 mg daily.

## 2024-09-20 NOTE — ASSESSMENT & PLAN NOTE
Lab Results   Component Value Date    EGFR 29 09/20/2024    EGFR 26 09/19/2024    EGFR 24 09/18/2024    CREATININE 2.14 (H) 09/20/2024    CREATININE 2.30 (H) 09/19/2024    CREATININE 2.49 (H) 09/18/2024     Urinary retention protocol  Monitor BUN/creatinine while on IV diuretics  Hold losartan

## 2024-09-20 NOTE — PHYSICAL THERAPY NOTE
PHYSICAL THERAPY EVAL  Physical Therapy Evaluation    Performed at least 2 patient identifiers during session:  Patient Active Problem List   Diagnosis    Chronic obstructive pulmonary disease (COPD) ([facility])    Right sided weakness    Hypertensive emergency    Paroxysmal atrial fibrillation (HCC)    Type 2 diabetes mellitus, without long-term current use of insulin (HCC)    NSTEMI (non-ST elevated myocardial infarction) ([facility])    Essential hypertension    Acute HFrEF (heart failure with reduced ejection fraction) ([facility])    CKD (chronic kidney disease) stage 4, GFR 15-29 ml/min (HCC)    Obstructive sleep apnea    Chest pain    Hypoxia    Pacemaker    BMI 35.0-35.9,adult    Polyneuropathy associated with underlying disease ([facility])    Stage 4 chronic kidney disease due to type 2 diabetes mellitus ([facility])    Ascending aorta dilatation (HCC)    Obesity, morbid (HCC)    Secondary hyperparathyroidism (HCC)    COVID-19    Sore throat    Acute CVA (cerebrovascular accident) ([facility])    A-fib ([facility])    Non-ischemic myocardial injury (non-traumatic)    Lumbar radiculopathy       Past Medical History:   Diagnosis Date    A-fib ([facility])     Arthritis     CHF (congestive heart failure) (HCC)     Chronic kidney disease don't remember    COPD (chronic obstructive pulmonary disease) (HCC)     Diabetes mellitus (HCC)     Enlarged prostate     Gout     Heart murmur birth    Hyperlipidemia     Hypertension        Past Surgical History:   Procedure Laterality Date    A-V CARDIAC PACEMAKER INSERTION  08/2022    EAR SURGERY      EYE SURGERY  2021    cat. repaired    FRACTURE SURGERY              09/20/24 1120   PT Last Visit   PT Visit Date 09/20/24   Note Type   Note type Evaluation   Pain Assessment   Pain Assessment Tool 0-10   Pain Score No Pain   Restrictions/Precautions   Weight Bearing Precautions Per Order No   Other Precautions Fall Risk;Telemetry;Bed Alarm;Chair  "Alarm   Home Living   Type of Home House   Home Layout One level  (2STE)   Bathroom Shower/Tub Walk-in shower   Home Equipment Walker;Cane   Additional Comments Was not using an AD prior to admission   Prior Function   Level of [place] Independent with ADLs;Independent with functional mobility;Independent with IADLS   Lives With Spouse   IADLs Independent with driving;Independent with meal prep;Independent with medication management   Falls in the last 6 months 0   General   Additional Pertinent History MRI: Recent infarcts are seen in the deep white matter of the right frontal parietal junction and left frontal cortex and adjacent subcortical white matter. Bilateral involvement suggest embolic phenomenon.     Possible subacute infarct left occipital subcortical region   Family/Caregiver Present No   Cognition   Overall Cognitive Status WFL   Arousal/Participation Alert   Orientation Level Oriented X4   Memory Within functional limits   Following Commands Follows all commands and directions without difficulty   Subjective   Subjective \"I am ready to go home.\"   RLE Assessment   RLE Assessment WFL   LLE Assessment   LLE Assessment   (Hip flexion 4+/5. Hip adbuctor weakness visualized during ambulation)   Coordination   Movements are Fluid and Coordinated 1   Sensation WFL   Heel to Shin Intact   Light Touch   RLE Light Touch Grossly intact   LLE Light Touch Grossly intact   Bed Mobility   Additional Comments Received OOB in chair   Transfers   Sit to Stand 5  Supervision   Additional items Armrests   Stand to Sit 5  Supervision   Additional items Armrests   Ambulation/Elevation   Gait pattern   (trendeleburg gait due to L abductor weakness)   Gait Assistance 5  Supervision   Assistive Device Rolling walker   Distance 200ft   Balance   Static Sitting Normal   Dynamic Sitting Normal   Static Standing Good   Dynamic Standing Good   Ambulatory Fair +   Endurance Deficit   Endurance Deficit No   Activity Tolerance "   Activity Tolerance Patient tolerated treatment well   Medical Staff Made Aware Mylene TAN   Nurse Made Aware RN, Sonia   Assessment   Prognosis Good   Problem List Decreased strength;Impaired balance   Assessment Patient is a 72y/o M with acute CVA, acute heart failure, hypoxia, NSTEMI and lumbar radiculopathy. Patient resides with spouse in a home with steps to enter. He is independent at baseline without an AD. Current medical status includes fall risk, bed/chair alarm, telemetry, LLE weakness, decreased balance. Patient tolerated session well. No sensation or coordination deficits. Patient noted to have trendelenburg gait (weakness of the L hip abductor muscles). Supervision for transfers and ambulation. Relying on the RW- recommending use at all times. Patient has no further inpatient P.T. needs and would benefit from level 3 resources. Low intensity. The patient's AM-PAC Basic Mobility Inpatient Short Form Raw Score is 22. A Raw score of greater than 17 suggests the patient may benefit from discharge to home. Please also refer to the recommendation of the Physical Therapist for safe discharge planning.   Barriers to Discharge None   Goals   Patient Goals To go home   PT Treatment Day 0   Plan   Treatment/Interventions   (D/C P.T.)   Discharge Recommendation   Rehab Resource Intensity Level, PT III (Minimum Resource Intensity)   Additional Comments Owns a RW   AM-PAC Basic Mobility Inpatient   Turning in Flat Bed Without Bedrails 4   Lying on Back to Sitting on Edge of Flat Bed Without Bedrails 4   Moving Bed to Chair 4   Standing Up From Chair Using Arms 4   Walk in Room 3   Climb 3-5 Stairs With Railing 3   Basic Mobility Inpatient Raw Score 22   Basic Mobility Standardized Score 47.4   [facility] Highest Level Of Mobility   -HLM Goal 7: Walk 25 feet or more   -HLM Achieved 8: Walk 250 feet ot more   End of Consult   Patient Position at End of Consult Bedside chair;Bed/Chair alarm activated;All needs  within reach     Verena Sahu PT             Patient Name: Dangelo Cooper  Today's Date: 9/20/2024

## 2024-09-20 NOTE — UTILIZATION REVIEW
Initial Clinical Review    Admission: Date/Time/Statement:   Admission Orders (From admission, onward)       Ordered        09/18/24 1123  INPATIENT ADMISSION  Once                          Orders Placed This Encounter   Procedures    INPATIENT ADMISSION     Standing Status:   Standing     Number of Occurrences:   1     Order Specific Question:   Level of Care     Answer:   Med Surg [16]     Order Specific Question:   Estimated length of stay     Answer:   More than 2 Midnights     Order Specific Question:   Certification     Answer:   I certify that inpatient services are medically necessary for this patient for a duration of greater than two midnights. See H&P and MD Progress Notes for additional information about the patient's course of treatment.     ED Arrival Information       Expected   -    Arrival   9/18/2024 08:51    Acuity   Urgent              Means of arrival   Walk-In    Escorted by   Family Member    Service   Hospitalist    Admission type   Emergency              Arrival complaint   leg pain             Chief Complaint   Patient presents with    Extremity Weakness     Pt reports left leg weakness since yesterday. He notices pain in his lower back when he lifts left leg. No trauma        Initial Presentation: 73 y.o. male  to ED via walk in from home.    Admitted to inpatient with Dx: stroke like symptoms/atrial fib/acute heart failure/CKD/non ischemic myocardial injury/lumbar radiculopathy.  Presented to ED with left leg weakness noticing about 18 hours prior to arrival after being at [employer].   low back pain .  Last couple of days gained weight, bloating and short of breath and took extra Lasix.  PMHx:HFrEF, paroxysmal atrial fibrillation, hypertension, hyperlipidemia, status post pacemaker placement, NANCY on CPAP, prior history of TIA. On exam: decreased breath sounds.  Lumbar tenderness.   LLE edema.  Sensation decreased on LLE compared to right.  Weak left hip flexion, unable to do heel shin left  heel.   NIHSS 3.  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   D dimer 1.12.  INR 1.72 on Eliquis.  Hs tnl 67.  BNP 1532.  Bun 45. Creatinine 2.49.  glucose 237.   Imaging shows no DVT of LLE.  ED treatment: Brilinta load and IV lasix.  Placed on oxygen 2 liters.    Plan includes:  neuro checks.  MRI brain.  Echo.  Check lipid panel and A1c.  Hold home Eliquis, load with Brilinta and continue.  Permissive hypertension.  Hold home regimen of amlodipine, losartan, hydralazine, Toprol-XL and torsemide.  Start IV diuretics.  Fluid and salt restriction.  Consult Cardiology    Telemetry.  PT/OT.   Continue with home bronchodilator therapy.  Start SSI.      9/18/24 per neurology differential is potential SURINDER territory infarct vs lumbar spine disease. CT head and CTA H/N reviewed with radiology: no acute intracranial abnormality on CT head, CTA H/N with moderate to severe stenosis in the mid-basilar artery, right vertebral artery and left vertebral artery intracranially, no IR target. 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  Hold eliquis and check MRI brain.  Continue home asa and add Brilinta.  Permissive hypertension to 180 systolic.  Neuro checks.     9/18/24 per Cardiology - acute on chronic CHF with preserved EF/Permanent atrial fib/history of permanent pacer in 2020 for SSS and upgraded to BiV in 2022 after pacer induced cardiomyopathy and ED of 40%. Start IV lasix.  Check echo.   On Eliquis  Neurology working  up for left lower weakness.      Anticipated Length of Stay/Certification Statement:  Patient will be admitted on an inpatient basis with an anticipated length of stay of greater than 2 midnights secondary to strokelike symptoms, acute CHF.     Date:  9/19   Day 2:  feels steadier when uses walker.   LLE still weak but better than admission.  Overnight responded to IV lasix with -2.5L out.  Still with fluid overload.   On exam:  alert and oriented.  BLE edema.  Lungs crackles.     Finger-Nose-Finger Test normal. Heel-to-shin test: R normal, attempts to perform L but unable to due to weakness. Full strength throughout bilateral UE/LE except for:  L hip flexion 4/5.  Continue IV Diuresis.  neuro checks.   Continue aspirin 81 mg and Brilinta 90 mg BID.  Normotension.  Telemetry.   MRI brain this afternoon    Date: 9/20/2024  Day 3: Has surpassed a 2nd midnight with active treatments and services.    Neurology Note 9/20 - Noted some improvement in LLE, strength and mobility. Continues with LLE weakness. Examination otherwise stable compared to yesterday   MRI brain and L-spine obtained. MRI brain with several recent infarcts (R fronto-parietal junction, left frontal cortex and subcortical region) in addition to possible subacute infarct in the left occipital region. MRI L-spine without noted involvement of the conus or cauda equina but with moderate/severe bony/disc changes as reported. Can consider review of L-spine imaging with NSGY although may not be contributing to presentation. He has had some improvement and although strokes are small, the right frontal stroke could potentially have contributed to presentation.  Etiology appears embolic. No significant anterior circulation vessel disease. Would consider Eliquis failure (pt reported compliance at home) and would switch anticoagulation to an alternate anticoagulant such as Pradaxa or Warfarin, choice per primary based on what is best with his renal function. Can  restart anticoagulation tonight. Can discontinue Brilinta and continue home aspirin. BP goal normotension. If LLE strength does not continue to improve despite some more time and therapy, can consider outpatient EMG for further localization.     Caddejon note - 9/20 - Initially with volume overload with orthopnea and worsening LE edma. Ran out of torsemide. It was refilled and he took x 1 week and hd progressive symptoms since then. Diuressing well with lasix IV bid, Net -4.6L since admit -2L overnight. He switched to recliner overnight due to orthopnea, Add aldactone. Given worsning EF. Hold ACE/ARB/ARNI in the setting of CKD.     ED Triage Vitals   Temperature Pulse Respirations Blood Pressure SpO2 Pain Score   09/18/24 1015 09/18/24 0858 09/18/24 0857 09/18/24 0858 09/18/24 0858 09/18/24 1515   98.4 °F (36.9 °C) 75 18 137/91 91 % No Pain     Weight (last 2 days)       Date/Time Weight    09/20/24 07:17:48 98.7 (217.5)     Weight: Standing scale, Dr. webster per rounding at 09/20/24 0717    09/19/24 0422 101 (222.89)     Weight: Weighed patient twice. Patient being diuresed with IV lasix, so weight loss is expected at 09/19/24 0422    09/18/24 1513 105 (231.48)    09/18/24 1319 105 (230.4)    09/18/24 0926 105 (231)            Vital Signs (last 3 days)       Date/Time Temp Pulse Resp BP MAP (mmHg) SpO2 Calculated FIO2 (%) - Nasal Cannula O2 Flow Rate (L/min) Nasal Cannula O2 Flow Rate (L/min) O2 Device O2 Interface Device Patient Position - Orthostatic VS [place] Coma Scale Score Pain    09/20/24 1200 -- -- -- -- -- -- -- -- -- -- -- -- 15 --    09/20/24 1121 -- -- -- -- -- -- -- -- -- -- -- -- -- No Pain    09/20/24 1120 -- -- -- -- -- -- -- -- -- -- -- -- -- No Pain    09/20/24 0900 -- 85 -- -- -- 97 % -- -- -- -- -- -- -- --    09/20/24 0800 -- -- -- -- -- -- -- -- -- None (Room air) -- -- 15 No Pain    09/20/24 07:17:48 97.4 °F (36.3 °C) 79 18 138/100 113 95 % -- -- -- None (Room air) -- Sitting -- --    09/20/24  04:04:50 97.6 °F (36.4 °C) 77 -- 150/103 119 96 % -- -- -- -- -- -- -- --    09/20/24 0400 -- -- -- -- -- -- -- -- -- -- -- -- 15 --    09/20/24 0239 -- -- -- -- -- -- -- -- -- None (Room air) -- -- -- --    09/20/24 00:00:40 97.4 °F (36.3 °C) 70 20 147/100 116 93 % -- -- -- CPAP -- Lying 15 --    09/19/24 2306 -- -- -- -- -- -- -- -- -- -- Face mask -- -- --    09/19/24 2141 -- -- -- -- -- -- 28 2 L/min 2 L/min CPAP Face mask -- -- --    09/19/24 20:00:24 97.9 °F (36.6 °C) 67 -- 143/96 112 96 % -- -- -- None (Room air) -- -- 15 No Pain    09/19/24 19:47:35 97.7 °F (36.5 °C) 84 18 138/96 110 96 % -- -- -- -- -- Lying -- --    09/19/24 1557 97.4 °F (36.3 °C) 82 20 137/94 108 95 % -- -- -- None (Room air) -- Sitting -- --    09/19/24 1424 -- 100 -- -- -- 94 % -- 2 L/min -- Nasal cannula -- -- -- --    09/19/24 1403 -- 100 -- -- -- 95 % 28 -- 2 L/min Nasal cannula -- -- -- --    09/19/24 1342 -- 100 -- -- -- 96 % 28 -- 2 L/min Nasal cannula -- -- -- --    09/19/24 1328 -- 100 18 -- -- 95 % 28 -- 2 L/min Nasal cannula -- -- -- --    09/19/24 1116 -- -- -- -- -- -- -- -- -- -- -- -- -- No Pain    09/19/24 09:02:32 97.7 °F (36.5 °C) 76 20 140/92 108 96 % -- -- -- None (Room air) -- Sitting -- --    09/19/24 05:59:39 -- 69 -- 132/91 105 98 % -- -- -- -- -- -- 15 --    09/19/24 0254 -- -- -- -- -- -- -- 3 L/min -- CPAP Face mask -- -- --    09/19/24 02:05:01 97.8 °F (36.6 °C) 69 16 124/88 100 99 % -- -- -- CPAP -- Lying 15 --    09/19/24 00:01:33 -- 66 -- 129/80 96 98 % -- -- -- -- -- -- 15 --    09/18/24 2228 -- -- -- -- -- -- -- 3 L/min -- CPAP Face mask -- -- --    09/18/24 22:02:09 98 °F (36.7 °C) 79 -- 138/90 106 92 % -- -- -- -- -- -- 15 --    09/18/24 19:55:53 97.8 °F (36.6 °C) 83 18 136/95 109 93 % -- -- -- None (Room air) -- Sitting 15 No Pain    09/18/24 18:17:02 -- 81 18 163/103 123 93 % -- -- -- None (Room air) -- Sitting -- --    09/18/24 1749 -- 78 20 -- -- 92 % -- -- -- None (Room air) -- -- -- --    09/18/24  16:50:29 98.2 °F (36.8 °C) 81 20 146/110 122 98 % -- -- -- -- -- -- -- --    09/18/24 1515 -- -- -- -- -- -- -- -- -- -- -- -- -- No Pain    09/18/24 1513 -- 76 -- 148/92 -- 98 % -- -- -- -- -- -- -- --    09/18/24 12:10:29 -- 81 20 148/92 111 98 % -- -- -- -- -- -- -- --    09/18/24 1045 -- 69 20 143/97 116 93 % -- -- -- -- -- -- 15 --    09/18/24 1030 -- 71 20 131/97 -- 95 % -- -- -- -- -- -- 15 --    09/18/24 1015 98.4 °F (36.9 °C) 69 20 138/97 -- 94 % -- -- -- -- -- -- 15 --    09/18/24 1000 -- 71 22 143/98 -- 93 % -- -- -- -- -- -- 15 --    09/18/24 0954 -- 69 18 129/92 -- 95 % -- -- -- Nasal cannula -- -- 15 --    09/18/24 0939 -- 78 18 139/88 -- 91 % -- -- -- None (Room air) -- -- 15 --    09/18/24 0858 -- 75 -- 137/91 -- 91 % -- -- -- None (Room air) -- Lying -- --    09/18/24 0857 -- -- 18 -- -- -- -- -- -- -- -- -- -- --             09/17 0701 09/18 0700 09/18 0701 09/19 0700 09/19 0701 09/20 0700   P.O.  1840    Total Intake(mL/kg)  1840 (18.2)    Urine (mL/kg/hr)  4527 1200 (1.6)   Stool  0    Total Output  4527 1200   Net  -2687 -1200         Unmeasured Stool Occurrence  1 x      Pertinent Labs/Diagnostic Test Results:   Radiology:  VAS VENOUS DUPLEX -LOWER LIMB UNILATERAL   Final Interpretation by Boom Fallon MD (09/18 1617)   RIGHT LOWER LIMB LIMITED:  Evaluation shows no evidence of thrombus in the common femoral vein.  Doppler evaluation shows a normal response to augmentation maneuvers.     LEFT LOWER LIMB:  No evidence of acute or chronic deep vein thrombosis  No evidence of superficial thrombophlebitis noted.  Doppler evaluation shows a normal response to augmentation maneuvers.  Popliteal, posterior tibial and anterior tibial arterial Doppler waveform's are  triphasic.   CT spine lumbar without contrast   Final Interpretation by Lasha Valadez DO (09/18 1013)      Diffuse lumbar spondylitic degenerative change with annular bulging, osteophyte formation and annular  calcifications. Multilevel moderate canal stenosis and foraminal narrowing from L2-3 through L5-S1.      At L5-S1 foraminal narrowing is more severe. At L3-4 and L4-5 there is moderate bilateral foraminal narrowing including mass effect upon the exiting nerves, worst at the L3-4 level.      Workstation performed: [id number]         CT stroke alert brain   Final Interpretation by Lasha Valadez DO (09/18 1008)      Stable chronic microangiopathic change with no mass, hemorrhage or definitive signs of acute territorial infarction.      Findings were directly discussed with Steven Mckeon at approximately 10:00 a.m.      Workstation performed: [id number]         CTA stroke alert (head/neck)   Final Interpretation by Lasha Valadez DO (09/18 1006)      Proximal occlusion of the left vertebral artery with diffuse hypoplasia. The V4 segment on the left also demonstrates either high-grade stenosis or short segment occlusion of the hypoplastic intracranial portion of the vessel.      The right vertebral artery demonstrates a moderate to high-grade origin stenosis and moderate stenosis intracranially.      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         Findings were directly discussed with Steven Mckeon at approximately 10:00 a.m.      Workstation performed: [id number]         XR chest portable   Final Interpretation by Jose Miguel Fernandez MD (09/18 0935)      Cardiomegaly with pulmonary vascular congestion.      Questionable small area of left lung infiltrate and left pleural effusion. Recommend continued follow-up.                  Workstation performed: [id number]         MRI lumbar spine wo contrast   9/19  Multilevel lumbar spondylosis, as described above, contributing to at most severe canal stenosis at L3-L4, and multilevel neural foraminal stenosis, worst on the left at L3-L4 on the right at L4-S1.                   MRI brain wo contrast   9/19  ecent infarcts are seen in the  deep white matter of the right frontal parietal junction and left frontal cortex and adjacent subcortical white matter. Bilateral involvement suggest embolic phenomenon.     Possible subacute infarct left occipital subcortical region.     Moderate to advanced chronic microangiopathic changes.          Cardiology:  Echo complete w/ contrast if indicated   Final Result by Angel Alas MD (09/18 1097)        Left Ventricle: Left ventricular cavity size is moderately dilated.    Wall thickness is mildly increased. The left ventricular ejection fraction    is 30%. Systolic function is severely reduced. There is severe global    hypokinesis with regional variation. Abnormal diastolic inflow patterns    due to atrial fibrillation. Left atrial filling pressure is elevated.     Right Ventricle: Right ventricular cavity size is dilated. Systolic    function is moderately reduced.     Left Atrium: The atrium is severely dilated.     Right Atrium: The atrium is severely dilated.     Aortic Valve: The aortic valve is trileaflet. The leaflets are severely    calcified. There is severely reduced mobility. There appears to be    moderate to severe stenosis, especially visually. The aortic valve mean    gradient is 12.0 mmHg. The aortic valve velocity is increased due to    stenosis but lower than expected due to the presence of decreased flow.     Aortic valve area could be underestimated.     Mitral Valve: There is mild annular calcification. There is moderate to    severe regurgitation.     Tricuspid Valve: There is moderate regurgitation.     Aorta: The aortic root is normal in size. The ascending aorta is mildly    dilated.     Pericardium: There is a small pericardial effusion posterior to the    heart. There is no echocardiographic evidence of tamponade.     Pulmonary Artery: The pulmonary artery systolic pressure is severely    increased.         ECG 12 lead   Final Result by Angel Alas MD (09/20 5019)    Ventricular-paced rhythm   Abnormal ECG   When compared with ECG of 18-SEP-2024 09:26, (unconfirmed)   No significant change was found   Confirmed by Angel Alas ([id number]) on 9/20/2024 8:05:08 AM      ECG 12 lead   Final Result by Angel Alas MD (09/20 0805)   Electronic ventricular pacemaker with occasional Premature ventricular    complexes   Abnormal ECG   When compared with ECG of 02-NOV-2021 04:35,   No significant change was found   Confirmed by Angel Alas ([id number]) on 9/20/2024 8:05:31 AM        Date/Time: 9/18/2024 12:35 PM  Indications / Diagnosis:  Sob  ECG reviewed by me, the ED Provider: yes    Patient location:  ED  Previous ECG:     Previous ECG:  Compared to current    Comparison ECG info:  11-21    Similarity:  No change  Interpretation:     Interpretation: abnormal    Rate:     ECG rate:  70    ECG rate assessment: normal    Rhythm:     Rhythm: paced    Pacing:     Capture:  Complete    Type of pacing:  Ventricular  Ectopy:     Ectopy: none    ST segments:     ST segments:  Normal  T waves:     T waves: normal         Results from last 7 days   Lab Units 09/20/24  0442 09/19/24  0402 09/18/24  0925   WBC Thousand/uL 11.03* 9.38 8.25   HEMOGLOBIN g/dL 14.0 13.0 13.2   HEMATOCRIT % 45.4 40.8 42.0   PLATELETS Thousands/uL 292 250 229   TOTAL NEUT ABS Thousands/µL 8.42*  --   --      Results from last 7 days   Lab Units 09/20/24 0442 09/19/24  0402 09/18/24  0925   SODIUM mmol/L 139 142 143   POTASSIUM mmol/L 3.7 3.8 4.0   CHLORIDE mmol/L 102 105 104   CO2 mmol/L 26 28 30   ANION GAP mmol/L 11 9 9   BUN mg/dL 39* 40* 45*   CREATININE mg/dL 2.14* 2.30* 2.49*   EGFR ml/min/1.73sq m 29 26 24   CALCIUM mg/dL 9.4 9.1 9.2   MAGNESIUM mg/dL 2.4 2.5  --      Results from last 7 days   Lab Units 09/19/24  0402 09/18/24  0925   AST U/L 18 20   ALT U/L 14 16   ALK PHOS U/L 69 72   TOTAL PROTEIN g/dL 6.8 7.2   ALBUMIN g/dL 3.6 3.7   TOTAL BILIRUBIN mg/dL 1.78* 1.21*     Results from last 7 days   Lab  Units 09/20/24  1041 09/20/24  0833 09/19/24  1555 09/19/24  1147 09/19/24  0826 09/18/24  1718 09/18/24  0931   POC GLUCOSE mg/dl 282* 108 149* 158* 120 102 224*     Results from last 7 days   Lab Units 09/20/24  0442 09/19/24  0402 09/18/24  0925   GLUCOSE RANDOM mg/dL 129 130 237*     Results from last 7 days   Lab Units 09/19/24  0402 09/18/24  0925   HEMOGLOBIN A1C % 7.3* 7.4*   EAG mg/dl 163 166     Results from last 7 days   Lab Units 09/18/24  1144 09/18/24  0925   HS TNI 0HR ng/L  --  67*   HS TNI 2HR ng/L 66*  --    HSTNI D2 ng/L -1  --      Results from last 7 days   Lab Units 09/18/24  0925   D-DIMER QUANTITATIVE ug/ml FEU 1.12*     Results from last 7 days   Lab Units 09/18/24  0925   PROTIME seconds 20.6*   INR  1.72*   PTT seconds 36*     Results from last 7 days   Lab Units 09/18/24  0925   TSH 3RD GENERATON uIU/mL 0.736     Results from last 7 days   Lab Units 09/18/24  0925   BNP pg/mL 1,532*     ED Treatment-Medication Administration from 09/18/2024 0851 to 09/18/2024 1200         Date/Time Order Dose Route Action     09/18/2024 1105 ticagrelor (BRILINTA) tablet 180 mg 180 mg Oral Given     09/18/2024 1106 furosemide (LASIX) injection 20 mg 20 mg Intravenous Given            Past Medical History:   Diagnosis Date    A-fib ([facility])     Arthritis     CHF (congestive heart failure) ([facility])     Chronic kidney disease don't remember    COPD (chronic obstructive pulmonary disease) ([facility])     Diabetes mellitus ([facility])     Enlarged prostate     Gout     Heart murmur birth    Hyperlipidemia     Hypertension      Present on Admission:   Acute CVA (cerebrovascular accident) ([facility])   Chronic obstructive pulmonary disease (COPD) ([facility])   Type 2 diabetes mellitus, without long-term current use of insulin ([facility])   Essential hypertension   CKD (chronic kidney disease) stage 4, GFR 15-29 ml/min ([facility])   Obstructive sleep apnea   Hypoxia   Pacemaker   Acute HFrEF (heart failure with reduced ejection fraction) ([facility])    Non-ischemic myocardial injury (non-traumatic)   Lumbar radiculopathy      Admitting Diagnosis: Left leg weakness [R29.898]  Lumbar herniated disc [M51.26]  CHF, acute (HCC) [I50.9]  Weakness of extremity [R29.898]  Age/Sex: 73 y.o. male  Admission Orders:  Scheduled Medications:  allopurinol, 50 mg, Oral, Every Other Day  aspirin, 81 mg, Oral, Daily  atorvastatin, 40 mg, Oral, QPM  docusate sodium, 100 mg, Oral, BID  Fluticasone Furoate-Vilanterol, 1 puff, Inhalation, Daily  furosemide, 40 mg, Intravenous, BID (diuretic)  insulin lispro, 1-5 Units, Subcutaneous, TID AC  pantoprazole, 40 mg, Oral, Daily  tamsulosin, 0.8 mg, Oral, Daily With Dinner  ticagrelor, 90 mg, Oral, Q12H SUSAN- d/c'd 9/20  Xarelto 15mg po qd - started 9/20  Aldactone 25mg po qd - started 9/20      Continuous IV Infusions:     PRN Meds:  acetaminophen, 650 mg, Oral, Q6H PRN  albuterol, 2 puff, Inhalation, Q6H PRN  sodium chloride, 2 spray, Each Nare, Q1H PRN    Telemetry   Neuro checks -  Every 1 hour x 4 hours, then every 2 hours x 8 hours, then every 4 hours x 72 hours   Fluid restriction  Cpap at bedtime   PT/OT/Speech    IP CONSULT TO NEUROLOGY  IP CONSULT TO CARDIOLOGY    Network Utilization Review Department  ATTENTION: Please call with any questions or concerns to [phone] and carefully listen to the prompts so that you are directed to the right person. All voicemails are confidential.   For Discharge needs, contact Care Management DC Support Team at [phone] opt. 2  Send all requests for admission clinical reviews, approved or denied determinations and any other requests to dedicated fax number below belonging to the campus where the patient is receiving treatment. List of dedicated fax numbers for the Facilities:  FACILITY NAME UR FAX NUMBER   ADMISSION DENIALS (Administrative/Medical Necessity) [phone]   DISCHARGE SUPPORT TEAM (NETWORK) [phone]   PARENT CHILD HEALTH (Maternity/NICU/Pediatrics) [phone]   [facility]  [facility] [phone]   [facility] [phone]   [facility] [phone]   [facility] [phone]   [facility] [phone]   [facility] [phone]   [facility] [phone]   [facility] [phone]   [facility] [phone]   [facility] [phone]   [facility] [phone]   [facility] [phone]

## 2024-09-20 NOTE — PROGRESS NOTES
Brief Neurology Progress Note    Pt seen at bedside, reviewed MRI findings with pt. In regard to his LLE weakness, he feels that his strength is close to baseline in his LLE today. He has at least 4+/5 strength today in the LLE at the hip and otherwise has 5/5 strength throughout the rest of his LLE. Able to perform HTS with LLE albeit slowly without obvious ataxia. Examination otherwise stable compared to prior. From a neurologic perspective, he reports feeling close to baseline and is getting around better. With noted gradual improvement in isolated hip weakness since admission which was sudden in onset, now close to baseline examination, and otherwise with chronic sensory changes related to a prior LLE injury that are unchanged from baseline, more likely that stroke in R frontal lobe was etiology for his presentation rather than lumbar spine disease. Being optimized from a cardiac perspective with Cardiology on board. With embolic appearing stroke in multiple vascular territories, suspect proximal embolic source not entirely related to known vessel disease. Would consider Eliquis failure. Would switch anticoagulation to an alternate anticoagulant such as Pradaxa or Warfarin, choice per primary based on what is best with his renal function. Okay to restart anticoagulation from a neurologic perspective. Continue home aspirin. BP goal normotension. Therapy when able. Continue telemetry. Rest of care per primary.     Niteshjoe ILA Cooper will need follow-up in in 6 weeks with neurovascular team for Other (embolic stroke) in 60 minute appointment. They will not require outpatient neurological testing at this time.

## 2024-09-20 NOTE — CASE MANAGEMENT
Case Management Discharge Planning Note    Patient name Dangelo Cooper  Location /-01 MRN [id number]  : 1950 Date 2024       Current Admission Date: 2024  Current Admission Diagnosis:Acute CVA (cerebrovascular accident) (Formerly [facility])   Patient Active Problem List    Diagnosis Date Noted Date Diagnosed    Acute CVA (cerebrovascular accident) (Formerly [facility]) 2024     A-fib ([facility]) 2024     Non-ischemic myocardial injury (non-traumatic) 2024     Lumbar radiculopathy 2024     Sore throat 09/10/2024     COVID-19 2024     Polyneuropathy associated with underlying disease (Formerly [facility]) 2022     Stage 4 chronic kidney disease due to type 2 diabetes mellitus (Formerly [facility]) 2022     Ascending aorta dilatation (Formerly [facility]) 2022     Obesity, morbid ([facility]ly [facility]) 2022     Secondary hyperparathyroidism ([facility]ly [facility]) 2022     Pacemaker 2021     BMI 35.0-35.9,adult 2021     Chest pain 10/31/2021     Hypoxia 10/31/2021     Essential hypertension 2021     Acute HFrEF (heart failure with reduced ejection fraction) (Formerly [facility]) 2021     CKD (chronic kidney disease) stage 4, GFR 15-29 ml/min ([facility]) 2021     Obstructive sleep apnea 2021     NSTEMI (non-ST elevated myocardial infarction) (Formerly [facility]) 2017     Chronic obstructive pulmonary disease (COPD) (Formerly [facility]) 2017     Right sided weakness 2017     Hypertensive emergency 2017     Paroxysmal atrial fibrillation ([facility]) 2017     Type 2 diabetes mellitus, without long-term current use of insulin (Formerly [facility]) 2017       LOS (days): 2  Geometric Mean LOS (GMLOS) (days): 3.9  Days to GMLOS:1.9     OBJECTIVE:  Risk of Unplanned Readmission Score: 15.8         Current admission status: Inpatient   Preferred Pharmacy:   [facility] [address] [place] [address] - [address] [facility]  [address] [facility] [address]  Phone: [phone] Fax: [phone]    Primary Care Provider: Ada Benites,  DO    Primary Insurance: [employer]  REP  Secondary Insurance:     DISCHARGE DETAILS:                                          Other Referral/Resources/Interventions Provided:  Interventions: Prescription Price Check  Referral Comments: CM completed price check on pt's Xarelto. Per [facility] Pharmacist, pt's Xarelto would need prior auth, is not covered by his insurance, and would cost $683.50 out of pocket. [facility], [facility] with [facility] Desmond that pt is service connected, and [facility] would [facility] the [facility]. [facility] rep explained that script would need to be sent to [facility] PCP, Dr. Carmen in [place], via fax--[phone]--in order for Dr. Carmen to sign off on med. From there, Dr. Carm[facility] would [facility], where Xarelto would be processed for delivery. The process would take 10-days, per Desmond the [facility] rep. CM informed Dr. Dean SOLO. Dr. Moran agreed to write paper scrip to [facility]. CM to provide pt with 30-day free coupon for Xarelto so that pt could take med while Xarelto through [facility] is being processed for delivery.

## 2024-09-20 NOTE — PLAN OF CARE
Problem: Neurological Deficit  Goal: Neurological status is stable or improving  Description: Interventions:  - Monitor and assess patient's level of consciousness, motor function, sensory function, and level of assistance needed for ADLs.   - Monitor and report changes from baseline. Collaborate with interdisciplinary team to initiate plan and implement interventions as ordered.   - Provide and maintain a safe environment.  - Consider seizure precautions.  - Consider fall precautions.  - Consider aspiration precautions.  - Consider bleeding precautions.  Outcome: Progressing     Problem: Activity Intolerance/Impaired Mobility  Goal: Mobility/activity is maintained at optimum level for patient  Description: Interventions:  - Assess and monitor patient  barriers to mobility and need for assistive/adaptive devices.  - Assess patient's emotional response to limitations.  - Collaborate with interdisciplinary team and initiate plans and interventions as ordered.  - Encourage independent activity per ability.  - Maintain proper body alignment.  - Perform active/passive rom as tolerated/ordered.  - Plan activities to conserve energy.  - Turn patient as appropriate  Outcome: Progressing     Problem: Communication Impairment  Goal: Ability to express needs and understand communication  Description: Assess patient's communication skills and ability to understand information.  Patient will demonstrate use of effective communication techniques, alternative methods of communication and understanding even if not able to speak.     - Encourage communication and provide alternate methods of communication as needed.  - Collaborate with case management/ for discharge needs.  - Include patient/family/caregiver in decisions related to communication.  Outcome: Progressing     Problem: Potential for Aspiration  Goal: Non-ventilated patient's risk of aspiration is minimized  Description: Assess and monitor vital signs,  respiratory status, and labs (WBC).  Monitor for signs of aspiration (tachypnea, cough, rales, wheezing, cyanosis, fever).    - Assess and monitor patient's ability to swallow.  - Place patient up in chair to eat if possible.  - HOB up at 90 degrees to eat if unable to get patient up into chair.  - Supervise patient during oral intake.   - Instruct patient/ family to take small bites.  - Instruct patient/ family to take small single sips when taking liquids.  - Follow patient-specific strategies generated by speech pathologist.  Outcome: Progressing  Goal: Ventilated patient's risk of aspiration is minimized  Description: Assess and monitor vital signs, respiratory status, airway cuff pressure, and labs (WBC).  Monitor for signs of aspiration (tachypnea, cough, rales, wheezing, cyanosis, fever).  Outcome: Progressing     Problem: Nutrition  Goal: Nutrition/Hydration status is improving  Description: Monitor and assess patient's nutrition/hydration status for malnutrition (ex- brittle hair, bruises, dry skin, pale skin and conjunctiva, muscle wasting, smooth red tongue, and disorientation). Collaborate with interdisciplinary team and initiate plan and interventions as ordered.  Monitor patient's weight and dietary intake as ordered or per policy. Utilize nutrition screening tool and intervene per policy. Determine patient's food preferences and provide high-protein, high-caloric foods as appropriate.     - Assist patient with eating.  - Allow adequate time for meals.  - Encourage patient to take dietary supplement as ordered.  - Collaborate with clinical nutritionist.  - Include patient/family/caregiver in decisions related to nutrition.  Outcome: Progressing     Problem: Prexisting or High Potential for Compromised Skin Integrity  Goal: Skin integrity is maintained or improved  Description: INTERVENTIONS:  - Identify patients at risk for skin breakdown  - Assess and monitor skin integrity  - Assess and monitor  nutrition and hydration status  - Monitor labs   - Assess for incontinence   - Turn and reposition patient  - Assist with mobility/ambulation  - Relieve pressure over bony prominences  - Avoid friction and shearing  - Provide appropriate hygiene as needed including keeping skin clean and dry  - Evaluate need for skin moisturizer/barrier cream  - Collaborate with interdisciplinary team   - Patient/family teaching  - Consider wound care consult   Outcome: Progressing     Problem: CARDIOVASCULAR - ADULT  Goal: Maintains optimal cardiac output and hemodynamic stability  Description: INTERVENTIONS:  - Monitor I/O, vital signs and rhythm  - Monitor for S/S and trends of decreased cardiac output  - Administer and titrate ordered vasoactive medications to optimize hemodynamic stability  - Assess quality of pulses, skin color and temperature  - Assess for signs of decreased coronary artery perfusion  - Instruct patient to report change in severity of symptoms  Outcome: Progressing  Goal: Absence of cardiac dysrhythmias or at baseline rhythm  Description: INTERVENTIONS:  - Continuous cardiac monitoring, vital signs, obtain 12 lead EKG if ordered  - Administer antiarrhythmic and heart rate control medications as ordered  - Monitor electrolytes and administer replacement therapy as ordered  Outcome: Progressing     Problem: METABOLIC, FLUID AND ELECTROLYTES - ADULT  Goal: Electrolytes maintained within normal limits  Description: INTERVENTIONS:  - Monitor labs and assess patient for signs and symptoms of electrolyte imbalances  - Administer electrolyte replacement as ordered  - Monitor response to electrolyte replacements, including repeat lab results as appropriate  - Instruct patient on fluid and nutrition as appropriate  Outcome: Progressing  Goal: Fluid balance maintained  Description: INTERVENTIONS:  - Monitor labs   - Monitor I/O and WT  - Instruct patient on fluid and nutrition as appropriate  - Assess for signs &  symptoms of volume excess or deficit  Outcome: Progressing  Goal: Glucose maintained within target range  Description: INTERVENTIONS:  - Monitor Blood Glucose as ordered  - Assess for signs and symptoms of hyperglycemia and hypoglycemia  - Administer ordered medications to maintain glucose within target range  - Assess nutritional intake and initiate nutrition service referral as needed  Outcome: Progressing     Problem: MUSCULOSKELETAL - ADULT  Goal: Maintain or return mobility to safest level of function  Description: INTERVENTIONS:  - Assess patient's ability to carry out ADLs; assess patient's baseline for ADL function and identify physical deficits which impact ability to perform ADLs (bathing, care of mouth/teeth, toileting, grooming, dressing, etc.)  - Assess/evaluate cause of self-care deficits   - Assess range of motion  - Assess patient's mobility  - Assess patient's need for assistive devices and provide as appropriate  - Encourage maximum independence but intervene and supervise when necessary  - Involve family in performance of ADLs  - Assess for home care needs following discharge   - Consider OT consult to assist with ADL evaluation and planning for discharge  - Provide patient education as appropriate  Outcome: Progressing  Goal: Maintain proper alignment of affected body part  Description: INTERVENTIONS:  - Support, maintain and protect limb and body alignment  - Provide patient/ family with appropriate education  Outcome: Progressing     Problem: SAFETY ADULT  Goal: Patient will remain free of falls  Description: INTERVENTIONS:  - Educate patient/family on patient safety including physical limitations  - Instruct patient to call for assistance with activity   - Consult OT/PT to assist with strengthening/mobility   - Keep Call bell within reach  - Keep bed low and locked with side rails adjusted as appropriate  - Keep care items and personal belongings within reach  - Initiate and maintain comfort  rounds  - Make Fall Risk Sign visible to staff  - Offer Toileting every 2 Hours, in advance of need  - Initiate/Maintain bed/chair alarm  - Obtain necessary fall risk management equipment: yellow bracelet, yellow socks, bed/chair alarm  - Apply yellow socks and bracelet for high fall risk patients  - Consider moving patient to room near nurses station  Outcome: Progressing  Goal: Maintain or return to baseline ADL function  Description: INTERVENTIONS:  -  Assess patient's ability to carry out ADLs; assess patient's baseline for ADL function and identify physical deficits which impact ability to perform ADLs (bathing, care of mouth/teeth, toileting, grooming, dressing, etc.)  - Assess/evaluate cause of self-care deficits   - Assess range of motion  - Assess patient's mobility; develop plan if impaired  - Assess patient's need for assistive devices and provide as appropriate  - Encourage maximum independence but intervene and supervise when necessary  - Involve family in performance of ADLs  - Assess for home care needs following discharge   - Consider OT consult to assist with ADL evaluation and planning for discharge  - Provide patient education as appropriate  Outcome: Progressing  Goal: Maintains/Returns to pre admission functional level  Description: INTERVENTIONS:  - Perform AM-PAC 6 Click Basic Mobility/ Daily Activity assessment daily.  - Set and communicate daily mobility goal to care team and patient/family/caregiver.   - Collaborate with rehabilitation services on mobility goals if consulted  - Perform Range of Motion 5 times a day.  - Reposition patient every 2 hours.  - Dangle patient 3 times a day  - Stand patient 5 times a day  - Ambulate patient 3 times a day  - Out of bed to chair 5 times a day   - Out of bed for meals 3 times a day  - Out of bed for toileting  - Record patient progress and toleration of activity level   Outcome: Progressing     Problem: DISCHARGE PLANNING  Goal: Discharge to home or  other facility with appropriate resources  Description: INTERVENTIONS:  - Identify barriers to discharge w/patient and caregiver  - Arrange for needed discharge resources and transportation as appropriate  - Identify discharge learning needs (meds, wound care, etc.)  - Arrange for interpretive services to assist at discharge as needed  - Refer to Case Management Department for coordinating discharge planning if the patient needs post-hospital services based on physician/advanced practitioner order or complex needs related to functional status, cognitive ability, or social support system  Outcome: Progressing     Problem: Knowledge Deficit  Goal: Patient/family/caregiver demonstrates understanding of disease process, treatment plan, medications, and discharge instructions  Description: Complete learning assessment and assess knowledge base.  Interventions:  - Provide teaching at level of understanding  - Provide teaching via preferred learning methods  Outcome: Progressing     Problem: Nutrition/Hydration-ADULT  Goal: Nutrient/Hydration intake appropriate for improving, restoring or maintaining nutritional needs  Description: Monitor and assess patient's nutrition/hydration status for malnutrition. Collaborate with interdisciplinary team and initiate plan and interventions as ordered.  Monitor patient's weight and dietary intake as ordered or per policy. Utilize nutrition screening tool and intervene as necessary. Determine patient's food preferences and provide high-protein, high-caloric foods as appropriate.     INTERVENTIONS:  - Monitor oral intake, urinary output, labs, and treatment plans  - Assess nutrition and hydration status and recommend course of action  - Evaluate amount of meals eaten  - Assist patient with eating if necessary   - Allow adequate time for meals  - Recommend/ encourage appropriate diets, oral nutritional supplements, and vitamin/mineral supplements  - Order, calculate, and assess calorie  counts as needed  - Recommend, monitor, and adjust tube feedings and TPN/PPN based on assessed needs  - Assess need for intravenous fluids  - Provide specific nutrition/hydration education as appropriate  - Include patient/family/caregiver in decisions related to nutrition  Outcome: Progressing

## 2024-09-20 NOTE — PROGRESS NOTES
Progress Note - Cardiology   Name: Dangelo Cooper 73 y.o. male I MRN: [id number]  Unit/Bed#: -01 I Date of Admission: 9/18/2024   Date of Service: 9/20/2024 I Hospital Day: 2     Assessment & Plan  Acute CVA (cerebrovascular accident) (Formerly [facility])  Presented with left leg weakness and gait instability.  MRI brain shows recent infarcts within the deep white matter of the right frontal parietal junction and left frontal cortex and adjacent subcortical white matter.  Bilateral involvement suggesting embolic phenomena.  Possible subacute infarct left occipital subcortical region.  Moderate to advanced chronic microangiopathic changes.  Patient was on ASA, statin and Eliquis prior to this admission. Reports compliance of the same. We should assume Eliquis failure at this point and recommend switching him to an alternative agent, likely Xarelto 15mg QD or Pradaxa 75mg BID (CrCl=42mL/min). Per neurology, plan to continue dual therapy by discontinuing Brilinta and continuing ASA. Continue statin.  Chronic obstructive pulmonary disease (COPD) (Formerly [facility])  Controlled.  Does not require O2 at home.  Type 2 diabetes mellitus, without long-term current use of insulin ([facility])  Lab Results   Component Value Date    HGBA1C 7.3 (H) 09/19/2024   On insulin sliding scale as inpatient.     Recent Labs     09/19/24  1147 09/19/24  1555 09/20/24  0833 09/20/24  1041   POCGLU 158* 149* 108 282*       Blood Sugar Average: Last 72 hrs:  (P) [id number]    Essential hypertension  Permissive hypertension allowed on admission. Now goal normotension.  Last /100.  Resumed on Toprol-XL 50 mg daily and added for Aldactone 25 mg daily.  Acute HFrEF (heart failure with reduced ejection fraction) (Former[facility])  Wt Readings from Last 3 Encounters:   09/20/24 98.7 kg (217 lb 8 oz)   09/10/24 108 kg (237 lb 6.4 oz)   10/27/23 119 kg (261 lb 12.8 oz)   Incidentally found with acute volume overload with orthopnea, worsening LE edema. In the setting of  running out of his prescription for torsemide 40 mg daily. Was refilled at torsemide 20 mg daily and had been taking this for 1 week and reports of progressive symptoms since then.  BNP 1532  CXR with pulmonary vascular congestion.  Diuresing very well on IV Lasix 40 mg twice daily.  He is net -4.6 L since admission with -2 L overnight.  Weights as above.  He did complain of some orthopnea last night and had to switch over to his recliner.  He does continue to have some residual LE edema on exam.  Recommend additional 24 to 48 hours of IV diuretics then can switch to PO torsemide 40 mg daily.  TTE showed reduced EF to 30% (previously 40%). Etiology unknown. He did report of recent COVID-19 infection just prior to this admission. Would suggest ischemic evaluation plus or minus cardiac MRI which he will coordinate with his primary cardiologist Dr. Elaine.  Will also add Aldactone 25 mg daily to his regimen given worsened EF.  Hold off on adding ACEI/ARB/ARNI in the setting of CKD.    CKD (chronic kidney disease) stage 4, GFR 15-29 ml/min ([facility])  Lab Results   Component Value Date    EGFR 29 09/20/2024    EGFR 26 09/19/2024    EGFR 24 09/18/2024    CREATININE 2.14 (H) 09/20/2024    CREATININE 2.30 (H) 09/19/2024    CREATININE 2.49 (H) 09/18/2024   Presented with JOSH on CKD, likely cardiorenal syndrome.  Creatinine continues to improve on diuretic therapy.  Obstructive sleep apnea  BiPAP nightly.  Hypoxia    Pacemaker  History of SSS requiring PPM implant. He is EF dropped to 40% presumably due to predominant RV pacing. Upgraded to BiV device in 2022.  Appropriately functioning on telemetry.  A-fib ([facility])  Predominant pacer rhythm noted on telemetry. He has bursts of wide-complex tachycardia consistent with NSVT versus A-fib aberrancy. Overall heart rate controlled.  Patient is asymptomatic of this. Denies any chest pain or discomfort.    Non-ischemic myocardial injury (non-traumatic)  Troponin trend: 67/66 in the  "setting of acute CHF and CVA.  Denies chest pain or anginal equivalent. No ischemia noted on EKG.    Lumbar radiculopathy      History of Present Illness   Chief Complaint: \" I had difficulty going to sleep last night.  I turned off my BiPAP and sat in my recliner which helped.\"    Subjective: Denies shortness of breath at rest, urinating frequently, leg swelling is improving.  No chest pain or discomfort.    Objective      Temp:  [97.4 °F (36.3 °C)-97.9 °F (36.6 °C)] 97.4 °F (36.3 °C)  HR:  [] 79  Resp:  [18-20] 18  BP: (137-150)/() 138/100  O2 Device: None (Room air)   Vitals:    09/19/24 0422 09/20/24 0717   Weight: 101 kg (222 lb 14.2 oz) 98.7 kg (217 lb 8 oz)     Orthostatic Blood Pressures      Flowsheet Row Most Recent Value   Blood Pressure 138/100 filed at 09/20/2024 0717   Patient Position - Orthostatic VS Sitting filed at 09/20/2024 0717             I/O last 24 hours:  In: 1630 [P.O.:1630]  Out: 3700 [Urine:3700]  Lines/Drains/Airways       Active Status       Name Placement date Placement time Site Days    External Urinary Catheter Small 09/19/24 1924  -- less than 1                  Physical Exam  Constitutional:       General: He is not in acute distress.     Appearance: Normal appearance. He is obese. He is not ill-appearing.   HENT:      Head: Normocephalic and atraumatic.      Nose: Nose normal.      Mouth/Throat:      Mouth: Mucous membranes are moist.   Eyes:      Extraocular Movements: Extraocular movements intact.   Cardiovascular:      Rate and Rhythm: Normal rate and regular rhythm.      Pulses: Normal pulses.      Heart sounds: Normal heart sounds. No murmur heard.     No friction rub. No gallop.   Pulmonary:      Effort: Pulmonary effort is normal.      Breath sounds: Rales present. No wheezing or rhonchi.   Abdominal:      General: Abdomen is flat.      Palpations: Abdomen is soft.   Musculoskeletal:         General: Normal range of motion.      Cervical back: Normal range of " motion and neck supple.      Right lower leg: Edema present.      Left lower leg: Edema present.   Skin:     General: Skin is warm and dry.      Capillary Refill: Capillary refill takes less than 2 seconds.   Neurological:      General: No focal deficit present.      Mental Status: He is alert and oriented to person, place, and time.   Psychiatric:         Mood and Affect: Mood normal.         Behavior: Behavior normal.

## 2024-09-20 NOTE — ASSESSMENT & PLAN NOTE
">>ASSESSMENT AND PLAN FOR ESSENTIAL HYPERTENSION WRITTEN ON 9/20/2024  3:54 PM BY CAMMIE NAILS MD    /100 (BP Location: Right arm)   Pulse 85   Temp (!) 97.4 °F (36.3 °C) (Oral)   Resp 18   Ht 5' 11\" (1.803 m)   Wt 98.7 kg (217 lb 8 oz) Comment: Standing scale,  aware per rounding  SpO2 97%   BMI 30.34 kg/m²     Hold home regimen of amlodipine, losartan, hydralazine and torsemide  Continue with IV diuretics  Resumed Toprol-XL on 9/19  Aldactone 25 mg daily added 9/20   Vitals as per unit protocol  "

## 2024-09-20 NOTE — ASSESSMENT & PLAN NOTE
With history of paroxysmal atrial fibrillation  Home regimen consists of Toprol XL 50 mg daily and Eliquis 5 mg twice daily  Eliquis replaced by Xarelto  Continue with Toprol-XL  Continue with tele

## 2024-09-20 NOTE — SPEECH THERAPY NOTE
Speech Language/Pathology    Speech/Language Pathology Progress Note    Patient Name: Dangelo Cooper  Today's Date: 2024     Problem List  Principal Problem:    Acute CVA (cerebrovascular accident) ([facility])  Active Problems:    Chronic obstructive pulmonary disease (COPD) ([facility])    Type 2 diabetes mellitus, without long-term current use of insulin (HCC)    Essential hypertension    Acute HFrEF (heart failure with reduced ejection fraction) ([facility])    CKD (chronic kidney disease) stage 4, GFR 15-29 ml/min ([facility])    Obstructive sleep apnea    Hypoxia    Pacemaker    A-fib ([facility])    Non-ischemic myocardial injury (non-traumatic)    Lumbar radiculopathy       Past Medical History  Past Medical History:   Diagnosis Date    A-fib ([facility])     Arthritis     CHF (congestive heart failure) ([facility])     Chronic kidney disease don't remember    COPD (chronic obstructive pulmonary disease) ([facility])     Diabetes mellitus ([facility])     Enlarged prostate     Gout     Heart murmur birth    Hyperlipidemia     Hypertension         Past Surgical History  Past Surgical History:   Procedure Laterality Date    A-V CARDIAC PACEMAKER INSERTION  2022    EAR SURGERY      EYE SURGERY      cat. repaired    FRACTURE SURGERY         Updated History:  Last seen by SLP  recommending regular textures and thin liquids, meds as tolerated.     Updated Imagin24 MRI brain wo contrast:  Recent infarcts are seen in the deep white matter of the right frontal parietal junction and left frontal cortex and adjacent subcortical white matter. Bilateral involvement suggest embolic phenomenon.     Possible subacute infarct left occipital subcortical region.     Moderate to advanced chronic microangiopathic changes.    AAO: person, place, date     Subjective:  Pt awake and alert upon arrival, agreeable to PO trials, OOB in chair, breakfast tray present utilized during session. Pt denies difficulty swallowing (ie: coughing/choking, globus sensation). Pt also  reports speech/language at baseline, no concerns at this time.     Objective:  Materials administered: regular textures, thin liquids via cup and straw sip    Complete labial seal for retrieval and containment of all materials, no anterior bolus loss present. Adequate bite strength w/ timely rotary mastication of regular textures. Complete bolus breakdown and timely bolus transfer. No oral residue noted. Suspected fairly prompt swallow initiation and fair hyolaryngeal elevation to palpation. No overt s/s of aspiration at this time.     Assessment:  Pt presents w/ suspected functional oropharyngeal swallow skills characterized by descriptions above.     Plan/Recommendations:  Continue regular textures and thin liquids   Meds as tolerated  Swallow strategies: upright posture  Frequent/thorough oral care   ST to s/o as no skilled intervention required, no further IP ST necessary. Please re-consult if medically necessary

## 2024-09-20 NOTE — ASSESSMENT & PLAN NOTE
CT with - Diffuse lumbar spondylitic degenerative change with annular bulging, osteophyte formation and annular calcifications. Multilevel moderate canal stenosis and foraminal narrowing from L2-3 through L5-S1.  At L5-S1 foraminal narrowing is more severe. At L3-4 and L4-5 there is moderate bilateral foraminal narrowing including mass effect upon the exiting nerves, worst at the L3-4 level    Discussed with neurosurgery, recommended getting MRI lumbar spine that shows Multilevel lumbar spondylosis, as described above, contributing to at most severe canal stenosis at L3-L4, and multilevel neural foraminal stenosis, worst on the left at L3-L4 on the right at L4-S1.   Patient denies any red flag symptoms  Continue with conservative management for now

## 2024-09-20 NOTE — PHYSICAL THERAPY NOTE
Physical Therapy Cancellation Note       09/19/24 0900   PT Last Visit   PT Visit Date 09/19/24   Note Type   Note type Cancelled Session   Additional Comments PT orders received, chart reviewed. Pt presenting on stroke pathway. Pt currently pending MRI of brain & lumbar spine. Will hold & f/u once MRI lumbar spine completed/read & necessary POC established if warranted based off results.     Verena Sahu

## 2024-09-20 NOTE — ASSESSMENT & PLAN NOTE
Predominant pacer rhythm noted on telemetry. He has bursts of wide-complex tachycardia consistent with NSVT versus A-fib aberrancy. Overall heart rate controlled.  Patient is asymptomatic of this. Denies any chest pain or discomfort.

## 2024-09-20 NOTE — TELEPHONE ENCOUNTER
STILL ADMITTED:ED to Hosp-Admission  Current  9/18/2024 - present (2 days)  [facility]      HFU/ [facility]/ Stroke-like symptoms     DC-   ----- Message from Iris Bey PA-C sent at 9/20/2024 12:27 PM EDT -----  Dangelo Cooper will need follow-up in in 6 weeks with neurovascular team for Other (embolic stroke) in 60 minute appointment. They will not require outpatient neurological testing at this time.

## 2024-09-20 NOTE — ASSESSMENT & PLAN NOTE
Lab Results   Component Value Date    HGBA1C 7.3 (H) 09/19/2024   On insulin sliding scale as inpatient.     Recent Labs     09/19/24  1147 09/19/24  1555 09/20/24  0833 09/20/24  1041   POCGLU 158* 149* 108 282*       Blood Sugar Average: Last 72 hrs:  (P) [id number]

## 2024-09-20 NOTE — ASSESSMENT & PLAN NOTE
Wt Readings from Last 3 Encounters:   09/20/24 98.7 kg (217 lb 8 oz)   09/10/24 108 kg (237 lb 6.4 oz)   10/27/23 119 kg (261 lb 12.8 oz)   Incidentally found with acute volume overload with orthopnea, worsening LE edema. In the setting of running out of his prescription for torsemide 40 mg daily. Was refilled at torsemide 20 mg daily and had been taking this for 1 week and reports of progressive symptoms since then.  BNP 1532  CXR with pulmonary vascular congestion.  Diuresing very well on IV Lasix 40 mg twice daily.  He is net -4.6 L since admission with -2 L overnight.  Weights as above.  He did complain of some orthopnea last night and had to switch over to his recliner.  He does continue to have some residual LE edema on exam.  Recommend additional 24 to 48 hours of IV diuretics then can switch to PO torsemide 40 mg daily.  TTE showed reduced EF to 30% (previously 40%). Etiology unknown. He did report of recent COVID-19 infection just prior to this admission. Would suggest ischemic evaluation plus or minus cardiac MRI which he will coordinate with his primary cardiologist Dr. Elaine.  Will also add Aldactone 25 mg daily to his regimen given worsened EF.  Hold off on adding ACEI/ARB/ARNI in the setting of CKD.

## 2024-09-20 NOTE — ASSESSMENT & PLAN NOTE
History of SSS requiring PPM implant. He is EF dropped to 40% presumably due to predominant RV pacing. Upgraded to BiV device in 2022.  Appropriately functioning on telemetry.

## 2024-09-20 NOTE — ASSESSMENT & PLAN NOTE
Presented with left leg weakness and gait instability.  MRI brain shows recent infarcts within the deep white matter of the right frontal parietal junction and left frontal cortex and adjacent subcortical white matter.  Bilateral involvement suggesting embolic phenomena.  Possible subacute infarct left occipital subcortical region.  Moderate to advanced chronic microangiopathic changes.  Patient was on ASA, statin and Eliquis prior to this admission. Reports compliance of the same. We should assume Eliquis failure at this point and recommend switching him to an alternative agent, likely Xarelto 15mg QD or Pradaxa 75mg BID (CrCl=42mL/min). Per neurology, plan to continue dual therapy by discontinuing Brilinta and continuing ASA. Continue statin.

## 2024-09-20 NOTE — ASSESSMENT & PLAN NOTE
Lab Results   Component Value Date    HGBA1C 7.3 (H) 09/19/2024       Recent Labs     09/19/24  1147 09/19/24  1555 09/20/24  0833 09/20/24  1041   POCGLU 158* 149* 108 282*       Blood Sugar Average: Last 72 hrs:  (P) [id number]    Continue with insulin on sliding scale.    Current sugars within goal

## 2024-09-20 NOTE — CASE MANAGEMENT
Case Management Discharge Planning Note    Patient name Dangelo Cooper  Location /-01 MRN [id number]  : 1950 Date 2024       Current Admission Date: 2024  Current Admission Diagnosis:Acute CVA (cerebrovascular accident) ([facility])   Patient Active Problem List    Diagnosis Date Noted Date Diagnosed    Acute CVA (cerebrovascular accident) ([facility]) 2024     A-fib ([facility]) 2024     Non-ischemic myocardial injury (non-traumatic) 2024     Lumbar radiculopathy 2024     Sore throat 09/10/2024     COVID-19 2024     Polyneuropathy associated with underlying disease ([facility]) 2022     Stage 4 chronic kidney disease due to type 2 diabetes mellitus ([facility]) 2022     Ascending aorta dilatation ([facility]) 2022     Obesity, morbid ([facility]) 2022     Secondary hyperparathyroidism ([facility]) 2022     Pacemaker 2021     BMI 35.0-35.9,adult 2021     Chest pain 10/31/2021     Hypoxia 10/31/2021     Essential hypertension 2021     Acute HFrEF (heart failure with reduced ejection fraction) ([facility]) 2021     CKD (chronic kidney disease) stage 4, GFR 15-29 ml/min ([facility]) 2021     Obstructive sleep apnea 2021     NSTEMI (non-ST elevated myocardial infarction) ([facility]) 2017     Chronic obstructive pulmonary disease (COPD) ([facility]) 2017     Right sided weakness 2017     Hypertensive emergency 2017     Paroxysmal atrial fibrillation ([facility]) 2017     Type 2 diabetes mellitus, without long-term current use of insulin ([facility]) 2017       LOS (days): 2  Geometric Mean LOS (GMLOS) (days): 4.6  Days to GMLOS:2.3     OBJECTIVE:  Risk of Unplanned Readmission Score: 16.39         Current admission status: Inpatient   Preferred Pharmacy:   [facility] [address] [place] [address] - [address] [facility]  [address] [facility] [address]  Phone: [phone] Fax: [phone]    Primary Care Provider: Ada Benites,  DO    Primary Insurance: [facility]  Secondary Insurance:     DISCHARGE DETAILS:                                Requested Home Health Care         Is the patient interested in HHC at discharge?: Yes  Home Health Discipline requested:: Nursing, Occupational Therapy, Physical Therapy  Home Health Agency Name:: Other  HHA External Referral Reason (only applicable if external HHA name selected): Patient has established relationship with provider  Home Health Follow-Up Provider:: PCP  Home Health Services Needed:: Urinary Incontinence Catheter Management, Evaluate Functional Status and Safety, Strengthening/Theraputic Exercises to Improve Function, Gait/ADL Training  Homebound Criteria Met:: Uses an Assist Device (i.e. cane, walker, etc), Requires the Assistance of Another Person for Safe Ambulation or to Leave the Home  Supporting Clincal Findings:: Limited Endurance, Fatigues Easliy in Short Distances    DME Referral Provided  Referral made for DME?: No    Other Referral/Resources/Interventions Provided:  Interventions: HHC  Referral Comments: CM made blanket referrals in [place] for HHC. Spoke with pt and spouse, and both are agreeable.         Treatment Team Recommendation: Home with Home Health Care  Discharge Destination Plan:: Home with Home Health Care            CM was informed by [facility] not to price check Jardiance. CM to follow.

## 2024-09-20 NOTE — ASSESSMENT & PLAN NOTE
Lab Results   Component Value Date    EGFR 29 09/20/2024    EGFR 26 09/19/2024    EGFR 24 09/18/2024    CREATININE 2.14 (H) 09/20/2024    CREATININE 2.30 (H) 09/19/2024    CREATININE 2.49 (H) 09/18/2024   Presented with JOSH on CKD, likely cardiorenal syndrome.  Creatinine continues to improve on diuretic therapy.

## 2024-09-21 ENCOUNTER — HOME HEALTH ADMISSION (OUTPATIENT)
Dept: HOME HEALTH SERVICES | Facility: HOME HEALTHCARE | Age: 74
End: 2024-09-21
Payer: COMMERCIAL

## 2024-09-21 LAB
ANION GAP SERPL CALCULATED.3IONS-SCNC: 10 MMOL/L (ref 4–13)
BASOPHILS # BLD AUTO: 0.06 THOUSANDS/ΜL (ref 0–0.1)
BASOPHILS NFR BLD AUTO: 1 % (ref 0–1)
BUN SERPL-MCNC: 45 MG/DL (ref 5–25)
CALCIUM SERPL-MCNC: 9.6 MG/DL (ref 8.4–10.2)
CHLORIDE SERPL-SCNC: 103 MMOL/L (ref 96–108)
CO2 SERPL-SCNC: 25 MMOL/L (ref 21–32)
CREAT SERPL-MCNC: 2.29 MG/DL (ref 0.6–1.3)
EOSINOPHIL # BLD AUTO: 0.33 THOUSAND/ΜL (ref 0–0.61)
EOSINOPHIL NFR BLD AUTO: 4 % (ref 0–6)
ERYTHROCYTE [DISTWIDTH] IN BLOOD BY AUTOMATED COUNT: 18.1 % (ref 11.6–15.1)
GFR SERPL CREATININE-BSD FRML MDRD: 26 ML/MIN/1.73SQ M
GLUCOSE SERPL-MCNC: 100 MG/DL (ref 65–140)
GLUCOSE SERPL-MCNC: 144 MG/DL (ref 65–140)
GLUCOSE SERPL-MCNC: 147 MG/DL (ref 65–140)
GLUCOSE SERPL-MCNC: 214 MG/DL (ref 65–140)
HCT VFR BLD AUTO: 47.3 % (ref 36.5–49.3)
HGB BLD-MCNC: 15 G/DL (ref 12–17)
IMM GRANULOCYTES # BLD AUTO: 0.08 THOUSAND/UL (ref 0–0.2)
IMM GRANULOCYTES NFR BLD AUTO: 1 % (ref 0–2)
LYMPHOCYTES # BLD AUTO: 1.36 THOUSANDS/ΜL (ref 0.6–4.47)
LYMPHOCYTES NFR BLD AUTO: 14 % (ref 14–44)
MAGNESIUM SERPL-MCNC: 2.5 MG/DL (ref 1.9–2.7)
MCH RBC QN AUTO: 27.8 PG (ref 26.8–34.3)
MCHC RBC AUTO-ENTMCNC: 31.7 G/DL (ref 31.4–37.4)
MCV RBC AUTO: 88 FL (ref 82–98)
MONOCYTES # BLD AUTO: 0.93 THOUSAND/ΜL (ref 0.17–1.22)
MONOCYTES NFR BLD AUTO: 10 % (ref 4–12)
NEUTROPHILS # BLD AUTO: 6.77 THOUSANDS/ΜL (ref 1.85–7.62)
NEUTS SEG NFR BLD AUTO: 70 % (ref 43–75)
NRBC BLD AUTO-RTO: 0 /100 WBCS
PLATELET # BLD AUTO: 280 THOUSANDS/UL (ref 149–390)
PMV BLD AUTO: 9 FL (ref 8.9–12.7)
POTASSIUM SERPL-SCNC: 3.9 MMOL/L (ref 3.5–5.3)
RBC # BLD AUTO: 5.4 MILLION/UL (ref 3.88–5.62)
SODIUM SERPL-SCNC: 138 MMOL/L (ref 135–147)
WBC # BLD AUTO: 9.53 THOUSAND/UL (ref 4.31–10.16)

## 2024-09-21 PROCEDURE — 94640 AIRWAY INHALATION TREATMENT: CPT

## 2024-09-21 PROCEDURE — 80048 BASIC METABOLIC PNL TOTAL CA: CPT | Performed by: INTERNAL MEDICINE

## 2024-09-21 PROCEDURE — 85025 COMPLETE CBC W/AUTO DIFF WBC: CPT | Performed by: INTERNAL MEDICINE

## 2024-09-21 PROCEDURE — 94660 CPAP INITIATION&MGMT: CPT

## 2024-09-21 PROCEDURE — 99232 SBSQ HOSP IP/OBS MODERATE 35: CPT | Performed by: INTERNAL MEDICINE

## 2024-09-21 PROCEDURE — 94760 N-INVAS EAR/PLS OXIMETRY 1: CPT

## 2024-09-21 PROCEDURE — 82948 REAGENT STRIP/BLOOD GLUCOSE: CPT

## 2024-09-21 PROCEDURE — 83735 ASSAY OF MAGNESIUM: CPT | Performed by: INTERNAL MEDICINE

## 2024-09-21 RX ORDER — SODIUM CHLORIDE FOR INHALATION 3 %
4 VIAL, NEBULIZER (ML) INHALATION
Status: DISCONTINUED | OUTPATIENT
Start: 2024-09-21 | End: 2024-09-22

## 2024-09-21 RX ORDER — LEVALBUTEROL INHALATION SOLUTION 1.25 MG/3ML
1.25 SOLUTION RESPIRATORY (INHALATION)
Status: DISCONTINUED | OUTPATIENT
Start: 2024-09-21 | End: 2024-09-22 | Stop reason: HOSPADM

## 2024-09-21 RX ADMIN — LEVALBUTEROL HYDROCHLORIDE 1.25 MG: 1.25 SOLUTION RESPIRATORY (INHALATION) at 19:23

## 2024-09-21 RX ADMIN — SODIUM CHLORIDE SOLN NEBU 3% 4 ML: 3 NEBU SOLN at 19:21

## 2024-09-21 RX ADMIN — DOCUSATE SODIUM 100 MG: 100 CAPSULE, LIQUID FILLED ORAL at 17:14

## 2024-09-21 RX ADMIN — RIVAROXABAN 15 MG: 15 TABLET, FILM COATED ORAL at 08:49

## 2024-09-21 RX ADMIN — METOPROLOL SUCCINATE 50 MG: 50 TABLET, EXTENDED RELEASE ORAL at 08:49

## 2024-09-21 RX ADMIN — TAMSULOSIN HYDROCHLORIDE 0.8 MG: 0.4 CAPSULE ORAL at 17:13

## 2024-09-21 RX ADMIN — ALLOPURINOL 50 MG: 100 TABLET ORAL at 08:49

## 2024-09-21 RX ADMIN — SPIRONOLACTONE 25 MG: 25 TABLET ORAL at 08:49

## 2024-09-21 RX ADMIN — ATORVASTATIN CALCIUM 40 MG: 40 TABLET, FILM COATED ORAL at 17:13

## 2024-09-21 RX ADMIN — INSULIN LISPRO 2 UNITS: 100 INJECTION, SOLUTION INTRAVENOUS; SUBCUTANEOUS at 12:18

## 2024-09-21 RX ADMIN — GUAIFENESIN 600 MG: 600 TABLET ORAL at 21:20

## 2024-09-21 RX ADMIN — FUROSEMIDE 40 MG: 10 INJECTION, SOLUTION INTRAVENOUS at 08:51

## 2024-09-21 RX ADMIN — GUAIFENESIN 600 MG: 600 TABLET ORAL at 08:49

## 2024-09-21 RX ADMIN — SODIUM CHLORIDE SOLN NEBU 3% 4 ML: 3 NEBU SOLN at 10:42

## 2024-09-21 RX ADMIN — LEVALBUTEROL HYDROCHLORIDE 1.25 MG: 1.25 SOLUTION RESPIRATORY (INHALATION) at 13:39

## 2024-09-21 RX ADMIN — FUROSEMIDE 40 MG: 10 INJECTION, SOLUTION INTRAVENOUS at 17:15

## 2024-09-21 RX ADMIN — DOCUSATE SODIUM 100 MG: 100 CAPSULE, LIQUID FILLED ORAL at 08:49

## 2024-09-21 RX ADMIN — FLUTICASONE FUROATE AND VILANTEROL TRIFENATATE 1 PUFF: 100; 25 POWDER RESPIRATORY (INHALATION) at 08:55

## 2024-09-21 RX ADMIN — ASPIRIN 81 MG CHEWABLE TABLET 81 MG: 81 TABLET CHEWABLE at 08:49

## 2024-09-21 RX ADMIN — SODIUM CHLORIDE SOLN NEBU 3% 4 ML: 3 NEBU SOLN at 13:39

## 2024-09-21 RX ADMIN — LEVALBUTEROL HYDROCHLORIDE 1.25 MG: 1.25 SOLUTION RESPIRATORY (INHALATION) at 10:42

## 2024-09-21 RX ADMIN — PANTOPRAZOLE SODIUM 40 MG: 40 TABLET, DELAYED RELEASE ORAL at 08:49

## 2024-09-21 NOTE — ASSESSMENT & PLAN NOTE
Predominant pacer rhythm noted on telemetry. He has bursts of wide-complex tachycardia consistent with NSVT versus A-fib aberrancy. Overall heart rate controlled.  Patient is asymptomatic of this. Denies any chest pain or discomfort.  Continue medical management with Xarelto for anticoagulation and metoprolol for rate control

## 2024-09-21 NOTE — ASSESSMENT & PLAN NOTE
>>ASSESSMENT AND PLAN FOR ESSENTIAL HYPERTENSION WRITTEN ON 9/21/2024  9:24 AM BY XANDER SANDERS MD    Permissive hypertension allowed on admission. Now goal normotension.  Last /95.  Resumed on Toprol-XL 50 mg daily and added for Aldactone 25 mg daily.  Continue to follow with adjustments in medications

## 2024-09-21 NOTE — ASSESSMENT & PLAN NOTE
With history of paroxysmal atrial fibrillation  Home regimen consists of Toprol XL 50 mg daily and Eliquis 5 mg twice daily  Eliquis replaced by Xarelto  Continue with Toprol-XL

## 2024-09-21 NOTE — PROGRESS NOTES
Cardiology Progress Note - Dangelo Cooper 73 y.o. male MRN: [id number]    Unit/Bed#: -01 Encounter: [id number]        Assessment & Plan  Acute CVA (cerebrovascular accident) ([facility])  Presented with left leg weakness and gait instability.  MRI brain shows recent infarcts within the deep white matter of the right frontal parietal junction and left frontal cortex and adjacent subcortical white matter.  Bilateral involvement suggesting embolic phenomena.  Possible subacute infarct left occipital subcortical region.  Moderate to advanced chronic microangiopathic changes.  Patient was on ASA, statin and Eliquis prior to this admission. Reports compliance of the same. We should assume Eliquis failure at this point and recommend switching him to an alternative agent, likely Xarelto 15mg QD or Pradaxa 75mg BID (CrCl=42mL/min). Per neurology, plan to continue dual therapy by discontinuing Brilinta and continuing ASA. Continue statin.  Chronic obstructive pulmonary disease (COPD) ([facility])  Controlled.  Does not require O2 at home.  Management as per primary team  Type 2 diabetes mellitus, without long-term current use of insulin ([facility])  Lab Results   Component Value Date    HGBA1C 7.3 (H) 09/19/2024   On insulin sliding scale as inpatient.  Management as per primary team    Recent Labs     09/20/24  0833 09/20/24  1041 09/20/24  1635 09/21/24  0800   POCGLU 108 282* 104 147*       Blood Sugar Average: Last 72 hrs:  (P) [id number]    Essential hypertension  Permissive hypertension allowed on admission. Now goal normotension.  Last /95.  Resumed on Toprol-XL 50 mg daily and added for Aldactone 25 mg daily.  Continue to follow with adjustments in medications  Acute HFrEF (heart failure with reduced ejection fraction) ([facility])  Wt Readings from Last 3 Encounters:   09/21/24 99.4 kg (219 lb 1.6 oz)   09/10/24 108 kg (237 lb 6.4 oz)   10/27/23 119 kg (261 lb 12.8 oz)   Incidentally found with acute volume overload  with orthopnea, worsening LE edema. In the setting of running out of his prescription for torsemide 40 mg daily. Was refilled at torsemide 20 mg daily and had been taking this for 1 week and reports of progressive symptoms since then.  BNP 1532  CXR with pulmonary vascular congestion.  Diuresing very well on IV Lasix 40 mg twice daily.  He is net -5.9 L since admission with 2L urine output overnight.  Weights as above.  He does continue to have some residual LE edema on exam.  Recommend additional 24  hours of IV diuretics then can switch to PO torsemide 40 mg daily.  TTE showed reduced EF to 30% (previously 40%). Etiology unknown. He did report of recent COVID-19 infection just prior to this admission. Would suggest ischemic evaluation plus or minus cardiac MRI which he will coordinate with his primary cardiologist Dr. Elaine.  Also added Aldactone 25 mg daily to his regimen given worsened EF.  Hold off on adding ACEI/ARB/ARNI in the setting of CKD.  To start on Jardiance as well this admission, possibly in a.m.  Patient is agreeable for LifeVest in the setting of reduced ejection fraction, this has been ordered and will hopefully be arranged today    CKD (chronic kidney disease) stage 4, GFR 15-29 ml/min ([facility])  Lab Results   Component Value Date    EGFR 26 09/21/2024    EGFR 29 09/20/2024    EGFR 26 09/19/2024    CREATININE 2.29 (H) 09/21/2024    CREATININE 2.14 (H) 09/20/2024    CREATININE 2.30 (H) 09/19/2024   Presented with JOSH on CKD, likely cardiorenal syndrome.  Creatinine stable on diuretic therapy.  Obstructive sleep apnea  BiPAP nightly.  Hypoxia  Maintained on no oxygen support overnight other than as for treatment for sleep apnea, with stable O2 sats  Pacemaker  History of SSS requiring PPM implant. He is EF dropped to 40% presumably due to predominant RV pacing. Upgraded to BiV device in 2022.  Appropriately functioning on telemetry.  Ejection fraction noted to be reduced compared to prior, which  "could be multifactorial with severity of aortic stenosis and mitral regurgitation along with continued ventricular pacing  A-fib (HCC)  Predominant pacer rhythm noted on telemetry. He has bursts of wide-complex tachycardia consistent with NSVT versus A-fib aberrancy. Overall heart rate controlled.  Patient is asymptomatic of this. Denies any chest pain or discomfort.  Continue medical management with Xarelto for anticoagulation and metoprolol for rate control    Non-ischemic myocardial injury (non-traumatic)  Troponin trend: 67/66 in the setting of acute CHF and CVA.  Denies chest pain or anginal equivalent. No ischemia noted on EKG.    Possibly related to volume overload in the setting of heart failure exacerbation  Lumbar radiculopathy  Management as per outpatient evaluation    Subjective:   Patient seen and examined.  No significant events overnight.  stable dyspnea on exertion, lower extremity edema, ; pertinent negatives - chest pain, irregular heart beat, and palpitations.    Objective:     Vitals: Blood pressure 138/95, pulse 72, temperature (!) 97.4 °F (36.3 °C), temperature source Oral, resp. rate 21, height 5' 11\" (1.803 m), weight 99.4 kg (219 lb 1.6 oz), SpO2 94%., Body mass index is 30.56 kg/m².,   Orthostatic Blood Pressures      Flowsheet Row Most Recent Value   Blood Pressure 138/95 filed at 09/21/2024 0746   Patient Position - Orthostatic VS Sitting filed at 09/21/2024 0746              Intake/Output Summary (Last 24 hours) at 9/21/2024 0915  Last data filed at 9/21/2024 0858  Gross per 24 hour   Intake 1200 ml   Output 2032 ml   Net -832 ml       Physical Exam:    GEN: Dangelo Cooper appears well, alert and oriented x 3, pleasant and cooperative   HEENT: pupils equal, round, and reactive to light; extraocular muscles intact  NECK: supple, no carotid bruits   HEART: regular rhythm, normal S1 absent S2, + holosystolic murmur, no clicks, gallops or rubs   LUNGS: + Bibasilar rales  ABDOMEN: normal " bowel sounds, soft, no tenderness, no distention  EXTREMITIES: peripheral pulses normal; no clubbing, cyanosis, + edema  NEURO: no focal findings   SKIN: normal without suspicious lesions on exposed skin    Medications:      Current Facility-Administered Medications:     acetaminophen (TYLENOL) tablet 650 mg, 650 mg, Oral, Q6H PRN, Linda Moran MD    albuterol (PROVENTIL HFA,VENTOLIN HFA) inhaler 2 puff, 2 puff, Inhalation, Q6H PRN, Linda Moran MD    allopurinol (ZYLOPRIM) tablet 50 mg, 50 mg, Oral, Every Other Day, Linda Moran MD, 50 mg at 09/21/24 0849    aspirin chewable tablet 81 mg, 81 mg, Oral, Daily, Linda Moran MD, 81 mg at 09/21/24 0849    atorvastatin (LIPITOR) tablet 40 mg, 40 mg, Oral, QPM, Linda Moran MD, 40 mg at 09/20/24 1710    docusate sodium (COLACE) capsule 100 mg, 100 mg, Oral, BID, Linda Moran MD, 100 mg at 09/21/24 0849    Fluticasone Furoate-Vilanterol 100-25 mcg/actuation 1 puff, 1 puff, Inhalation, Daily, Linda Moran MD, 1 puff at 09/21/24 0855    furosemide (LASIX) injection 40 mg, 40 mg, Intravenous, BID (diuretic), Tay Gaston PA-C, 40 mg at 09/21/24 0851    guaiFENesin (MUCINEX) 12 hr tablet 600 mg, 600 mg, Oral, Q12H SUSAN, Linda Moran MD, 600 mg at 09/21/24 0849    insulin lispro (HumALOG/ADMELOG) 100 units/mL subcutaneous injection 1-5 Units, 1-5 Units, Subcutaneous, TID AC, 3 Units at 09/20/24 1213 **AND** Fingerstick Glucose (POCT), , , TID AC, Linda Moran MD    metoprolol succinate (TOPROL-XL) 24 hr tablet 50 mg, 50 mg, Oral, Daily, Linda Moran MD, 50 mg at 09/21/24 0849    pantoprazole (PROTONIX) EC tablet 40 mg, 40 mg, Oral, Daily, Linda Moran MD, 40 mg at 09/21/24 0849    rivaroxaban (XARELTO) tablet 15 mg, 15 mg, Oral, Daily With Breakfast, Linda Moran MD, 15 mg at 09/21/24 0849    sodium chloride (OCEAN) 0.65 % nasal spray 2 spray, 2 spray, Each Nare, Q1H PRN, Linda Moran MD    spironolactone (ALDACTONE) tablet 25 mg, 25 mg, Oral, Daily, Tay Gaston PA-C, 25 mg at  09/21/24 0849    tamsulosin (FLOMAX) capsule 0.8 mg, 0.8 mg, Oral, Daily With Dinner, Linda Moran MD, 0.8 mg at 09/20/24 1628     Labs & Results:        Results from last 7 days   Lab Units 09/21/24 0417 09/20/24 0442 09/19/24  0402   WBC Thousand/uL 9.53 11.03* 9.38   HEMOGLOBIN g/dL 15.0 14.0 13.0   HEMATOCRIT % 47.3 45.4 40.8   PLATELETS Thousands/uL 280 292 250     Results from last 7 days   Lab Units 09/18/24  0925   TRIGLYCERIDES mg/dL 62   HDL mg/dL 35*     Results from last 7 days   Lab Units 09/21/24 0417 09/20/24 0442 09/19/24  0402 09/18/24  0925   POTASSIUM mmol/L 3.9 3.7 3.8 4.0   CHLORIDE mmol/L 103 102 105 104   CO2 mmol/L 25 26 28 30   BUN mg/dL 45* 39* 40* 45*   CREATININE mg/dL 2.29* 2.14* 2.30* 2.49*   CALCIUM mg/dL 9.6 9.4 9.1 9.2   ALK PHOS U/L  --   --  69 72   ALT U/L  --   --  14 16   AST U/L  --   --  18 20     Results from last 7 days   Lab Units 09/18/24  0925   INR  1.72*   PTT seconds 36*     Results from last 7 days   Lab Units 09/21/24 0417 09/20/24 0442 09/19/24  0402   MAGNESIUM mg/dL 2.5 2.4 2.5       Echo: personally reviewed -EF 30% with dilated LV and global hypokinesis with regional variation.  Dilated RV with reduced function.  Biatrial enlargement.  Moderate to severe aortic stenosis, moderate to severe mitral regurgitation, moderate tricuspid regurgitation.  Small pericardial effusion posterior to the heart without tamponade, severe pulmonary hypertension    EKG personally reviewed by Yamilet Toussaint MD.

## 2024-09-21 NOTE — PLAN OF CARE
Problem: Neurological Deficit  Goal: Neurological status is stable or improving  Description: Interventions:  - Monitor and assess patient's level of consciousness, motor function, sensory function, and level of assistance needed for ADLs.   - Monitor and report changes from baseline. Collaborate with interdisciplinary team to initiate plan and implement interventions as ordered.   - Provide and maintain a safe environment.  - Consider seizure precautions.  - Consider fall precautions.  - Consider aspiration precautions.  - Consider bleeding precautions.  Outcome: Progressing     Problem: MUSCULOSKELETAL - ADULT  Goal: Maintain or return mobility to safest level of function  Description: INTERVENTIONS:  - Assess patient's ability to carry out ADLs; assess patient's baseline for ADL function and identify physical deficits which impact ability to perform ADLs (bathing, care of mouth/teeth, toileting, grooming, dressing, etc.)  - Assess/evaluate cause of self-care deficits   - Assess range of motion  - Assess patient's mobility  - Assess patient's need for assistive devices and provide as appropriate  - Encourage maximum independence but intervene and supervise when necessary  - Involve family in performance of ADLs  - Assess for home care needs following discharge   - Consider OT consult to assist with ADL evaluation and planning for discharge  - Provide patient education as appropriate  Outcome: Progressing

## 2024-09-21 NOTE — ASSESSMENT & PLAN NOTE
Maintained on no oxygen support overnight other than as for treatment for sleep apnea, with stable O2 sats

## 2024-09-21 NOTE — ASSESSMENT & PLAN NOTE
Troponin trend: 67/66 in the setting of acute CHF and CVA.  Denies chest pain or anginal equivalent. No ischemia noted on EKG.    Possibly related to volume overload in the setting of heart failure exacerbation

## 2024-09-21 NOTE — ASSESSMENT & PLAN NOTE
Permissive hypertension allowed on admission. Now goal normotension.  Last /95.  Resumed on Toprol-XL 50 mg daily and added for Aldactone 25 mg daily.  Continue to follow with adjustments in medications

## 2024-09-21 NOTE — ASSESSMENT & PLAN NOTE
Lab Results   Component Value Date    HGBA1C 7.3 (H) 09/19/2024   On insulin sliding scale as inpatient.  Management as per primary team    Recent Labs     09/20/24  0833 09/20/24  1041 09/20/24  1635 09/21/24  0800   POCGLU 108 282* 104 147*       Blood Sugar Average: Last 72 hrs:  (P) [id number]

## 2024-09-21 NOTE — ASSESSMENT & PLAN NOTE
">>ASSESSMENT AND PLAN FOR ESSENTIAL HYPERTENSION WRITTEN ON 9/21/2024  3:31 PM BY CAMMIE NAILS MD    /95 (BP Location: Left arm)   Pulse 72   Temp (!) 97.2 °F (36.2 °C) (Oral)   Resp (!) 24   Ht 5' 11\" (1.803 m)   Wt 99.4 kg (219 lb 1.6 oz)   SpO2 97%   BMI 30.56 kg/m²     Hold home regimen of amlodipine, losartan, hydralazine and torsemide  Continue with IV diuretics  Resumed Toprol-XL on 9/19  Aldactone 25 mg daily added 9/20   Vitals as per unit protocol  "

## 2024-09-21 NOTE — ASSESSMENT & PLAN NOTE
Lab Results   Component Value Date    EGFR 26 09/21/2024    EGFR 29 09/20/2024    EGFR 26 09/19/2024    CREATININE 2.29 (H) 09/21/2024    CREATININE 2.14 (H) 09/20/2024    CREATININE 2.30 (H) 09/19/2024     Urinary retention protocol  Monitor BUN/creatinine while on IV diuretics  Hold losartan

## 2024-09-21 NOTE — ASSESSMENT & PLAN NOTE
Wt Readings from Last 3 Encounters:   09/21/24 99.4 kg (219 lb 1.6 oz)   09/10/24 108 kg (237 lb 6.4 oz)   10/27/23 119 kg (261 lb 12.8 oz)     Chest x-ray with pulmonary venous congestion   BNP-1532    Home regimen torsemide 40 mg daily, recently decreased to 20 mg daily  Patient reports compliance with regimen    Will continue with IV Lasix 40 twice daily, continue for another 24-48 hours   Daily weights and strict I's and O's  Salt restriction  Echo with EF of 30%, with moderate to severe AS, global hypokinesis  Life vest prior to discharge  Appreciate cardiology's assistance

## 2024-09-21 NOTE — ASSESSMENT & PLAN NOTE
>>ASSESSMENT AND PLAN FOR ACUTE CVA (CEREBROVASCULAR ACCIDENT) (HCC) WRITTEN ON 9/21/2024  9:24 AM BY XANDER SANDERS MD    Presented with left leg weakness and gait instability.  MRI brain shows recent infarcts within the deep white matter of the right frontal parietal junction and left frontal cortex and adjacent subcortical white matter.  Bilateral involvement suggesting embolic phenomena.  Possible subacute infarct left occipital subcortical region.  Moderate to advanced chronic microangiopathic changes.  Patient was on ASA, statin and Eliquis prior to this admission. Reports compliance of the same. We should assume Eliquis failure at this point and recommend switching him to an alternative agent, likely Xarelto 15mg QD or Pradaxa 75mg BID (CrCl=42mL/min). Per neurology, plan to continue dual therapy by discontinuing Brilinta and continuing ASA. Continue statin.

## 2024-09-21 NOTE — ASSESSMENT & PLAN NOTE
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    Evaluated by neurology, recommend continue with stroke pathway  MRI brain : Recent infarcts in the white matter of right frontal parietal junction and left frontal cortex and adjacent subcortical white matter.  Bilateral involvement suggest embolic phenomena.  Possible subacute infarct of left occipital subcortical region  Echo-EF 30%, severe global hypokinesis, moderate to severe AAS  Lipid panel and HbA1c reviewed  Continue with aspirin  Eliquis discontinued, started on Xarelto, as acute CVA considered Eliquis failure  Normotension/euglycemia  Continue telemetry  PT/OT-no rehab needs

## 2024-09-21 NOTE — ASSESSMENT & PLAN NOTE
Wt Readings from Last 3 Encounters:   09/21/24 99.4 kg (219 lb 1.6 oz)   09/10/24 108 kg (237 lb 6.4 oz)   10/27/23 119 kg (261 lb 12.8 oz)   Incidentally found with acute volume overload with orthopnea, worsening LE edema. In the setting of running out of his prescription for torsemide 40 mg daily. Was refilled at torsemide 20 mg daily and had been taking this for 1 week and reports of progressive symptoms since then.  BNP 1532  CXR with pulmonary vascular congestion.  Diuresing very well on IV Lasix 40 mg twice daily.  He is net -5.9 L since admission with 2L urine output overnight.  Weights as above.  He does continue to have some residual LE edema on exam.  Recommend additional 24  hours of IV diuretics then can switch to PO torsemide 40 mg daily.  TTE showed reduced EF to 30% (previously 40%). Etiology unknown. He did report of recent COVID-19 infection just prior to this admission. Would suggest ischemic evaluation plus or minus cardiac MRI which he will coordinate with his primary cardiologist Dr. Elaine.  Also added Aldactone 25 mg daily to his regimen given worsened EF.  Hold off on adding ACEI/ARB/ARNI in the setting of CKD.  To start on Jardiance as well this admission, possibly in a.m.  Patient is agreeable for LifeVest in the setting of reduced ejection fraction, this has been ordered and will hopefully be arranged today

## 2024-09-21 NOTE — ASSESSMENT & PLAN NOTE
Lab Results   Component Value Date    HGBA1C 7.3 (H) 09/19/2024       Recent Labs     09/20/24  1041 09/20/24  1635 09/21/24  0800 09/21/24  1103   POCGLU 282* 104 147* 214*       Blood Sugar Average: Last 72 hrs:  (P) 160.8    Continue with insulin on sliding scale.    Current sugars within goal

## 2024-09-21 NOTE — PROGRESS NOTES
Progress Note - Hospitalist   Name: Dangelo Cooper 73 y.o. 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    Evaluated by neurology, recommend continue with stroke pathway  MRI brain : Recent infarcts in the white matter of right frontal parietal junction and left frontal cortex and adjacent subcortical white matter.  Bilateral involvement suggest embolic phenomena.  Possible subacute infarct of left occipital subcortical region  Echo-EF 30%, severe global hypokinesis, moderate to severe AAS  Lipid panel and HbA1c reviewed  Continue with aspirin  Eliquis discontinued, started on Xarelto, as acute CVA considered Eliquis failure  Normotension/euglycemia  Continue telemetry  PT/OT-no rehab needs    A-fib ([facility])  With history of paroxysmal atrial fibrillation  Home regimen consists of Toprol XL 50 mg daily and Eliquis 5 mg twice daily  Eliquis replaced by Xarelto  Continue with Toprol-XL  Continue with tele  Chronic obstructive pulmonary disease (COPD) ([facility])  Does not appear to be in exacerbation  On 2 L of oxygen secondary to volume overload, however on room air now  Continue with home bronchodilator therapy  Respiratory protocol  Type 2 diabetes mellitus, without long-term  "current use of insulin ([facility])  Lab Results   Component Value Date    HGBA1C 7.3 (H) 09/19/2024       Recent Labs     09/20/24  1041 09/20/24  1635 09/21/24  0800 09/21/24  1103   POCGLU 282* 104 147* 214*       Blood Sugar Average: Last 72 hrs:  (P) 160.8    Continue with insulin on sliding scale.    Current sugars within goal  Essential hypertension  /95 (BP Location: Left arm)   Pulse 72   Temp (!) 97.2 °F (36.2 °C) (Oral)   Resp (!) 24   Ht 5' 11\" (1.803 m)   Wt 99.4 kg (219 lb 1.6 oz)   SpO2 97%   BMI 30.56 kg/m²     Hold home regimen of amlodipine, losartan, hydralazine and torsemide  Continue with IV diuretics  Resumed Toprol-XL on 9/19  Aldactone 25 mg daily added 9/20   Vitals as per unit protocol  Acute HFrEF (heart failure with reduced ejection fraction) ([facility])  Wt Readings from Last 3 Encounters:   09/21/24 99.4 kg (219 lb 1.6 oz)   09/10/24 108 kg (237 lb 6.4 oz)   10/27/23 119 kg (261 lb 12.8 oz)     Chest x-ray with pulmonary venous congestion   BNP-1532    Home regimen torsemide 40 mg daily, recently decreased to 20 mg daily  Patient reports compliance with regimen    Will continue with IV Lasix 40 twice daily, continue for another 24-48 hours   Daily weights and strict I's and O's  Salt restriction  Echo with EF of 30%, with moderate to severe AS, global hypokinesis  Life vest prior to discharge  Appreciate cardiology's assistance        CKD (chronic kidney disease) stage 4, GFR 15-29 ml/min ([facility])  Lab Results   Component Value Date    EGFR 26 09/21/2024    EGFR 29 09/20/2024    EGFR 26 09/19/2024    CREATININE 2.29 (H) 09/21/2024    CREATININE 2.14 (H) 09/20/2024    CREATININE 2.30 (H) 09/19/2024     Urinary retention protocol  Monitor BUN/creatinine while on IV diuretics  Hold losartan     Obstructive sleep apnea  Continue with CPAP   Hypoxia  Not on oxygen at baseline  On 2 L at the time of admission  Now on room air  CXR with pulmonary venous congestion   Continue with IV " diuretics  Respiratory protocol  Pacemaker  noted  Non-ischemic myocardial injury (non-traumatic)  Lab Results   Component Value Date    HSTNI0 67 (H) 09/18/2024    HSTNI2 66 (H) 09/18/2024    HSTNID2 -1 09/18/2024      Secondary to acute heart failure  Denies any chest pain  Continue with telemetry monitoring  Cardiology following  Lumbar radiculopathy  CT with - Diffuse lumbar spondylitic degenerative change with annular bulging, osteophyte formation and annular calcifications. Multilevel moderate canal stenosis and foraminal narrowing from L2-3 through L5-S1.  At L5-S1 foraminal narrowing is more severe. At L3-4 and L4-5 there is moderate bilateral foraminal narrowing including mass effect upon the exiting nerves, worst at the L3-4 level    Discussed with neurosurgery, recommended getting MRI lumbar spine that shows Multilevel lumbar spondylosis, as described above, contributing to at most severe canal stenosis at L3-L4, and multilevel neural foraminal stenosis, worst on the left at L3-L4 on the right at L4-S1.   Patient denies any red flag symptoms  Continue with conservative management for now    VTE Pharmacologic Prophylaxis:   xarelto     Mobility:   Basic Mobility Inpatient Raw Score: 22  JH-HLM Goal: 7: Walk 25 feet or more  JH-HLM Achieved: 7: Walk 25 feet or more  JH-HLM Goal achieved. Continue to encourage appropriate mobility.    Patient Centered Rounds: I performed bedside rounds with nursing staff today.   Discussions with Specialists or Other Care Team Provider: yes    Education and Discussions with Family / Patient: wife at bedside\    Current Length of Stay: 3 day(s)  Current Patient Status: Inpatient   Certification Statement: The patient will continue to require additional inpatient hospital stay due to pending stabilization  Discharge Plan:  until stabilized     Code Status: Level 1 - Full Code    Subjective   Seen and examined at bedside  Awake and alert   No complaints  Feels  better    Objective     Vitals:   Temp (24hrs), Av.3 °F (36.3 °C), Min:97.2 °F (36.2 °C), Max:97.4 °F (36.3 °C)    Temp:  [97.2 °F (36.2 °C)-97.4 °F (36.3 °C)] 97.2 °F (36.2 °C)  HR:  [72-74] 72  Resp:  [21-24] 24  BP: (127-138)/(91-96) 133/95  SpO2:  [94 %-97 %] 97 %  Body mass index is 30.56 kg/m².     Input and Output Summary (last 24 hours):     Intake/Output Summary (Last 24 hours) at 2024 1530  Last data filed at 2024 1438  Gross per 24 hour   Intake 360 ml   Output 2282 ml   Net -1922 ml       Physical Exam  Vitals reviewed.   Constitutional:       Appearance: Normal appearance.   HENT:      Head: Atraumatic.      Mouth/Throat:      Mouth: Mucous membranes are dry.   Cardiovascular:      Heart sounds: Normal heart sounds.   Pulmonary:      Effort: No respiratory distress.      Comments: crackles  Abdominal:      General: Bowel sounds are normal.   Skin:     General: Skin is dry.      Capillary Refill: Capillary refill takes less than 2 seconds.   Neurological:      Mental Status: He is alert and oriented to person, place, and time.      Motor: Weakness present.          Lines/Drains:  Lines/Drains/Airways       Active Status       Name Placement date Placement time Site Days    External Urinary Catheter Small 24  -- 1                      Telemetry:  Telemetry Orders (From admission, onward)               24 Hour Telemetry Monitoring  Continuous x 24 Hours (Telem)        Question:  Reason for 24 Hour Telemetry  Answer:  TIA/Suspected CVA/ Confirmed CVA                     Telemetry Reviewed:  paced  Indication for Continued Telemetry Use: Acute CVA               Lab Results: I have reviewed the following results: CBC/BMP:   .     24  0417   WBC 9.53   HGB 15.0   HCT 47.3      SODIUM 138   K 3.9      CO2 25   BUN 45*   CREATININE 2.29*   GLUC 144*   MG 2.5    , Creatinine Clearance: Estimated Creatinine Clearance: 33.5 mL/min (A) (by C-G formula based on SCr of 2.29  mg/dL (H)).   Results from last 7 days   Lab Units 09/21/24  0417   WBC Thousand/uL 9.53   HEMOGLOBIN g/dL 15.0   HEMATOCRIT % 47.3   PLATELETS Thousands/uL 280   SEGS PCT % 70   LYMPHO PCT % 14   MONO PCT % 10   EOS PCT % 4     Results from last 7 days   Lab Units 09/21/24  0417 09/20/24  0442 09/19/24  0402   SODIUM mmol/L 138   < > 142   POTASSIUM mmol/L 3.9   < > 3.8   CHLORIDE mmol/L 103   < > 105   CO2 mmol/L 25   < > 28   BUN mg/dL 45*   < > 40*   CREATININE mg/dL 2.29*   < > 2.30*   ANION GAP mmol/L 10   < > 9   CALCIUM mg/dL 9.6   < > 9.1   ALBUMIN g/dL  --   --  3.6   TOTAL BILIRUBIN mg/dL  --   --  1.78*   ALK PHOS U/L  --   --  69   ALT U/L  --   --  14   AST U/L  --   --  18   GLUCOSE RANDOM mg/dL 144*   < > 130    < > = values in this interval not displayed.     Results from last 7 days   Lab Units 09/18/24  0925   INR  1.72*     Results from last 7 days   Lab Units 09/21/24  1103 09/21/24  0800 09/20/24  1635 09/20/24  1041 09/20/24  0833 09/19/24  1555 09/19/24  1147 09/19/24  0826 09/18/24  1718 09/18/24  0931   POC GLUCOSE mg/dl 214* 147* 104 282* 108 149* 158* 120 102 224*     Results from last 7 days   Lab Units 09/19/24  0402 09/18/24  0925   HEMOGLOBIN A1C % 7.3* 7.4*           Recent Cultures (last 7 days):         Imaging Review: Reviewed radiology reports from this admission including: MRI brain.  Other Studies: No additional pertinent studies reviewed.    Last 24 Hours Medication List:     Current Facility-Administered Medications:     acetaminophen (TYLENOL) tablet 650 mg, Q6H PRN    albuterol (PROVENTIL HFA,VENTOLIN HFA) inhaler 2 puff, Q6H PRN    allopurinol (ZYLOPRIM) tablet 50 mg, Every Other Day    aspirin chewable tablet 81 mg, Daily    atorvastatin (LIPITOR) tablet 40 mg, QPM    docusate sodium (COLACE) capsule 100 mg, BID    Fluticasone Furoate-Vilanterol 100-25 mcg/actuation 1 puff, Daily    furosemide (LASIX) injection 40 mg, BID (diuretic)    guaiFENesin (MUCINEX) 12 hr tablet  600 mg, Q12H SUSAN    insulin lispro (HumALOG/ADMELOG) 100 units/mL subcutaneous injection 1-5 Units, TID AC **AND** Fingerstick Glucose (POCT), TID AC    levalbuterol (XOPENEX) inhalation solution 1.25 mg, TID    metoprolol succinate (TOPROL-XL) 24 hr tablet 50 mg, Daily    pantoprazole (PROTONIX) EC tablet 40 mg, Daily    rivaroxaban (XARELTO) tablet 15 mg, Daily With Breakfast    sodium chloride (OCEAN) 0.65 % nasal spray 2 spray, Q1H PRN    sodium chloride 3 % inhalation solution 4 mL, Q6H    spironolactone (ALDACTONE) tablet 25 mg, Daily    tamsulosin (FLOMAX) capsule 0.8 mg, Daily With Dinner    Administrative Statements   Today, Patient Was Seen By: Linda Moran MD  I have spent a total time of 39 minutes in caring for this patient on the day of the visit/encounter including .    **Please Note: This note may have been constructed using a voice recognition system.**

## 2024-09-21 NOTE — ASSESSMENT & PLAN NOTE
"/95 (BP Location: Left arm)   Pulse 72   Temp (!) 97.2 °F (36.2 °C) (Oral)   Resp (!) 24   Ht 5' 11\" (1.803 m)   Wt 99.4 kg (219 lb 1.6 oz)   SpO2 97%   BMI 30.56 kg/m²     Hold home regimen of amlodipine, losartan, hydralazine and torsemide  Continue with IV diuretics  Resumed Toprol-XL on 9/19  Aldactone 25 mg daily added 9/20   Vitals as per unit protocol  "

## 2024-09-21 NOTE — ASSESSMENT & PLAN NOTE
Lab Results   Component Value Date    EGFR 26 09/21/2024    EGFR 29 09/20/2024    EGFR 26 09/19/2024    CREATININE 2.29 (H) 09/21/2024    CREATININE 2.14 (H) 09/20/2024    CREATININE 2.30 (H) 09/19/2024   Presented with JOSH on CKD, likely cardiorenal syndrome.  Creatinine stable on diuretic therapy.

## 2024-09-21 NOTE — CASE MANAGEMENT
Case Management Discharge Planning Note    Patient name Dangelo Cooper  Location /-01 MRN [id number]  : 1950 Date 2024       Current Admission Date: 2024  Current Admission Diagnosis:Acute CVA (cerebrovascular accident) ([facility])   Patient Active Problem List    Diagnosis Date Noted Date Diagnosed    Acute CVA (cerebrovascular accident) ([facility]) 2024     A-fib ([facility]) 2024     Non-ischemic myocardial injury (non-traumatic) 2024     Lumbar radiculopathy 2024     Sore throat 09/10/2024     COVID-19 2024     Polyneuropathy associated with underlying disease ([facility]) 2022     Stage 4 chronic kidney disease due to type 2 diabetes mellitus ([facility]) 2022     Ascending aorta dilatation ([facility]) 2022     Obesity, morbid ([facility]) 2022     Secondary hyperparathyroidism ([facility]) 2022     Pacemaker 2021     BMI 35.0-35.9,adult 2021     Chest pain 10/31/2021     Hypoxia 10/31/2021     Essential hypertension 2021     Acute HFrEF (heart failure with reduced ejection fraction) ([facility]) 2021     CKD (chronic kidney disease) stage 4, GFR 15-29 ml/min ([facility]) 2021     Obstructive sleep apnea 2021     NSTEMI (non-ST elevated myocardial infarction) ([facility]) 2017     Chronic obstructive pulmonary disease (COPD) ([facility]) 2017     Right sided weakness 2017     Hypertensive emergency 2017     Paroxysmal atrial fibrillation ([facility]) 2017     Type 2 diabetes mellitus, without long-term current use of insulin ([facility]) 2017       LOS (days): 3  Geometric Mean LOS (GMLOS) (days): 4.6  Days to GMLOS:1.7     OBJECTIVE:  Risk of Unplanned Readmission Score: 16.55         Current admission status: Inpatient   Preferred Pharmacy:   [facility] [address] [place] [address] - [address] [facility]  [address] [facility] [address]  Phone: [phone] Fax: [phone]    Primary Care Provider: Ada Benites,  DO    Primary Insurance: [facility] REP  Secondary Insurance:     DISCHARGE DETAILS:     Met with Pt at bedside to review HHA agency options.   [facility] to provide [facility].   Pt chose [facility].   CM reserved [facility] in [place] and sent them a message with confirmation.   Pt's wife will provide transport to home.

## 2024-09-21 NOTE — ASSESSMENT & PLAN NOTE
History of SSS requiring PPM implant. He is EF dropped to 40% presumably due to predominant RV pacing. Upgraded to BiV device in 2022.  Appropriately functioning on telemetry.  Ejection fraction noted to be reduced compared to prior, which could be multifactorial with severity of aortic stenosis and mitral regurgitation along with continued ventricular pacing

## 2024-09-22 VITALS
HEIGHT: 71 IN | SYSTOLIC BLOOD PRESSURE: 115 MMHG | BODY MASS INDEX: 30.46 KG/M2 | TEMPERATURE: 97.6 F | WEIGHT: 217.6 LBS | OXYGEN SATURATION: 95 % | HEART RATE: 70 BPM | RESPIRATION RATE: 22 BRPM | DIASTOLIC BLOOD PRESSURE: 83 MMHG

## 2024-09-22 PROBLEM — R09.02 HYPOXIA: Status: RESOLVED | Noted: 2021-10-31 | Resolved: 2024-09-22

## 2024-09-22 PROBLEM — I35.0 NONRHEUMATIC AORTIC VALVE STENOSIS: Status: ACTIVE | Noted: 2024-09-22

## 2024-09-22 LAB
ANION GAP SERPL CALCULATED.3IONS-SCNC: 10 MMOL/L (ref 4–13)
BASOPHILS # BLD AUTO: 0.04 THOUSANDS/ΜL (ref 0–0.1)
BASOPHILS NFR BLD AUTO: 1 % (ref 0–1)
BUN SERPL-MCNC: 48 MG/DL (ref 5–25)
CALCIUM SERPL-MCNC: 9.2 MG/DL (ref 8.4–10.2)
CHLORIDE SERPL-SCNC: 102 MMOL/L (ref 96–108)
CO2 SERPL-SCNC: 27 MMOL/L (ref 21–32)
CREAT SERPL-MCNC: 2.5 MG/DL (ref 0.6–1.3)
EOSINOPHIL # BLD AUTO: 0.32 THOUSAND/ΜL (ref 0–0.61)
EOSINOPHIL NFR BLD AUTO: 4 % (ref 0–6)
ERYTHROCYTE [DISTWIDTH] IN BLOOD BY AUTOMATED COUNT: 17.9 % (ref 11.6–15.1)
GFR SERPL CREATININE-BSD FRML MDRD: 24 ML/MIN/1.73SQ M
GLUCOSE SERPL-MCNC: 113 MG/DL (ref 65–140)
GLUCOSE SERPL-MCNC: 116 MG/DL (ref 65–140)
GLUCOSE SERPL-MCNC: 164 MG/DL (ref 65–140)
HCT VFR BLD AUTO: 46.6 % (ref 36.5–49.3)
HGB BLD-MCNC: 15 G/DL (ref 12–17)
IMM GRANULOCYTES # BLD AUTO: 0.08 THOUSAND/UL (ref 0–0.2)
IMM GRANULOCYTES NFR BLD AUTO: 1 % (ref 0–2)
LYMPHOCYTES # BLD AUTO: 1.21 THOUSANDS/ΜL (ref 0.6–4.47)
LYMPHOCYTES NFR BLD AUTO: 15 % (ref 14–44)
MCH RBC QN AUTO: 28.1 PG (ref 26.8–34.3)
MCHC RBC AUTO-ENTMCNC: 32.2 G/DL (ref 31.4–37.4)
MCV RBC AUTO: 87 FL (ref 82–98)
MONOCYTES # BLD AUTO: 0.83 THOUSAND/ΜL (ref 0.17–1.22)
MONOCYTES NFR BLD AUTO: 11 % (ref 4–12)
NEUTROPHILS # BLD AUTO: 5.36 THOUSANDS/ΜL (ref 1.85–7.62)
NEUTS SEG NFR BLD AUTO: 68 % (ref 43–75)
NRBC BLD AUTO-RTO: 0 /100 WBCS
PLATELET # BLD AUTO: 293 THOUSANDS/UL (ref 149–390)
PMV BLD AUTO: 9.9 FL (ref 8.9–12.7)
POTASSIUM SERPL-SCNC: 3.7 MMOL/L (ref 3.5–5.3)
RBC # BLD AUTO: 5.34 MILLION/UL (ref 3.88–5.62)
SODIUM SERPL-SCNC: 139 MMOL/L (ref 135–147)
WBC # BLD AUTO: 7.84 THOUSAND/UL (ref 4.31–10.16)

## 2024-09-22 PROCEDURE — 99239 HOSP IP/OBS DSCHRG MGMT >30: CPT | Performed by: INTERNAL MEDICINE

## 2024-09-22 PROCEDURE — 85025 COMPLETE CBC W/AUTO DIFF WBC: CPT | Performed by: INTERNAL MEDICINE

## 2024-09-22 PROCEDURE — 80048 BASIC METABOLIC PNL TOTAL CA: CPT | Performed by: INTERNAL MEDICINE

## 2024-09-22 PROCEDURE — 94640 AIRWAY INHALATION TREATMENT: CPT

## 2024-09-22 PROCEDURE — 82948 REAGENT STRIP/BLOOD GLUCOSE: CPT

## 2024-09-22 PROCEDURE — 94760 N-INVAS EAR/PLS OXIMETRY 1: CPT

## 2024-09-22 RX ORDER — TORSEMIDE 20 MG/1
40 TABLET ORAL DAILY
Qty: 90 TABLET | Refills: 0 | Status: SHIPPED | OUTPATIENT
Start: 2024-09-22

## 2024-09-22 RX ORDER — SPIRONOLACTONE 25 MG/1
25 TABLET ORAL DAILY
Qty: 60 TABLET | Refills: 2 | Status: SHIPPED | OUTPATIENT
Start: 2024-09-23

## 2024-09-22 RX ORDER — ATORVASTATIN CALCIUM 40 MG/1
40 TABLET, FILM COATED ORAL EVERY EVENING
Qty: 60 TABLET | Refills: 2 | Status: SHIPPED | OUTPATIENT
Start: 2024-09-22

## 2024-09-22 RX ORDER — GUAIFENESIN 600 MG/1
600 TABLET, EXTENDED RELEASE ORAL EVERY 12 HOURS SCHEDULED
Qty: 10 TABLET | Refills: 0 | Status: SHIPPED | OUTPATIENT
Start: 2024-09-22

## 2024-09-22 RX ADMIN — SPIRONOLACTONE 25 MG: 25 TABLET ORAL at 08:27

## 2024-09-22 RX ADMIN — DOCUSATE SODIUM 100 MG: 100 CAPSULE, LIQUID FILLED ORAL at 08:27

## 2024-09-22 RX ADMIN — GUAIFENESIN 600 MG: 600 TABLET ORAL at 08:27

## 2024-09-22 RX ADMIN — FLUTICASONE FUROATE AND VILANTEROL TRIFENATATE 1 PUFF: 100; 25 POWDER RESPIRATORY (INHALATION) at 08:30

## 2024-09-22 RX ADMIN — PANTOPRAZOLE SODIUM 40 MG: 40 TABLET, DELAYED RELEASE ORAL at 08:27

## 2024-09-22 RX ADMIN — LEVALBUTEROL HYDROCHLORIDE 1.25 MG: 1.25 SOLUTION RESPIRATORY (INHALATION) at 07:45

## 2024-09-22 RX ADMIN — INSULIN LISPRO 1 UNITS: 100 INJECTION, SOLUTION INTRAVENOUS; SUBCUTANEOUS at 12:03

## 2024-09-22 RX ADMIN — METOPROLOL SUCCINATE 50 MG: 50 TABLET, EXTENDED RELEASE ORAL at 08:27

## 2024-09-22 RX ADMIN — ASPIRIN 81 MG CHEWABLE TABLET 81 MG: 81 TABLET CHEWABLE at 08:27

## 2024-09-22 RX ADMIN — RIVAROXABAN 15 MG: 15 TABLET, FILM COATED ORAL at 08:27

## 2024-09-22 NOTE — ASSESSMENT & PLAN NOTE
">>ASSESSMENT AND PLAN FOR ESSENTIAL HYPERTENSION WRITTEN ON 9/22/2024 11:10 AM BY CAMMIE NAILS MD    /83 (BP Location: Left arm)   Pulse 70   Temp 97.6 °F (36.4 °C) (Oral)   Resp 22   Ht 5' 11\" (1.803 m)   Wt 98.7 kg (217 lb 9.6 oz)   SpO2 95%   BMI 30.35 kg/m²     Hold home regimen of amlodipine, losartan, hydralazine and torsemide  Continue with torsemide 40 mg daily  Resumed Toprol-XL on 9/19  Aldactone 25 mg daily added 9/20   Vitals as per unit protocol  "

## 2024-09-22 NOTE — DISCHARGE SUMMARY
Discharge Summary - Hospitalist   Name: Dangelo Cooper 73 y.o. 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    Evaluated by neurology, recommend continue with stroke pathway  MRI brain : Recent infarcts in the white matter of right frontal parietal junction and left frontal cortex and adjacent subcortical white matter.  Bilateral involvement suggest embolic phenomena.  Possible subacute infarct of left occipital subcortical region  Echo-EF 30%, severe global hypokinesis, moderate to severe AAS  Lipid panel and HbA1c reviewed  Continue with aspirin  Eliquis discontinued, started on Xarelto, as acute CVA considered Eliquis failure  Normotension/euglycemia  PT/OT-no rehab needs    A-fib ([facility])  With history of paroxysmal atrial fibrillation  Home regimen consists of Toprol XL 50 mg daily and Eliquis 5 mg twice daily  Eliquis replaced by Xarelto  Continue with Toprol-XL    Chronic obstructive pulmonary disease (COPD) ([facility])  Does not appear to be in exacerbation  On 2 L of oxygen secondary to volume overload, however on room air now  Continue with home bronchodilator therapy    Type 2 diabetes mellitus, without long-term current use of insulin ([facility])  Lab Results   Component  "Value Date    HGBA1C 7.3 (H) 09/19/2024       Recent Labs     09/21/24  1103 09/21/24  1619 09/22/24  0738 09/22/24  1055   POCGLU 214* 100 113 164*       Blood Sugar Average: Last 72 hrs:  (P) 150.5366884227881744    Current sugars within goal  Not on any medication  Outpatient PCP follow-up  Essential hypertension  /83 (BP Location: Left arm)   Pulse 70   Temp 97.6 °F (36.4 °C) (Oral)   Resp 22   Ht 5' 11\" (1.803 m)   Wt 98.7 kg (217 lb 9.6 oz)   SpO2 95%   BMI 30.35 kg/m²     Hold home regimen of amlodipine, losartan, hydralazine and torsemide  Continue with torsemide 40 mg daily  Resumed Toprol-XL on 9/19  Aldactone 25 mg daily added 9/20   Vitals as per unit protocol  Acute HFrEF (heart failure with reduced ejection fraction) ([facility])  Wt Readings from Last 3 Encounters:   09/22/24 98.7 kg (217 lb 9.6 oz)   09/10/24 108 kg (237 lb 6.4 oz)   10/27/23 119 kg (261 lb 12.8 oz)     Chest x-ray with pulmonary venous congestion   BNP-1532    Home regimen torsemide 40 mg daily, recently decreased to 20 mg daily  Patient reports compliance with regimen    Will continue with IV Lasix 40 twice daily, continue for another 24-48 hours   Daily weights and strict I's and O's  Salt restriction  Echo with EF of 30%, with moderate to severe AS, global hypokinesis  Life vest fitted prior to discharge  Continue with torsemide 40 mg daily  Continue with Aldactone 25 mg daily  Jardiance 10 mg daily, to be added outpatient  Outpatient cardiology follow-up        CKD (chronic kidney disease) stage 4, GFR 15-29 ml/min ([facility])  Lab Results   Component Value Date    EGFR 26 09/21/2024    EGFR 29 09/20/2024    EGFR 26 09/19/2024    CREATININE 2.29 (H) 09/21/2024    CREATININE 2.14 (H) 09/20/2024    CREATININE 2.30 (H) 09/19/2024     Urinary retention protocol  Monitor BUN/creatinine while on IV diuretics  Losartan discontinued    Obstructive sleep apnea  Continue with CPAP   Hypoxia (Resolved: 9/22/2024)  Not on oxygen at " baseline  On 2 L at the time of admission  Now on room air  CXR with pulmonary venous congestion   Continue with IV diuretics  Respiratory protocol  Pacemaker  noted  Non-ischemic myocardial injury (non-traumatic)  Lab Results   Component Value Date    HSTNI0 67 (H) 09/18/2024    HSTNI2 66 (H) 09/18/2024    HSTNID2 -1 09/18/2024      Secondary to acute heart failure  Denies any chest pain  Continue with telemetry monitoring  Outpatient cardiology follow-up  Lumbar radiculopathy  CT with - Diffuse lumbar spondylitic degenerative change with annular bulging, osteophyte formation and annular calcifications. Multilevel moderate canal stenosis and foraminal narrowing from L2-3 through L5-S1.  At L5-S1 foraminal narrowing is more severe. At L3-4 and L4-5 there is moderate bilateral foraminal narrowing including mass effect upon the exiting nerves, worst at the L3-4 level    Discussed with neurosurgery, recommended getting MRI lumbar spine that shows Multilevel lumbar spondylosis, as described above, contributing to at most severe canal stenosis at L3-L4, and multilevel neural foraminal stenosis, worst on the left at L3-L4 on the right at L4-S1.   Patient denies any red flag symptoms  Continue with conservative management for now  Nonrheumatic aortic valve stenosis  As seen on the echo  Outpatient cardiology evaluation     Admission Date: 9/18/2024 0852  Discharge Date: 09/22/24  Admitting Diagnosis: Left leg weakness [R29.898]  Lumbar herniated disc [M51.26]  CHF, acute (HCC) [I50.9]  Weakness of extremity [R29.898]  Discharge Diagnosis:   Medical Problems       Resolved Problems  Date Reviewed: 9/21/2024            Resolved    Hypoxia 9/22/2024     Resolved by  Linda Moran MD          Procedures Performed:   Orders Placed This Encounter   Procedures    ED ECG Documentation Only       Summary of Hospital Course: Patient with multiple cardiovascular comorbidities presented with left lower extremity weakness/strokelike  symptoms, acute hypoxic respiratory failure and acute on chronic HFrEF.  Throughout the hospital stay, he remained hemodynamically stable.  He was closely followed by cardiology and neurology.  Stroke workup revealed recent infarct involving right frontal parietal region, left frontal cortex and possibly left occipital region.  The stroke was thought to be embolic in nature given Eliquis failure.  Therefore patient was transitioned to Xarelto.  He will continue with aspirin, and statin as well.  Echo revealed new onset cardiomyopathy and moderate to severe AAS.  He was given IV diuretics throughout the inpatient stay, with clinical improvement.  He is being discharged on torsemide 40 mg daily, Aldactone 25 mg daily.  He was fitted for LifeVest, given EF 30% prior to discharge.  Patient will have outpatient cardiology follow-up with his primary cardiologist.  Losartan, hydralazine have been discontinued.  Plan is also to start him on Jardiance outpatient.    Discharge plan was discussed in detail with patient and patient's family.  All questions were answered to satisfaction.        Significant Findings, Care, Treatment and Services Provided: see above     Complications: none    Condition at Discharge: stable       Discharge instructions/Information to patient and family:   See After Visit Summary (AVS) for information provided to patient and family.      Provisions for Follow-Up Care:  See after visit summary for information related to follow-up care and any pertinent home health orders.      PCP: Ada Beintes DO    Disposition: Home with [facility]     Planned Readmission: No     Discharge Medications:  See after visit summary for reconciled discharge medications provided to patient and family.      Discharge Statement:  I have spent a total time of 36 minutes in caring for this patient on the day of the visit/encounter. >30 minutes of time was spent on: Prognosis, Risks and benefits of tx options, Patient and  family education, Impressions, Counseling / Coordination of care, Documenting in the medical record, and Reviewing / ordering tests, medicine, procedures  .

## 2024-09-22 NOTE — ASSESSMENT & PLAN NOTE
Wt Readings from Last 3 Encounters:   09/22/24 98.7 kg (217 lb 9.6 oz)   09/10/24 108 kg (237 lb 6.4 oz)   10/27/23 119 kg (261 lb 12.8 oz)     Chest x-ray with pulmonary venous congestion   BNP-1532    Home regimen torsemide 40 mg daily, recently decreased to 20 mg daily  Patient reports compliance with regimen    Will continue with IV Lasix 40 twice daily, continue for another 24-48 hours   Daily weights and strict I's and O's  Salt restriction  Echo with EF of 30%, with moderate to severe AS, global hypokinesis  Life vest fitted prior to discharge  Continue with torsemide 40 mg daily  Continue with Aldactone 25 mg daily  Jardiance 10 mg daily, to be added outpatient  Outpatient cardiology follow-up

## 2024-09-22 NOTE — ASSESSMENT & PLAN NOTE
Lab Results   Component Value Date    EGFR 26 09/21/2024    EGFR 29 09/20/2024    EGFR 26 09/19/2024    CREATININE 2.29 (H) 09/21/2024    CREATININE 2.14 (H) 09/20/2024    CREATININE 2.30 (H) 09/19/2024     Urinary retention protocol  Monitor BUN/creatinine while on IV diuretics  Losartan discontinued

## 2024-09-22 NOTE — DISCHARGE INSTR - AVS FIRST PAGE
Please see your PCP/family physician within 7-10 days of discharge to discuss recent hospitalization  Please follow-up with your cardiologist  Please stop taking Eliquis, start taking Xarelto 15 mg daily  Please continue to use LifeVest as directed  Please follow-up with neurology

## 2024-09-22 NOTE — ASSESSMENT & PLAN NOTE
"/83 (BP Location: Left arm)   Pulse 70   Temp 97.6 °F (36.4 °C) (Oral)   Resp 22   Ht 5' 11\" (1.803 m)   Wt 98.7 kg (217 lb 9.6 oz)   SpO2 95%   BMI 30.35 kg/m²     Hold home regimen of amlodipine, losartan, hydralazine and torsemide  Continue with torsemide 40 mg daily  Resumed Toprol-XL on 9/19  Aldactone 25 mg daily added 9/20   Vitals as per unit protocol  "

## 2024-09-22 NOTE — ASSESSMENT & PLAN NOTE
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    Evaluated by neurology, recommend continue with stroke pathway  MRI brain : Recent infarcts in the white matter of right frontal parietal junction and left frontal cortex and adjacent subcortical white matter.  Bilateral involvement suggest embolic phenomena.  Possible subacute infarct of left occipital subcortical region  Echo-EF 30%, severe global hypokinesis, moderate to severe AAS  Lipid panel and HbA1c reviewed  Continue with aspirin  Eliquis discontinued, started on Xarelto, as acute CVA considered Eliquis failure  Normotension/euglycemia  PT/OT-no rehab needs

## 2024-09-22 NOTE — ASSESSMENT & PLAN NOTE
Lab Results   Component Value Date    HGBA1C 7.3 (H) 09/19/2024       Recent Labs     09/21/24  1103 09/21/24  1619 09/22/24  0738 09/22/24  1055   POCGLU 214* 100 113 164*       Blood Sugar Average: Last 72 hrs:  (P) 150.2374934846937051    Current sugars within goal  Not on any medication  Outpatient PCP follow-up

## 2024-09-22 NOTE — PLAN OF CARE
Problem: Neurological Deficit  Goal: Neurological status is stable or improving  Description: Interventions:  - Monitor and assess patient's level of consciousness, motor function, sensory function, and level of assistance needed for ADLs.   - Monitor and report changes from baseline. Collaborate with interdisciplinary team to initiate plan and implement interventions as ordered.   - Provide and maintain a safe environment.  - Consider seizure precautions.  - Consider fall precautions.  - Consider aspiration precautions.  - Consider bleeding precautions.  Outcome: Progressing     Problem: Prexisting or High Potential for Compromised Skin Integrity  Goal: Skin integrity is maintained or improved  Description: INTERVENTIONS:  - Identify patients at risk for skin breakdown  - Assess and monitor skin integrity  - Assess and monitor nutrition and hydration status  - Monitor labs   - Assess for incontinence   - Turn and reposition patient  - Assist with mobility/ambulation  - Relieve pressure over bony prominences  - Avoid friction and shearing  - Provide appropriate hygiene as needed including keeping skin clean and dry  - Evaluate need for skin moisturizer/barrier cream  - Collaborate with interdisciplinary team   - Patient/family teaching  - Consider wound care consult   Outcome: Progressing     Problem: SAFETY ADULT  Goal: Patient will remain free of falls  Description: INTERVENTIONS:  - Educate patient/family on patient safety including physical limitations  - Instruct patient to call for assistance with activity   - Consult OT/PT to assist with strengthening/mobility   - Keep Call bell within reach  - Keep bed low and locked with side rails adjusted as appropriate  - Keep care items and personal belongings within reach  - Initiate and maintain comfort rounds  - Make Fall Risk Sign visible to staff  - Offer Toileting every 2 Hours, in advance of need  - Initiate/Maintain bed/chair alarm  - Obtain necessary fall  risk management equipment: yellow bracelet, yellow socks, bed/chair alarm  - Apply yellow socks and bracelet for high fall risk patients  - Consider moving patient to room near nurses station  Outcome: Progressing

## 2024-09-22 NOTE — ASSESSMENT & PLAN NOTE
Does not appear to be in exacerbation  On 2 L of oxygen secondary to volume overload, however on room air now  Continue with home bronchodilator therapy

## 2024-09-22 NOTE — ASSESSMENT & PLAN NOTE
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    Evaluated by neurology, recommend continue with stroke pathway  MRI brain : Recent infarcts in the white matter of right frontal parietal junction and left frontal cortex and adjacent subcortical white matter.  Bilateral involvement suggest embolic phenomena.  Possible subacute infarct of left occipital subcortical region  Echo-EF 30%, severe global hypokinesis, moderate to severe AAS  Lipid panel and HbA1c reviewed  Continue with aspirin  Eliquis discontinued, started on Xarelto, as acute CVA considered Eliquis failure  Normotension/euglycemia  PT/OT-no rehab needs

## 2024-09-22 NOTE — ASSESSMENT & PLAN NOTE
Lab Results   Component Value Date    HSTNI0 67 (H) 09/18/2024    HSTNI2 66 (H) 09/18/2024    HSTNID2 -1 09/18/2024      Secondary to acute heart failure  Denies any chest pain  Continue with telemetry monitoring  Outpatient cardiology follow-up

## 2024-09-23 ENCOUNTER — TRANSITIONAL CARE MANAGEMENT (OUTPATIENT)
Dept: FAMILY MEDICINE CLINIC | Facility: CLINIC | Age: 74
End: 2024-09-23

## 2024-09-23 ENCOUNTER — HOME CARE VISIT (OUTPATIENT)
Dept: HOME HEALTH SERVICES | Facility: HOME HEALTHCARE | Age: 74
End: 2024-09-23
Payer: COMMERCIAL

## 2024-09-23 ENCOUNTER — TELEPHONE (OUTPATIENT)
Dept: FAMILY MEDICINE CLINIC | Facility: CLINIC | Age: 74
End: 2024-09-23

## 2024-09-23 VITALS
SYSTOLIC BLOOD PRESSURE: 120 MMHG | HEIGHT: 71 IN | BODY MASS INDEX: 31.5 KG/M2 | WEIGHT: 225 LBS | DIASTOLIC BLOOD PRESSURE: 84 MMHG | TEMPERATURE: 97.6 F | OXYGEN SATURATION: 95 % | HEART RATE: 84 BPM | RESPIRATION RATE: 20 BRPM

## 2024-09-23 PROCEDURE — G0299 HHS/HOSPICE OF RN EA 15 MIN: HCPCS

## 2024-09-23 PROCEDURE — 400013 VN SOC

## 2024-09-23 NOTE — UTILIZATION REVIEW
NOTIFICATION OF ADMISSION DISCHARGE   This is a Notification of Discharge from [facility]. Please be advised that this patient has been discharge from our facility. Below you will find the admission and discharge date and time including the patient’s disposition.   UTILIZATION REVIEW CONTACT:  Peri Ta  Utilization   Network Utilization Review Department  Phone: [phone] x carefully listen to the prompts. All voicemails are confidential.  Email: [email or web address]     ADMISSION INFORMATION  PRESENTATION DATE: 9/18/2024  8:52 AM  OBERVATION ADMISSION DATE: N/A  INPATIENT ADMISSION DATE: 9/18/24 11:23 AM   DISCHARGE DATE: 9/22/2024  1:16 PM   DISPOSITION:Home with Home Health Care    Network Utilization Review Department  ATTENTION: Please call with any questions or concerns to [phone] and carefully listen to the prompts so that you are directed to the right person. All voicemails are confidential.   For Discharge needs, contact Care Management DC Support Team at [phone] opt. [phone]  Send all requests for admission clinical reviews, approved or denied determinations and any other requests to dedicated fax number below belonging to the campus where the patient is receiving treatment. List of dedicated fax numbers for the Facilities:  FACILITY NAME UR FAX NUMBER   ADMISSION DENIALS (Administrative/Medical Necessity) [phone]   DISCHARGE SUPPORT TEAM ([facility]) [phone]   PARENT CHILD HEALTH (Maternity/NICU/Pediatrics) [phone]   [facility] [phone]   [facility] [phone]   [facility] [phone]   [facility] [phone]   [facility] [phone]   [facility] [phone]   [facility] [phone]   [facility]  [phone]   [facility] [phone]   [facility] [phone]   [facility] [phone]   [facility] [phone]

## 2024-09-23 NOTE — TELEPHONE ENCOUNTER
I left message with patient that we scheduled a follow up from the hospital Thursday 9/26 at 1 PM with Dr. Benites. I asked patient to call back and verify if he can make this visit.Also I asked patient to call back and verify medications new and old

## 2024-09-24 ENCOUNTER — RA CDI HCC (OUTPATIENT)
Dept: OTHER | Facility: HOSPITAL | Age: 74
End: 2024-09-24

## 2024-09-24 ENCOUNTER — TELEPHONE (OUTPATIENT)
Dept: FAMILY MEDICINE CLINIC | Facility: CLINIC | Age: 74
End: 2024-09-24

## 2024-09-24 DIAGNOSIS — I13.0 HYPERTENSIVE HEART AND CHRONIC KIDNEY DISEASE WITH HEART FAILURE AND STAGE 1 THROUGH STAGE 4 CHRONIC KIDNEY DISEASE, OR CHRONIC KIDNEY DISEASE (HCC): ICD-10-CM

## 2024-09-24 DIAGNOSIS — I25.2 OLD MYOCARDIAL INFARCTION: Primary | ICD-10-CM

## 2024-09-24 DIAGNOSIS — Z86.73 HISTORY OF CVA (CEREBROVASCULAR ACCIDENT): ICD-10-CM

## 2024-09-24 PROBLEM — E66.01 OBESITY, MORBID (HCC): Status: RESOLVED | Noted: 2022-06-06 | Resolved: 2024-09-24

## 2024-09-24 NOTE — PROGRESS NOTES
HCC coding opportunities          Chart Reviewed number of suggestions sent to Provider: 2  E11.22  E11.36  I13.0     Patients Insurance     Medicare Insurance: [employer]

## 2024-09-25 ENCOUNTER — HOME CARE VISIT (OUTPATIENT)
Dept: HOME HEALTH SERVICES | Facility: HOME HEALTHCARE | Age: 74
End: 2024-09-25
Payer: COMMERCIAL

## 2024-09-25 VITALS — SYSTOLIC BLOOD PRESSURE: 128 MMHG | HEART RATE: 87 BPM | OXYGEN SATURATION: 98 % | DIASTOLIC BLOOD PRESSURE: 72 MMHG

## 2024-09-25 VITALS
RESPIRATION RATE: 20 BRPM | WEIGHT: 218 LBS | OXYGEN SATURATION: 97 % | BODY MASS INDEX: 30.4 KG/M2 | HEART RATE: 72 BPM | SYSTOLIC BLOOD PRESSURE: 106 MMHG | DIASTOLIC BLOOD PRESSURE: 60 MMHG | TEMPERATURE: 97.2 F

## 2024-09-25 PROCEDURE — G0299 HHS/HOSPICE OF RN EA 15 MIN: HCPCS

## 2024-09-25 PROCEDURE — G0151 HHCP-SERV OF PT,EA 15 MIN: HCPCS

## 2024-09-25 NOTE — TELEPHONE ENCOUNTER
1ST ATTEMPT,     Called pt no answer, LMOM.    Thank you,     Kika     U/ MAMI UPPER BUCKS/ Stroke-like symptoms      DC- HOME- 9/22/2024    ----- Message from Iris Bey PA-C sent at 9/20/2024 12:27 PM EDT -----  Dangelo Cooper will need follow-up in in 6 weeks with neurovascular team for Other= ATTENDING ONLY  (embolic stroke) in 60 minute appointment. They will not require outpatient neurological testing at this time.

## 2024-09-26 ENCOUNTER — OFFICE VISIT (OUTPATIENT)
Dept: FAMILY MEDICINE CLINIC | Facility: CLINIC | Age: 74
End: 2024-09-26
Payer: COMMERCIAL

## 2024-09-26 VITALS
RESPIRATION RATE: 20 BRPM | DIASTOLIC BLOOD PRESSURE: 78 MMHG | OXYGEN SATURATION: 99 % | HEART RATE: 88 BPM | SYSTOLIC BLOOD PRESSURE: 116 MMHG | HEIGHT: 71 IN | WEIGHT: 224.2 LBS | BODY MASS INDEX: 31.39 KG/M2 | TEMPERATURE: 97.5 F

## 2024-09-26 DIAGNOSIS — E11.22 STAGE 4 CHRONIC KIDNEY DISEASE DUE TO TYPE 2 DIABETES MELLITUS (HCC): ICD-10-CM

## 2024-09-26 DIAGNOSIS — M54.16 LUMBAR RADICULOPATHY: ICD-10-CM

## 2024-09-26 DIAGNOSIS — G63 POLYNEUROPATHY ASSOCIATED WITH UNDERLYING DISEASE (HCC): ICD-10-CM

## 2024-09-26 DIAGNOSIS — I50.21 ACUTE HFREF (HEART FAILURE WITH REDUCED EJECTION FRACTION) (HCC): Primary | ICD-10-CM

## 2024-09-26 DIAGNOSIS — J42 CHRONIC BRONCHITIS, UNSPECIFIED CHRONIC BRONCHITIS TYPE (HCC): ICD-10-CM

## 2024-09-26 DIAGNOSIS — I48.0 PAROXYSMAL ATRIAL FIBRILLATION (HCC): ICD-10-CM

## 2024-09-26 DIAGNOSIS — E11.69 TYPE 2 DIABETES MELLITUS WITH OTHER SPECIFIED COMPLICATION, WITHOUT LONG-TERM CURRENT USE OF INSULIN (HCC): ICD-10-CM

## 2024-09-26 DIAGNOSIS — N18.4 STAGE 4 CHRONIC KIDNEY DISEASE DUE TO TYPE 2 DIABETES MELLITUS (HCC): ICD-10-CM

## 2024-09-26 DIAGNOSIS — I77.810 ASCENDING AORTA DILATATION (HCC): ICD-10-CM

## 2024-09-26 DIAGNOSIS — Z86.73 HISTORY OF CVA (CEREBROVASCULAR ACCIDENT): ICD-10-CM

## 2024-09-26 DIAGNOSIS — N25.81 SECONDARY HYPERPARATHYROIDISM (HCC): ICD-10-CM

## 2024-09-26 PROCEDURE — 99496 TRANSJ CARE MGMT HIGH F2F 7D: CPT | Performed by: FAMILY MEDICINE

## 2024-09-26 NOTE — ASSESSMENT & PLAN NOTE
Dangelo was on Eliquis but now is on Xarelto  He will continue other medications for management  Follow up with neurology as planned  Reports he is back to baseline today      He has home PT and home services  Good support with his family as well

## 2024-09-26 NOTE — ASSESSMENT & PLAN NOTE
Lab Results   Component Value Date    HGBA1C 7.3 (H) 09/19/2024     Diet controlled  No concerns today

## 2024-09-26 NOTE — ASSESSMENT & PLAN NOTE
Wt Readings from Last 3 Encounters:   09/26/24 102 kg (224 lb 3.2 oz)   09/25/24 98.9 kg (218 lb)   09/23/24 102 kg (225 lb)     Doing better since being home from the hospital  Reports symptoms are controlled currently  Continue medications as prescribed by cardiology   Patient declines wearing life vest which was prescribed at time of discharge from the hospital

## 2024-09-26 NOTE — ASSESSMENT & PLAN NOTE
Lab Results   Component Value Date    HGBA1C 7.3 (H) 09/19/2024     [facility]  Has upcoming appointment for follow up

## 2024-09-26 NOTE — ASSESSMENT & PLAN NOTE
Dangelo follows with [facility]  He is on medication per specialist  No changes were made today  He has follow up scheduled in October

## 2024-09-26 NOTE — PROGRESS NOTES
Assessment/Plan:    Problem List Items Addressed This Visit          Cardiovascular and Mediastinum    Paroxysmal atrial fibrillation (HCC)     Dangelo follows with [facility]  He is on medication per specialist  No changes were made today  He has follow up scheduled in October          Acute HFrEF (heart failure with reduced ejection fraction) (HCC) - Primary     Wt Readings from Last 3 Encounters:   09/26/24 102 kg (224 lb 3.2 oz)   09/25/24 98.9 kg (218 lb)   09/23/24 102 kg (225 lb)     Doing better since being home from the hospital  Reports symptoms are controlled currently  Continue medications as prescribed by cardiology   Patient declines wearing life vest which was prescribed at time of discharge from the hospital                Ascending aorta dilatation ([facility])     Follows with cardiology  No acute concerns today            Respiratory    Chronic obstructive pulmonary disease (COPD) ([facility])     On room air  Doing well              Endocrine    Type 2 diabetes mellitus, without long-term current use of insulin ([facility])       Lab Results   Component Value Date    HGBA1C 7.3 (H) 09/19/2024     Diet controlled  No concerns today           Stage 4 chronic kidney disease due to type 2 diabetes mellitus ([facility])       Lab Results   Component Value Date    HGBA1C 7.3 (H) 09/19/2024     [facility]  Has [facility] appointment for follow up         Secondary hyperparathyroidism ([facility])     Continue nephrology follow up            Nervous and Auditory    Polyneuropathy associated with underlying disease (HCC)     Stable no concerns today  Continue PT and supportive care          Lumbar radiculopathy       Neurology/Sleep    History of CVA (cerebrovascular accident)     Dangelo was on Eliquis but now is on Xarelto  He will continue other medications for management  Follow up with neurology as planned  Reports he is back to baseline today      He has home PT and home services  Good support with his  family as well           Chief Complaint   Patient presents with    Transition of Care Management       Subjective:    Patient ID:  Dangelo Cooper is a 73 y.o.male.    HPI  The following portions of the patient's history were reviewed and updated as appropriate: allergies, current medications, past family history, past medical history, past social history, past surgical history and problem list.  Patient Active Problem List   Diagnosis    Chronic obstructive pulmonary disease (COPD) (HCC)    Right sided weakness    Hypertensive emergency    Paroxysmal atrial fibrillation (HCC)    Type 2 diabetes mellitus, without long-term current use of insulin (HCC)    Acute HFrEF (heart failure with reduced ejection fraction) ([facility])    CKD (chronic kidney disease) stage 4, GFR 15-29 ml/min (HCC)    Obstructive sleep apnea    Chest pain    Pacemaker    Polyneuropathy associated with underlying disease ([facility])    Stage 4 chronic kidney disease due to type 2 diabetes mellitus (HCC)    Ascending aorta dilatation (HCC)    Secondary hyperparathyroidism (HCC)    COVID-19    Sore throat    A-fib ([facility])    Non-ischemic myocardial injury (non-traumatic)    Lumbar radiculopathy    Nonrheumatic aortic valve stenosis    Old myocardial infarction    History of CVA (cerebrovascular accident)    Hypertensive heart and chronic kidney disease with heart failure and stage 1 through stage 4 chronic kidney disease, or chronic kidney disease (HCC)     Past Medical History:   Diagnosis Date    A-fib (HCC)     Arthritis     CHF (congestive heart failure) (HCC)     Chronic kidney disease don't remember    COPD (chronic obstructive pulmonary disease) (HCC)     Diabetes mellitus (HCC)     Enlarged prostate     Gout     Heart murmur birth    Hyperlipidemia     Hypertension      Past Surgical History:   Procedure Laterality Date    A-V CARDIAC PACEMAKER INSERTION  08/2022    EAR SURGERY      EYE SURGERY  2021    cat. repaired    FRACTURE SURGERY        Allergies   Allergen Reactions    Bee Venom Anaphylaxis    Enalapril Other (See Comments)     unknown    Morphine GI Intolerance     Family History   Problem Relation Age of Onset    Dementia Mother     Diabetes Mother     Cancer Father      Social History     Socioeconomic History    Marital status: /Civil Union     Spouse name: Not on file    Number of children: Not on file    Years of education: Not on file    Highest education level: Not on file   Occupational History    Not on file   Tobacco Use    Smoking status: Former     Types: Pipe    Smokeless tobacco: Never   Vaping Use    Vaping status: Never Used   Substance and Sexual Activity    Alcohol use: Yes     Alcohol/week: 2.0 standard drinks of alcohol     Types: 1 Cans of beer, 1 Standard drinks or equivalent per week     Comment: occassional    Drug use: No    Sexual activity: Not Currently     Partners: Female   Other Topics Concern    Not on file   Social History Narrative    Not on file     Social Determinants of Health     Financial Resource Strain: Low Risk  (10/21/2023)    Overall Financial Resource Strain (CARDIA)     Difficulty of Paying Living Expenses: Not hard at all   Food Insecurity: No Food Insecurity (9/19/2024)    Hunger Vital Sign     Worried About Running Out of Food in the Last Year: Never true     Ran Out of Food in the Last Year: Never true   Transportation Needs: No Transportation Needs (9/19/2024)    PRAPARE - Transportation     Lack of Transportation (Medical): No     Lack of Transportation (Non-Medical): No   Physical Activity: Inactive (11/8/2021)    Exercise Vital Sign     Days of Exercise per Week: 0 days     Minutes of Exercise per Session: 0 min   Stress: No Stress Concern Present (11/8/2021)    [employer] [place] [employer] - Occupational Stress Questionnaire     Feeling of Stress : Not at all   Social Connections: Not on file   Intimate Partner Violence: Not At Risk (10/27/2023)    Humiliation, Afraid,  Rape, and Kick questionnaire     Fear of Current or Ex-Partner: No     Emotionally Abused: No     Physically Abused: No     Sexually Abused: No   Housing Stability: Low Risk  (9/19/2024)    Housing Stability Vital Sign     Unable to Pay for Housing in the Last Year: No     Number of Times Moved in the Last Year: 1     Homeless in the Last Year: No     Current Outpatient Medications on File Prior to Visit   Medication Sig Dispense Refill    albuterol (PROVENTIL HFA,VENTOLIN HFA) 90 mcg/act inhaler INHALE 2 PUFFS BY MOUTH FOUR TIMES A DAY AS NEEDED FOR BREATHING      allopurinol (ZYLOPRIM) 100 mg tablet Take 100 mg by mouth daily        aspirin 81 mg chewable tablet Chew 81 mg daily by mouth      atorvastatin (LIPITOR) 40 mg tablet Take 1 tablet (40 mg total) by mouth every evening 60 tablet 2    ferrous sulfate 325 (65 Fe) mg tablet Take 325 mg by mouth 2 (two) times a day with meals      fluticasone-salmeterol (Advair) 250-50 mcg/dose inhaler Inhale 1 puff 2 (two) times a day Rinse mouth after use.      folic acid (FOLVITE) 1 mg tablet Take 1 mg by mouth daily       guaiFENesin (MUCINEX) 600 mg 12 hr tablet Take 1 tablet (600 mg total) by mouth every 12 (twelve) hours 10 tablet 0    Lutein 20 MG CAPS Take 20 mg by mouth daily.      metoprolol succinate (TOPROL-XL) 50 mg 24 hr tablet Take 50 mg by mouth daily      pantoprazole (PROTONIX) 40 mg tablet Take 40 mg by mouth daily      rivaroxaban (XARELTO) 15 mg tablet Take 1 tablet (15 mg total) by mouth daily with breakfast 30 tablet 2    sodium chloride (OCEAN) 0.65 % nasal spray 1 spray into each nostril 2 (two) times a day as needed for congestion 88 mL 0    spironolactone (ALDACTONE) 25 mg tablet Take 1 tablet (25 mg total) by mouth daily 60 tablet 2    tamsulosin (FLOMAX) 0.4 mg Take 0.8 mg by mouth daily with dinner      torsemide (DEMADEX) 20 mg tablet Take 2 tablets (40 mg total) by mouth daily 90 tablet 0     No current facility-administered medications on  "file prior to visit.         Review of Systems   Constitutional:  Negative for chills and fever.   HENT:  Negative for congestion, postnasal drip, rhinorrhea and sinus pain.    Eyes:  Negative for photophobia and visual disturbance.   Respiratory:  Negative for cough and shortness of breath.    Cardiovascular:  Negative for chest pain, palpitations and leg swelling.   Gastrointestinal:  Negative for abdominal pain, constipation, diarrhea, nausea and vomiting.   Genitourinary:  Negative for difficulty urinating and dysuria.   Musculoskeletal:  Negative for arthralgias and myalgias.   Skin:  Negative for rash.   Neurological:  Negative for dizziness and syncope.       Objective:    Vitals:    09/26/24 1247   BP: 116/78   BP Location: Left arm   Patient Position: Sitting   Cuff Size: Large   Pulse: 88   Resp: 20   Temp: 97.5 °F (36.4 °C)   TempSrc: Tympanic   SpO2: 99%   Weight: 102 kg (224 lb 3.2 oz)   Height: 5' 11\" (1.803 m)     Physical Exam  Constitutional:       General: He is not in acute distress.     Appearance: Normal appearance. He is not ill-appearing, toxic-appearing or diaphoretic.   HENT:      Head: Normocephalic and atraumatic.      Right Ear: Tympanic membrane and ear canal normal.      Left Ear: Tympanic membrane and ear canal normal.      Nose: Nose normal. No congestion.      Mouth/Throat:      Mouth: Mucous membranes are dry.      Pharynx: Oropharynx is clear. No oropharyngeal exudate.   Eyes:      Extraocular Movements: Extraocular movements intact.      Conjunctiva/sclera: Conjunctivae normal.   Cardiovascular:      Rate and Rhythm: Normal rate and regular rhythm.      Pulses: Normal pulses.      Heart sounds: Murmur heard.   Pulmonary:      Effort: Pulmonary effort is normal.      Breath sounds: Normal breath sounds. No wheezing, rhonchi or rales.   Abdominal:      General: There is no distension.      Palpations: Abdomen is soft.      Tenderness: There is no abdominal tenderness. "   Musculoskeletal:         General: No tenderness. Normal range of motion.      Cervical back: Normal range of motion and neck supple.   Skin:     General: Skin is warm and dry.      Capillary Refill: Capillary refill takes less than 2 seconds.   Neurological:      General: No focal deficit present.      Mental Status: He is alert and oriented to person, place, and time.      Cranial Nerves: No cranial nerve deficit.   Psychiatric:         Mood and Affect: Mood normal.         Behavior: Behavior normal.         Thought Content: Thought content normal.           TCM Call       Date and time call was made  9/23/2024  5:05 PM    Hospital care reviewed  Records reviewed    Patient was hospitialized at  [facility]    Date of Admission  09/18/24    Date of discharge  09/22/24    Diagnosis  Acute CVA Accident    Disposition  Home    Were the patients medications reviewed and updated  Yes    Current Symptoms  None          TCM Call       Post hospital issues  None    Should patient be enrolled in anticoag monitoring?  No    Scheduled for follow up?  Yes    Patients specialists  Cardiologist    Cardiologist name       Did you obtain your prescribed medications  Yes    Do you need help managing your prescriptions or medications  No    Is transportation to your appointment needed  No    I have advised the patient to call PCP with any new or worsening symptoms  Savanah Ariza MA    Living Arrangements  Spouse or Significiant other    Support System  Family    The type of support provided  Emotional; Physical    Do you have social support  Yes, as much as I need    Are you recieving any outpatient services  No    Are you recieving home care services  No    Are you using any community resources  No    Current waiver services  No    Have you fallen in the last 12 months  No    Interperter language line needed  No    Counseling  Patient    Counseling topics  instructions for management; Importance of RX  compliance

## 2024-09-27 ENCOUNTER — HOME CARE VISIT (OUTPATIENT)
Dept: HOME HEALTH SERVICES | Facility: HOME HEALTHCARE | Age: 74
End: 2024-09-27
Payer: COMMERCIAL

## 2024-09-27 VITALS
RESPIRATION RATE: 18 BRPM | OXYGEN SATURATION: 99 % | TEMPERATURE: 96.2 F | HEART RATE: 62 BPM | SYSTOLIC BLOOD PRESSURE: 112 MMHG | DIASTOLIC BLOOD PRESSURE: 78 MMHG

## 2024-09-27 PROCEDURE — G0157 HHC PT ASSISTANT EA 15: HCPCS

## 2024-09-27 PROCEDURE — G0300 HHS/HOSPICE OF LPN EA 15 MIN: HCPCS

## 2024-09-29 VITALS
SYSTOLIC BLOOD PRESSURE: 120 MMHG | HEART RATE: 71 BPM | TEMPERATURE: 97.1 F | OXYGEN SATURATION: 95 % | DIASTOLIC BLOOD PRESSURE: 67 MMHG | RESPIRATION RATE: 16 BRPM

## 2024-09-30 ENCOUNTER — HOME CARE VISIT (OUTPATIENT)
Dept: HOME HEALTH SERVICES | Facility: HOME HEALTHCARE | Age: 74
End: 2024-09-30
Payer: COMMERCIAL

## 2024-09-30 VITALS
SYSTOLIC BLOOD PRESSURE: 130 MMHG | RESPIRATION RATE: 18 BRPM | HEART RATE: 78 BPM | OXYGEN SATURATION: 98 % | TEMPERATURE: 96.2 F | DIASTOLIC BLOOD PRESSURE: 84 MMHG

## 2024-09-30 PROCEDURE — G0300 HHS/HOSPICE OF LPN EA 15 MIN: HCPCS

## 2024-10-01 ENCOUNTER — HOME CARE VISIT (OUTPATIENT)
Dept: HOME HEALTH SERVICES | Facility: HOME HEALTHCARE | Age: 74
End: 2024-10-01
Payer: COMMERCIAL

## 2024-10-01 VITALS — DIASTOLIC BLOOD PRESSURE: 80 MMHG | OXYGEN SATURATION: 97 % | HEART RATE: 72 BPM | SYSTOLIC BLOOD PRESSURE: 140 MMHG

## 2024-10-01 PROCEDURE — G0151 HHCP-SERV OF PT,EA 15 MIN: HCPCS

## 2024-10-01 PROCEDURE — G0180 MD CERTIFICATION HHA PATIENT: HCPCS | Performed by: INTERNAL MEDICINE

## 2024-10-03 ENCOUNTER — HOME CARE VISIT (OUTPATIENT)
Dept: HOME HEALTH SERVICES | Facility: HOME HEALTHCARE | Age: 74
End: 2024-10-03
Payer: COMMERCIAL

## 2024-10-03 VITALS
TEMPERATURE: 97.6 F | OXYGEN SATURATION: 99 % | HEART RATE: 74 BPM | SYSTOLIC BLOOD PRESSURE: 112 MMHG | DIASTOLIC BLOOD PRESSURE: 70 MMHG | RESPIRATION RATE: 20 BRPM

## 2024-10-03 PROCEDURE — G0299 HHS/HOSPICE OF RN EA 15 MIN: HCPCS

## 2024-10-04 ENCOUNTER — HOME CARE VISIT (OUTPATIENT)
Dept: HOME HEALTH SERVICES | Facility: HOME HEALTHCARE | Age: 74
End: 2024-10-04
Payer: COMMERCIAL

## 2024-10-04 VITALS
TEMPERATURE: 96.9 F | DIASTOLIC BLOOD PRESSURE: 81 MMHG | HEART RATE: 82 BPM | OXYGEN SATURATION: 95 % | SYSTOLIC BLOOD PRESSURE: 116 MMHG | RESPIRATION RATE: 16 BRPM

## 2024-10-04 PROCEDURE — G0157 HHC PT ASSISTANT EA 15: HCPCS

## 2024-10-06 ENCOUNTER — HOME CARE VISIT (OUTPATIENT)
Dept: HOME HEALTH SERVICES | Facility: HOME HEALTHCARE | Age: 74
End: 2024-10-06
Payer: COMMERCIAL

## 2024-10-06 VITALS
TEMPERATURE: 96.6 F | DIASTOLIC BLOOD PRESSURE: 78 MMHG | HEART RATE: 79 BPM | BODY MASS INDEX: 30.4 KG/M2 | OXYGEN SATURATION: 96 % | WEIGHT: 218 LBS | SYSTOLIC BLOOD PRESSURE: 112 MMHG | RESPIRATION RATE: 16 BRPM

## 2024-10-06 PROCEDURE — G0157 HHC PT ASSISTANT EA 15: HCPCS

## 2024-10-07 ENCOUNTER — HOME CARE VISIT (OUTPATIENT)
Dept: HOME HEALTH SERVICES | Facility: HOME HEALTHCARE | Age: 74
End: 2024-10-07
Payer: COMMERCIAL

## 2024-10-08 ENCOUNTER — HOME CARE VISIT (OUTPATIENT)
Dept: HOME HEALTH SERVICES | Facility: HOME HEALTHCARE | Age: 74
End: 2024-10-08
Payer: COMMERCIAL

## 2024-10-08 PROCEDURE — G0151 HHCP-SERV OF PT,EA 15 MIN: HCPCS

## 2024-10-09 VITALS — OXYGEN SATURATION: 97 % | HEART RATE: 85 BPM | SYSTOLIC BLOOD PRESSURE: 134 MMHG | DIASTOLIC BLOOD PRESSURE: 70 MMHG

## 2024-10-10 ENCOUNTER — HOME CARE VISIT (OUTPATIENT)
Dept: HOME HEALTH SERVICES | Facility: HOME HEALTHCARE | Age: 74
End: 2024-10-10
Payer: COMMERCIAL

## 2024-10-10 VITALS
TEMPERATURE: 97.8 F | RESPIRATION RATE: 20 BRPM | DIASTOLIC BLOOD PRESSURE: 80 MMHG | WEIGHT: 217 LBS | OXYGEN SATURATION: 95 % | BODY MASS INDEX: 30.27 KG/M2 | HEART RATE: 82 BPM | SYSTOLIC BLOOD PRESSURE: 120 MMHG

## 2024-10-10 PROCEDURE — G0299 HHS/HOSPICE OF RN EA 15 MIN: HCPCS

## 2024-10-31 ENCOUNTER — OFFICE VISIT (OUTPATIENT)
Dept: FAMILY MEDICINE CLINIC | Facility: CLINIC | Age: 74
End: 2024-10-31
Payer: COMMERCIAL

## 2024-10-31 VITALS
TEMPERATURE: 97.6 F | BODY MASS INDEX: 32.9 KG/M2 | WEIGHT: 235 LBS | DIASTOLIC BLOOD PRESSURE: 69 MMHG | HEIGHT: 71 IN | HEART RATE: 83 BPM | SYSTOLIC BLOOD PRESSURE: 123 MMHG | RESPIRATION RATE: 20 BRPM | OXYGEN SATURATION: 98 %

## 2024-10-31 DIAGNOSIS — I48.0 PAROXYSMAL ATRIAL FIBRILLATION (HCC): ICD-10-CM

## 2024-10-31 DIAGNOSIS — Z00.00 MEDICARE ANNUAL WELLNESS VISIT, SUBSEQUENT: Primary | ICD-10-CM

## 2024-10-31 DIAGNOSIS — N18.4 STAGE 4 CHRONIC KIDNEY DISEASE DUE TO TYPE 2 DIABETES MELLITUS (HCC): ICD-10-CM

## 2024-10-31 DIAGNOSIS — E11.22 STAGE 4 CHRONIC KIDNEY DISEASE DUE TO TYPE 2 DIABETES MELLITUS (HCC): ICD-10-CM

## 2024-10-31 PROCEDURE — G0439 PPPS, SUBSEQ VISIT: HCPCS | Performed by: FAMILY MEDICINE

## 2024-10-31 RX ORDER — FINASTERIDE 5 MG/1
5 TABLET, FILM COATED ORAL DAILY
COMMUNITY

## 2024-10-31 NOTE — PROGRESS NOTES
Ambulatory Visit  Name: Dangelo Cooper      : 1950      MRN: [id number]  Encounter Provider: Ada Benites DO  Encounter Date: 10/31/2024   Encounter department: [facility]    Assessment & Plan  Stage 4 chronic kidney disease due to type 2 diabetes mellitus (HCC)    Lab Results   Component Value Date    HGBA1C 7.3 (H) 2024   [facility]    Orders:    Albumin / creatinine urine ratio; Future    Albumin / creatinine urine ratio    Medicare annual wellness visit, subsequent         Paroxysmal atrial fibrillation (HCC)  Management per cardiologist  He follows with [facility]             Preventive health issues were discussed with patient, and age appropriate screening tests were ordered as noted in patient's After Visit Summary. Personalized health advice and appropriate referrals for health education or preventive services given if needed, as noted in patient's After Visit Summary.    History of Present Illness     HPI   Patient Care Team:  Ada Benites DO as PCP - General (Family Medicine)    Review of Systems   Constitutional:  Negative for chills and fever.   HENT:  Negative for congestion, postnasal drip, rhinorrhea and sinus pain.    Eyes:  Negative for photophobia and visual disturbance.   Respiratory:  Negative for cough and shortness of breath.    Cardiovascular:  Negative for chest pain, palpitations and leg swelling.   Gastrointestinal:  Negative for abdominal pain, constipation, diarrhea, nausea and vomiting.   Genitourinary:  Negative for difficulty urinating and dysuria.   Musculoskeletal:  Negative for arthralgias and myalgias.   Skin:  Negative for rash.   Neurological:  Negative for dizziness and syncope.     Medical History Reviewed by provider this encounter:  Tobacco  Allergies  Meds  Problems  Med Hx  Surg Hx  Fam Hx       Annual Wellness Visit Questionnaire   Dangelo is here for his Subsequent  Wellness visit. Last Medicare Wellness visit information reviewed, patient interviewed, no change since last AWV.     Health Risk Assessment:   Patient rates overall health as good. Patient feels that their physical health rating is same. Patient is satisfied with their life. Eyesight was rated as same. Hearing was rated as same. Patient feels that their emotional and mental health rating is same. Patients states they are never, rarely angry. Patient states they are never, rarely unusually tired/fatigued. Pain experienced in the last 7 days has been none. Patient states that he has experienced no weight loss or gain in last 6 months.     Fall Risk Screening:   In the past year, patient has experienced: no history of falling in past year      Home Safety:  Patient does not have trouble with stairs inside or outside of their home. Patient has working smoke alarms and has working carbon monoxide detector. Home safety hazards include: none.     Nutrition:   Current diet is Regular.     Medications:   Patient is not currently taking any over-the-counter supplements. Patient is able to manage medications.     Activities of Daily Living (ADLs)/Instrumental Activities of Daily Living (IADLs):   Walk and transfer into and out of bed and chair?: Yes  Dress and groom yourself?: Yes    Bathe or shower yourself?: Yes    Feed yourself? Yes  Do your laundry/housekeeping?: Yes  Manage your money, pay your bills and track your expenses?: Yes  Make your own meals?: Yes    Do your own shopping?: Yes    Previous Hospitalizations:   Any hospitalizations or ED visits within the last 12 months?: No      Advance Care Planning:   Living will: Yes    Advanced directive: Yes    Advanced directive counseling given: Yes      Cognitive Screening:   Provider or family/friend/caregiver concerned regarding cognition?: No    PREVENTIVE SCREENINGS      Cardiovascular Screening:    General: Screening Current and Risks and Benefits Discussed       "Diabetes Screening:     General: Screening Not Indicated, History Diabetes and Risks and Benefits Discussed      Colorectal Cancer Screening:     General: Screening Current      Prostate Cancer Screening:    General: Screening Not Indicated      Abdominal Aortic Aneurysm (AAA) Screening:    Risk factors include: age between 65-74 yo and tobacco use        Lung Cancer Screening:     General: Screening Not Indicated    Screening, Brief Intervention, and Referral to Treatment (SBIRT)    Screening  Typical number of drinks in a day: 0  Typical number of drinks in a week: 0  Interpretation: Low risk drinking behavior.    Single Item Drug Screening:  How often have you used an illegal drug (including marijuana) or a prescription medication for non-medical reasons in the past year? never    Single Item Drug Screen Score: 0  Interpretation: Negative screen for possible drug use disorder    Social Determinants of Health     Financial Resource Strain: Low Risk  (10/21/2023)    Overall Financial Resource Strain (CARDIA)     Difficulty of Paying Living Expenses: Not hard at all   Food Insecurity: No Food Insecurity (10/31/2024)    Hunger Vital Sign     Worried About Running Out of Food in the Last Year: Never true     Ran Out of Food in the Last Year: Never true   Transportation Needs: No Transportation Needs (10/31/2024)    PRAPARE - Transportation     Lack of Transportation (Medical): No     Lack of Transportation (Non-Medical): No   Housing Stability: Low Risk  (10/31/2024)    Housing Stability Vital Sign     Unable to Pay for Housing in the Last Year: No     Number of Times Moved in the Last Year: 1     Homeless in the Last Year: No   Utilities: Not At Risk (10/31/2024)    [facility] Utilities     Threatened with loss of utilities: No     No results found.    Objective     /69 (BP Location: Left arm, Patient Position: Sitting, Cuff Size: Standard)   Pulse 83   Temp 97.6 °F (36.4 °C) (Tympanic)   Resp 20   Ht 5' 11\" " (1.803 m)   Wt 107 kg (235 lb)   SpO2 98%   BMI 32.78 kg/m²     Physical Exam  Constitutional:       General: He is not in acute distress.     Appearance: Normal appearance. He is not ill-appearing, toxic-appearing or diaphoretic.   HENT:      Head: Normocephalic and atraumatic.      Right Ear: Tympanic membrane and ear canal normal.      Left Ear: Tympanic membrane and ear canal normal.      Nose: Nose normal. No congestion.      Mouth/Throat:      Mouth: Mucous membranes are moist.      Pharynx: Oropharynx is clear. No oropharyngeal exudate.   Eyes:      Extraocular Movements: Extraocular movements intact.      Conjunctiva/sclera: Conjunctivae normal.      Pupils: Pupils are equal, round, and reactive to light.   Cardiovascular:      Rate and Rhythm: Normal rate and regular rhythm.      Pulses: no weak pulses.           Dorsalis pedis pulses are 1+ on the right side and 1+ on the left side.      Heart sounds: Murmur heard.   Pulmonary:      Effort: Pulmonary effort is normal.      Breath sounds: Normal breath sounds. No wheezing, rhonchi or rales.   Abdominal:      General: Bowel sounds are normal. There is no distension.      Palpations: Abdomen is soft.      Tenderness: There is no abdominal tenderness.   Musculoskeletal:         General: No swelling or tenderness. Normal range of motion.      Cervical back: Normal range of motion and neck supple.   Feet:      Right foot:      Skin integrity: No ulcer, skin breakdown, erythema, warmth, callus or dry skin.      Left foot:      Skin integrity: No ulcer, skin breakdown, erythema, warmth, callus or dry skin.   Skin:     General: Skin is warm and dry.      Capillary Refill: Capillary refill takes less than 2 seconds.   Neurological:      General: No focal deficit present.      Mental Status: He is alert and oriented to person, place, and time.      Cranial Nerves: No cranial nerve deficit.   Psychiatric:         Mood and Affect: Mood normal.         Behavior:  Behavior normal.         Thought Content: Thought content normal.       Diabetic Foot Exam    Patient's shoes and socks removed.    Right Foot/Ankle   Right Foot Inspection  Skin Exam: skin normal and skin intact. No dry skin, no warmth, no callus, no erythema, no maceration, no abnormal color, no pre-ulcer, no ulcer and no callus.     Toe Exam: ROM and strength within normal limits.     Sensory   Monofilament testing: intact    Vascular  Capillary refills: < 3 seconds  The right DP pulse is 1+.     Left Foot/Ankle  Left Foot Inspection  Skin Exam: skin normal and skin intact. No dry skin, no warmth, no erythema, no maceration, normal color, no pre-ulcer, no ulcer and no callus.     Toe Exam: ROM and strength within normal limits.     Sensory   Monofilament testing: intact    Vascular  Capillary refills: < 3 seconds  The left DP pulse is 1+.     Assign Risk Category  No deformity present  No loss of protective sensation  No weak pulses  Risk: 0

## 2024-10-31 NOTE — PATIENT INSTRUCTIONS
Medicare Preventive Visit Patient Instructions  Thank you for completing your Welcome to Medicare Visit or Medicare Annual Wellness Visit today. Your next wellness visit will be due in one year (11/1/2025).  The screening/preventive services that you may require over the next 5-10 years are detailed below. Some tests may not apply to you based off risk factors and/or age. Screening tests ordered at today's visit but not completed yet may show as past due. Also, please note that scanned in results may not display below.  Preventive Screenings:  Service Recommendations Previous Testing/Comments   Colorectal Cancer Screening  Colonoscopy    Fecal Occult Blood Test (FOBT)/Fecal Immunochemical Test (FIT)  Fecal DNA/Cologuard Test  Flexible Sigmoidoscopy Age: 45-75 years old   Colonoscopy: every 10 years (May be performed more frequently if at higher risk)  OR  FOBT/FIT: every 1 year  OR  Cologuard: every 3 years  OR  Sigmoidoscopy: every 5 years  Screening may be recommended earlier than age 45 if at higher risk for colorectal cancer. Also, an individualized decision between you and your healthcare provider will decide whether screening between the ages of 76-85 would be appropriate. Colonoscopy: 12/02/2019  FOBT/FIT: Not on file  Cologuard: Not on file  Sigmoidoscopy: Not on file          Prostate Cancer Screening Individualized decision between patient and health care provider in men between ages of 55-69   Medicare will cover every 12 months beginning on the day after your 50th birthday PSA: No results in last 5 years           Hepatitis C Screening Once for adults born between 1945 and 1965  More frequently in patients at high risk for Hepatitis C Hep C Antibody: Not on file        Diabetes Screening 1-2 times per year if you're at risk for diabetes or have pre-diabetes Fasting glucose: 108 mg/dL (3/16/2021)  A1C: 7.3 % (9/19/2024)      Cholesterol Screening Once every 5 years if you don't have a lipid disorder. May  order more often based on risk factors. Lipid panel: 09/18/2024         Other Preventive Screenings Covered by Medicare:  Abdominal Aortic Aneurysm (AAA) Screening: covered once if your at risk. You're considered to be at risk if you have a family history of AAA or a male between the age of 65-75 who smoking at least 100 cigarettes in your lifetime.  Lung Cancer Screening: covers low dose CT scan once per year if you meet all of the following conditions: (1) Age 55-77; (2) No signs or symptoms of lung cancer; (3) Current smoker or have quit smoking within the last 15 years; (4) You have a tobacco smoking history of at least 20 pack years (packs per day x number of years you smoked); (5) You get a written order from a healthcare provider.  Glaucoma Screening: covered annually if you're considered high risk: (1) You have diabetes OR (2) Family history of glaucoma OR (3)  aged 50 and older OR (4)  American aged 65 and older  Osteoporosis Screening: covered every 2 years if you meet one of the following conditions: (1) Have a vertebral abnormality; (2) On glucocorticoid therapy for more than 3 months; (3) Have primary hyperparathyroidism; (4) On osteoporosis medications and need to assess response to drug therapy.  HIV Screening: covered annually if you're between the age of 15-65. Also covered annually if you are younger than 15 and older than 65 with risk factors for HIV infection. For pregnant patients, it is covered up to 3 times per pregnancy.    Immunizations:  Immunization Recommendations   Influenza Vaccine Annual influenza vaccination during flu season is recommended for all persons aged >= 6 months who do not have contraindications   Pneumococcal Vaccine   * Pneumococcal conjugate vaccine = PCV13 (Prevnar 13), PCV15 (Vaxneuvance), PCV20 (Prevnar 20)  * Pneumococcal polysaccharide vaccine = PPSV23 (Pneumovax) Adults 19-65 yo with certain risk factors or if 65+ yo  If never received any  pneumonia vaccine: recommend Prevnar 20 (PCV20)  Give PCV20 if previously received 1 dose of PCV13 or PPSV23   Hepatitis B Vaccine 3 dose series if at intermediate or high risk (ex: diabetes, end stage renal disease, liver disease)   Respiratory syncytial virus (RSV) Vaccine - COVERED BY MEDICARE PART D  * RSVPreF3 (Arexvy) CDC recommends that adults 60 years of age and older may receive a single dose of RSV vaccine using shared clinical decision-making (SCDM)   Tetanus (Td) Vaccine - COST NOT COVERED BY MEDICARE PART B Following completion of primary series, a booster dose should be given every 10 years to maintain immunity against tetanus. Td may also be given as tetanus wound prophylaxis.   Tdap Vaccine - COST NOT COVERED BY MEDICARE PART B Recommended at least once for all adults. For pregnant patients, recommended with each pregnancy.   Shingles Vaccine (Shingrix) - COST NOT COVERED BY MEDICARE PART B  2 shot series recommended in those 19 years and older who have or will have weakened immune systems or those 50 years and older     Health Maintenance Due:      Topic Date Due   • Hepatitis C Screening  Never done   • Colorectal Cancer Screening  12/02/2024     Immunizations Due:  There are no preventive care reminders to display for this patient.  Advance Directives   What are advance directives?  Advance directives are legal documents that state your wishes and plans for medical care. These plans are made ahead of time in case you lose your ability to make decisions for yourself. Advance directives can apply to any medical decision, such as the treatments you want, and if you want to donate organs.   What are the types of advance directives?  There are many types of advance directives, and each state has rules about how to use them. You may choose a combination of any of the following:  Living will:  This is a written record of the treatment you want. You can also choose which treatments you do not want, which  to limit, and which to stop at a certain time. This includes surgery, medicine, IV fluid, and tube feedings.   Durable power of  for healthcare (DPAHC):  This is a written record that states who you want to make healthcare choices for you when you are unable to make them for yourself. This person, called a proxy, is usually a family member or a friend. You may choose more than 1 proxy.  Do not resuscitate (DNR) order:  A DNR order is used in case your heart stops beating or you stop breathing. It is a request not to have certain forms of treatment, such as CPR. A DNR order may be included in other types of advance directives.  Medical directive:  This covers the care that you want if you are in a coma, near death, or unable to make decisions for yourself. You can list the treatments you want for each condition. Treatment may include pain medicine, surgery, blood transfusions, dialysis, IV or tube feedings, and a ventilator (breathing machine).  Values history:  This document has questions about your views, beliefs, and how you feel and think about life. This information can help others choose the care that you would choose.  Why are advance directives important?  An advance directive helps you control your care. Although spoken wishes may be used, it is better to have your wishes written down. Spoken wishes can be misunderstood, or not followed. Treatments may be given even if you do not want them. An advance directive may make it easier for your family to make difficult choices about your care.   Weight Management   Why it is important to manage your weight:  Being overweight increases your risk of health conditions such as heart disease, high blood pressure, type 2 diabetes, and certain types of cancer. It can also increase your risk for osteoarthritis, sleep apnea, and other respiratory problems. Aim for a slow, steady weight loss. Even a small amount of weight loss can lower your risk of health  problems.  How to lose weight safely:  A safe and healthy way to lose weight is to eat fewer calories and get regular exercise. You can lose up about 1 pound a week by decreasing the number of calories you eat by 500 calories each day.   Healthy meal plan for weight management:  A healthy meal plan includes a variety of foods, contains fewer calories, and helps you stay healthy. A healthy meal plan includes the following:  Eat whole-grain foods more often.  A healthy meal plan should contain fiber. Fiber is the part of grains, fruits, and vegetables that is not broken down by your body. Whole-grain foods are healthy and provide extra fiber in your diet. Some examples of whole-grain foods are whole-wheat breads and pastas, oatmeal, brown rice, and bulgur.  Eat a variety of vegetables every day.  Include dark, leafy greens such as spinach, kale, mauri greens, and mustard greens. Eat yellow and orange vegetables such as carrots, sweet potatoes, and winter squash.   Eat a variety of fruits every day.  Choose fresh or canned fruit (canned in its own juice or light syrup) instead of juice. Fruit juice has very little or no fiber.  Eat low-fat dairy foods.  Drink fat-free (skim) milk or 1% milk. Eat fat-free yogurt and low-fat cottage cheese. Try low-fat cheeses such as mozzarella and other reduced-fat cheeses.  Choose meat and other protein foods that are low in fat.  Choose beans or other legumes such as split peas or lentils. Choose fish, skinless poultry (chicken or turkey), or lean cuts of red meat (beef or pork). Before you cook meat or poultry, cut off any visible fat.   Use less fat and oil.  Try baking foods instead of frying them. Add less fat, such as margarine, sour cream, regular salad dressing and mayonnaise to foods. Eat fewer high-fat foods. Some examples of high-fat foods include french fries, doughnuts, ice cream, and cakes.  Eat fewer sweets.  Limit foods and drinks that are high in sugar. This  includes candy, cookies, regular soda, and sweetened drinks.  Exercise:  Exercise at least 30 minutes per day on most days of the week. Some examples of exercise include walking, biking, dancing, and swimming. You can also fit in more physical activity by taking the stairs instead of the elevator or parking farther away from stores. Ask your healthcare provider about the best exercise plan for you.      © Copyright [employer] 2018 Information is for End User's use only and may not be sold, redistributed or otherwise used for commercial purposes. All illustrations and images included in CareNotes® are the copyrighted property of [employer] or [employer]

## 2024-10-31 NOTE — ASSESSMENT & PLAN NOTE
Lab Results   Component Value Date    HGBA1C 7.3 (H) 09/19/2024   [facility]    Orders:    Albumin / creatinine urine ratio; Future    Albumin / creatinine urine ratio

## 2025-03-28 ENCOUNTER — RESULTS FOLLOW-UP (OUTPATIENT)
Dept: FAMILY MEDICINE CLINIC | Facility: CLINIC | Age: 75
End: 2025-03-28

## 2025-03-28 ENCOUNTER — APPOINTMENT (OUTPATIENT)
Dept: RADIOLOGY | Facility: CLINIC | Age: 75
End: 2025-03-28
Payer: COMMERCIAL

## 2025-03-28 ENCOUNTER — OFFICE VISIT (OUTPATIENT)
Dept: FAMILY MEDICINE CLINIC | Facility: CLINIC | Age: 75
End: 2025-03-28
Payer: COMMERCIAL

## 2025-03-28 VITALS
RESPIRATION RATE: 16 BRPM | HEART RATE: 96 BPM | SYSTOLIC BLOOD PRESSURE: 110 MMHG | HEIGHT: 71 IN | BODY MASS INDEX: 32.87 KG/M2 | DIASTOLIC BLOOD PRESSURE: 72 MMHG | TEMPERATURE: 101.6 F | WEIGHT: 234.8 LBS | OXYGEN SATURATION: 97 %

## 2025-03-28 DIAGNOSIS — J01.00 ACUTE NON-RECURRENT MAXILLARY SINUSITIS: ICD-10-CM

## 2025-03-28 DIAGNOSIS — J42 CHRONIC BRONCHITIS, UNSPECIFIED CHRONIC BRONCHITIS TYPE (HCC): ICD-10-CM

## 2025-03-28 DIAGNOSIS — R50.9 FEVER, UNSPECIFIED FEVER CAUSE: ICD-10-CM

## 2025-03-28 DIAGNOSIS — I50.33 ACUTE ON CHRONIC DIASTOLIC (CONGESTIVE) HEART FAILURE (HCC): ICD-10-CM

## 2025-03-28 DIAGNOSIS — M62.81 GENERALIZED MUSCLE WEAKNESS: Primary | ICD-10-CM

## 2025-03-28 DIAGNOSIS — E11.22 STAGE 4 CHRONIC KIDNEY DISEASE DUE TO TYPE 2 DIABETES MELLITUS (HCC): ICD-10-CM

## 2025-03-28 DIAGNOSIS — N18.4 STAGE 4 CHRONIC KIDNEY DISEASE DUE TO TYPE 2 DIABETES MELLITUS (HCC): ICD-10-CM

## 2025-03-28 DIAGNOSIS — I48.0 PAROXYSMAL ATRIAL FIBRILLATION (HCC): ICD-10-CM

## 2025-03-28 LAB
SARS-COV-2 AG UPPER RESP QL IA: NEGATIVE
VALID CONTROL: NORMAL

## 2025-03-28 PROCEDURE — 99214 OFFICE O/P EST MOD 30 MIN: CPT | Performed by: FAMILY MEDICINE

## 2025-03-28 PROCEDURE — G2211 COMPLEX E/M VISIT ADD ON: HCPCS | Performed by: FAMILY MEDICINE

## 2025-03-28 PROCEDURE — 87811 SARS-COV-2 COVID19 W/OPTIC: CPT | Performed by: FAMILY MEDICINE

## 2025-03-28 PROCEDURE — 71046 X-RAY EXAM CHEST 2 VIEWS: CPT

## 2025-03-28 RX ORDER — OXYBUTYNIN CHLORIDE 5 MG/1
5 TABLET ORAL
COMMUNITY
Start: 2025-02-26

## 2025-03-28 RX ORDER — AMIODARONE HYDROCHLORIDE 200 MG/1
1 TABLET ORAL 2 TIMES DAILY
COMMUNITY
Start: 2024-12-19

## 2025-03-28 RX ORDER — AMOXICILLIN 500 MG/1
CAPSULE ORAL
COMMUNITY
Start: 2025-02-05

## 2025-03-28 NOTE — ASSESSMENT & PLAN NOTE
Wt Readings from Last 3 Encounters:   03/28/25 107 kg (234 lb 12.8 oz)   10/31/24 107 kg (235 lb)   10/10/24 98.4 kg (217 lb)     Per cardiology

## 2025-03-28 NOTE — PROGRESS NOTES
Dangelo Cooper 1950 male MRN: [id number]    Family Medicine Acute Visit    ASSESSMENT/PLAN  Problem List Items Addressed This Visit          Cardiovascular and Mediastinum    Paroxysmal atrial fibrillation (HCC)    Management per [facility]ology at [facility]          Acute on chronic diastolic (congestive) heart failure (HCC)    Wt Readings from Last 3 Encounters:   03/28/25 107 kg (234 lb 12.8 oz)   10/31/24 107 kg (235 lb)   10/10/24 98.4 kg (217 lb)     Per cardiology                Relevant Medications    amiodarone 200 mg tablet       Respiratory    Chronic obstructive pulmonary disease (COPD) (HCC)    On room air  Uses inhaler prn            Endocrine    Stage 4 chronic kidney disease due to type 2 diabetes mellitus (HCC)      Lab Results   Component Value Date    HGBA1C 7.3 (H) 09/19/2024     [facility]          Other Visit Diagnoses         Generalized muscle weakness    -  Primary    Relevant Orders    POCT Rapid Covid Ag (Completed)      Fever, unspecified fever cause        Relevant Orders    XR chest pa and lateral      Acute non-recurrent maxillary sinusitis        Relevant Medications    amoxicillin-clavulanate (AUGMENTIN) 875-125 mg per tablet            Advised patient I believe he should go to the ER as he looks very weak and dehydrated with fever and chest congestion. He declines at this time. Offered CXR and starting him on antibiotics with close return precautions and if not improving ER over the weekend        Future Appointments   Date Time Provider Department Center   11/6/2025  9:45 AM DO [facility]          SUBJECTIVE  CC: Cold Like Symptoms and Fatigue (Weak, disoriented, no appetite , not eating ,yes liquids)      HPI:  Dangelo Cooper is a 74 y.o. male who presents for sick visit  Started Tuesday with congestion, body aches, decreased po intake  Tried mucinex    Review of Systems   Constitutional:  Positive for fever. Negative for chills.    HENT:  Positive for congestion. Negative for postnasal drip, rhinorrhea and sinus pain.    Eyes:  Negative for photophobia and visual disturbance.   Respiratory:  Positive for cough. Negative for shortness of breath.    Cardiovascular:  Negative for chest pain, palpitations and leg swelling.   Gastrointestinal:  Negative for abdominal pain, constipation, diarrhea, nausea and vomiting.   Genitourinary:  Negative for difficulty urinating and dysuria.   Musculoskeletal:  Negative for arthralgias and myalgias.   Skin:  Negative for rash.   Neurological:  Negative for dizziness and syncope.       Historical Information   The patient history was reviewed as follows:  Past Medical History:   Diagnosis Date    A-fib (HCC)     Arthritis     CHF (congestive heart failure) (HCC)     Chronic kidney disease don't remember    COPD (chronic obstructive pulmonary disease) (HCC)     Diabetes mellitus (HCC)     Enlarged prostate     Gout     Heart murmur birth    Hyperlipidemia     Hypertension          Past Surgical History:   Procedure Laterality Date    A-V CARDIAC PACEMAKER INSERTION  08/2022    EAR SURGERY      EYE SURGERY  2021    cat. repaired    FRACTURE SURGERY       Family History   Problem Relation Age of Onset    Dementia Mother     Diabetes Mother     Cancer Father       Social History   Social History     Substance and Sexual Activity   Alcohol Use Yes    Alcohol/week: 2.0 standard drinks of alcohol    Types: 1 Cans of beer, 1 Standard drinks or equivalent per week    Comment: occassional     Social History     Substance and Sexual Activity   Drug Use No     Social History     Tobacco Use   Smoking Status Former    Types: Pipe   Smokeless Tobacco Never       Medications:     Current Outpatient Medications:     albuterol (PROVENTIL HFA,VENTOLIN HFA) 90 mcg/act inhaler, INHALE 2 PUFFS BY MOUTH FOUR TIMES A DAY AS NEEDED FOR BREATHING, Disp: , Rfl:     allopurinol (ZYLOPRIM) 100 mg tablet, Take 100 mg by mouth daily  ,  Disp: , Rfl:     amoxicillin (AMOXIL) 500 mg capsule, TAKE 4 CAPSULES BY MOUTH ONE HOUR PRIOR TO TREATMENT, Disp: , Rfl:     amoxicillin-clavulanate (AUGMENTIN) 875-125 mg per tablet, Take 1 tablet by mouth every 12 (twelve) hours for 7 days, Disp: 14 tablet, Rfl: 0    aspirin 81 mg chewable tablet, Chew 81 mg daily by mouth, Disp: , Rfl:     atorvastatin (LIPITOR) 40 mg tablet, Take 1 tablet (40 mg total) by mouth every evening, Disp: 60 tablet, Rfl: 2    ferrous sulfate 325 (65 Fe) mg tablet, Take 325 mg by mouth 2 (two) times a day with meals, Disp: , Rfl:     finasteride (PROSCAR) 5 mg tablet, Take 5 mg by mouth daily, Disp: , Rfl:     fluticasone-salmeterol (Advair) 250-50 mcg/dose inhaler, Inhale 1 puff 2 (two) times a day Rinse mouth after use., Disp: , Rfl:     folic acid (FOLVITE) 1 mg tablet, Take 1 mg by mouth daily , Disp: , Rfl:     guaiFENesin (MUCINEX) 600 mg 12 hr tablet, Take 1 tablet (600 mg total) by mouth every 12 (twelve) hours, Disp: 10 tablet, Rfl: 0    Lutein 20 MG CAPS, Take 20 mg by mouth daily., Disp: , Rfl:     metoprolol succinate (TOPROL-XL) 50 mg 24 hr tablet, Take 50 mg by mouth daily (Patient taking differently: Take 100 mg by mouth daily), Disp: , Rfl:     oxybutynin (DITROPAN) 5 mg tablet, Take 5 mg by mouth, Disp: , Rfl:     pantoprazole (PROTONIX) 40 mg tablet, Take 40 mg by mouth daily, Disp: , Rfl:     rivaroxaban (XARELTO) 15 mg tablet, Take 1 tablet (15 mg total) by mouth daily with breakfast, Disp: 30 tablet, Rfl: 2    sodium chloride (OCEAN) 0.65 % nasal spray, 1 spray into each nostril 2 (two) times a day as needed for congestion, Disp: 88 mL, Rfl: 0    spironolactone (ALDACTONE) 25 mg tablet, Take 1 tablet (25 mg total) by mouth daily, Disp: 60 tablet, Rfl: 2    tamsulosin (FLOMAX) 0.4 mg, Take 0.8 mg by mouth daily with dinner, Disp: , Rfl:     torsemide (DEMADEX) 20 mg tablet, Take 2 tablets (40 mg total) by mouth daily, Disp: 90 tablet, Rfl: 0    amiodarone 200 mg  "tablet, Take 1 tablet by mouth 2 (two) times a day (Patient not taking: Reported on 3/28/2025), Disp: , Rfl:     Allergies   Allergen Reactions    Bee Venom Anaphylaxis    Enalapril Other (See Comments)     unknown    Morphine GI Intolerance       OBJECTIVE  Vitals:   Vitals:    03/28/25 1348   BP: 110/72   BP Location: Left arm   Patient Position: Sitting   Cuff Size: Large   Pulse: 96   Resp: 16   Temp: (!) 101.6 °F (38.7 °C)   TempSrc: Tympanic   SpO2: 97%   Weight: 107 kg (234 lb 12.8 oz)   Height: 5' 11\" (1.803 m)         Physical Exam  Constitutional:       Appearance: He is well-developed.   HENT:      Head: Normocephalic and atraumatic.      Right Ear: External ear normal.      Left Ear: External ear normal.      Nose: Congestion and rhinorrhea present.   Eyes:      Extraocular Movements: Extraocular movements intact.      Conjunctiva/sclera: Conjunctivae normal.   Cardiovascular:      Rate and Rhythm: Normal rate and regular rhythm.      Heart sounds: Normal heart sounds. No murmur heard.  Pulmonary:      Effort: Pulmonary effort is normal. No respiratory distress.      Breath sounds: Rhonchi present. No wheezing.   Musculoskeletal:         General: Normal range of motion.      Cervical back: Normal range of motion and neck supple.   Skin:     General: Skin is warm and dry.   Neurological:      Mental Status: He is alert and oriented to person, place, and time.   Psychiatric:         Behavior: Behavior normal.                    Ada Benites, DO    3/28/2025      "

## 2025-04-15 ENCOUNTER — APPOINTMENT (OUTPATIENT)
Dept: RADIOLOGY | Facility: CLINIC | Age: 75
End: 2025-04-15
Attending: STUDENT IN AN ORGANIZED HEALTH CARE EDUCATION/TRAINING PROGRAM
Payer: COMMERCIAL

## 2025-04-15 ENCOUNTER — OFFICE VISIT (OUTPATIENT)
Dept: FAMILY MEDICINE CLINIC | Facility: CLINIC | Age: 75
End: 2025-04-15
Payer: COMMERCIAL

## 2025-04-15 VITALS
SYSTOLIC BLOOD PRESSURE: 112 MMHG | OXYGEN SATURATION: 99 % | RESPIRATION RATE: 16 BRPM | TEMPERATURE: 97.9 F | BODY MASS INDEX: 33.12 KG/M2 | WEIGHT: 236.6 LBS | HEIGHT: 71 IN | DIASTOLIC BLOOD PRESSURE: 78 MMHG | HEART RATE: 82 BPM

## 2025-04-15 DIAGNOSIS — R07.89 LEFT-SIDED CHEST WALL PAIN: Primary | ICD-10-CM

## 2025-04-15 DIAGNOSIS — R07.89 LEFT-SIDED CHEST WALL PAIN: ICD-10-CM

## 2025-04-15 PROCEDURE — 99213 OFFICE O/P EST LOW 20 MIN: CPT | Performed by: STUDENT IN AN ORGANIZED HEALTH CARE EDUCATION/TRAINING PROGRAM

## 2025-04-15 PROCEDURE — 71046 X-RAY EXAM CHEST 2 VIEWS: CPT

## 2025-04-15 PROCEDURE — G2211 COMPLEX E/M VISIT ADD ON: HCPCS | Performed by: STUDENT IN AN ORGANIZED HEALTH CARE EDUCATION/TRAINING PROGRAM

## 2025-04-15 RX ORDER — SPIRONOLACTONE 25 MG/1
TABLET ORAL
COMMUNITY

## 2025-04-15 NOTE — PATIENT INSTRUCTIONS
First line: topical voltaren gel/diclofenac gel and lidocaine patch/lidoderm patch over the counter  Second line: Tylenol 500 mg every 6 hours and ibuprofen 200 to 400 mg twice daily over the counter

## 2025-04-15 NOTE — PROGRESS NOTES
Name: Dangelo Cooper      : 1950      MRN: [id number]  Encounter Provider: Cindy Matos MD  Encounter Date: 4/15/2025   Encounter department: [facility]  :  Assessment & Plan  Left-sided chest wall pain  2 falls onto left lateral chest wall (mechanical fall x2) w/ last injury 6 days prior; no hematoma/bruising/skin bulge or deformity noted  but c/f rib fracture will obtain stat imaging     Discussed if normal, likely intercostal muscle strain/bruise    Pain tx for intermittent discomfort:   First line: topical voltaren gel/diclofenac gel and lidocaine patch/lidoderm patch over the counter  Second line: Tylenol 500 mg every 6 hours and ibuprofen 200 to 400 mg twice daily over the counter    Return precautions discussed   Orders:    XR chest pa and lateral; Future           History of Present Illness   75 yo M w/ AAA, HFrEF, Afib, COPD, NANCY, CKD, type II DM presenting for acute same day apt after fall with severe shooting side pain. Notes two weeks ago he fell on his left side when playing with his grandson (fell on some toys). Noted some pain but then one week ago he fell again when stepped out of his car and slipped on some gravel. Denies any head injury or LOC or preceding cardiopulmonary sx. Now notes pain intermittent of left lateral chest wall- worse with deep inspiration       Review of Systems   Constitutional:  Negative for chills and fever.   HENT:  Negative for trouble swallowing.    Eyes:  Negative for visual disturbance.   Respiratory:  Negative for cough and shortness of breath.    Cardiovascular:  Negative for chest pain, palpitations and leg swelling.   Gastrointestinal:  Negative for abdominal pain and blood in stool.   Genitourinary:  Negative for dysuria and hematuria.   Musculoskeletal:  Positive for arthralgias and back pain.   Skin:  Negative for color change and rash.   Neurological:  Negative for syncope and light-headedness.   All other systems  "reviewed and are negative.      Objective   /78 (BP Location: Left arm, Patient Position: Sitting, Cuff Size: Large)   Pulse 82   Temp 97.9 °F (36.6 °C) (Tympanic)   Resp 16   Ht 5' 11\" (1.803 m)   Wt 107 kg (236 lb 9.6 oz)   SpO2 99%   BMI 33.00 kg/m²      Physical Exam  Vitals and nursing note reviewed.   Constitutional:       General: He is not in acute distress.     Appearance: Normal appearance. He is well-developed.   HENT:      Head: Normocephalic and atraumatic.   Eyes:      Conjunctiva/sclera: Conjunctivae normal.   Cardiovascular:      Rate and Rhythm: Normal rate and regular rhythm.      Pulses: Normal pulses.      Heart sounds: Normal heart sounds. No murmur heard.  Pulmonary:      Effort: Pulmonary effort is normal. No respiratory distress.      Breath sounds: Normal breath sounds. No wheezing or rales.   Abdominal:      General: Bowel sounds are normal.      Palpations: Abdomen is soft.      Tenderness: There is no abdominal tenderness.   Musculoskeletal:         General: No swelling.      Cervical back: Normal range of motion and neck supple.        Back:       Right lower leg: No edema.      Left lower leg: No edema.      Comments: Tenderness to palpation of left lateral posterior chest wall- no erythema, no effusion, no bulging or skin deformity noted, no hematoma    Skin:     General: Skin is warm and dry.      Capillary Refill: Capillary refill takes less than 2 seconds.   Neurological:      Mental Status: He is alert.   Psychiatric:         Mood and Affect: Mood normal.       Administrative Statements   I have spent a total time of 20 minutes in caring for this patient on the day of the visit/encounter including Prognosis, Risks and benefits of tx options, Instructions for management, Patient and family education, Importance of tx compliance, Risk factor reductions, Impressions, Counseling / Coordination of care, Documenting in the medical record, and Reviewing/placing orders in the " medical record (including tests, medications, and/or procedures).

## 2025-04-16 ENCOUNTER — RESULTS FOLLOW-UP (OUTPATIENT)
Dept: FAMILY MEDICINE CLINIC | Facility: CLINIC | Age: 75
End: 2025-04-16
